# Patient Record
Sex: FEMALE | Race: BLACK OR AFRICAN AMERICAN | NOT HISPANIC OR LATINO | Employment: OTHER | URBAN - METROPOLITAN AREA
[De-identification: names, ages, dates, MRNs, and addresses within clinical notes are randomized per-mention and may not be internally consistent; named-entity substitution may affect disease eponyms.]

---

## 2017-02-23 ENCOUNTER — TRANSCRIBE ORDERS (OUTPATIENT)
Dept: ADMINISTRATIVE | Facility: HOSPITAL | Age: 42
End: 2017-02-23

## 2017-02-23 DIAGNOSIS — G24.9 DYSTONIA: Primary | ICD-10-CM

## 2017-03-28 ENCOUNTER — HOSPITAL ENCOUNTER (OUTPATIENT)
Dept: INFUSION THERAPY | Facility: HOSPITAL | Age: 42
Discharge: HOME | End: 2017-03-28
Attending: PHYSICAL MEDICINE & REHABILITATION | Admitting: PHYSICAL MEDICINE & REHABILITATION

## 2017-03-28 VITALS
HEART RATE: 114 BPM | BODY MASS INDEX: 32.62 KG/M2 | SYSTOLIC BLOOD PRESSURE: 128 MMHG | DIASTOLIC BLOOD PRESSURE: 84 MMHG | HEIGHT: 66 IN | RESPIRATION RATE: 20 BRPM | OXYGEN SATURATION: 98 % | TEMPERATURE: 98.8 F | WEIGHT: 203 LBS

## 2017-03-28 DIAGNOSIS — G24.9 DYSTONIA: ICD-10-CM

## 2017-03-28 DIAGNOSIS — R25.2 MUSCLE CRAMP: ICD-10-CM

## 2017-03-28 DIAGNOSIS — Z86.61: ICD-10-CM

## 2017-03-28 DIAGNOSIS — R25.2 SPASTICITY: Primary | ICD-10-CM

## 2017-03-28 PROCEDURE — 95874 GUIDE NERV DESTR NEEDLE EMG: CPT

## 2017-03-28 PROCEDURE — 25010000002 ONABOTULINUMTOXINA 100 UNITS RECONSTITUTED SOLUTION: Performed by: PHYSICAL MEDICINE & REHABILITATION

## 2017-03-28 RX ADMIN — ONABOTULINUMTOXINA 300 UNITS: 100 INJECTION, POWDER, LYOPHILIZED, FOR SOLUTION INTRADERMAL; INTRAMUSCULAR at 10:31

## 2017-03-28 RX ADMIN — ONABOTULINUMTOXINA 100 UNITS: 100 INJECTION, POWDER, LYOPHILIZED, FOR SOLUTION INTRADERMAL; INTRAMUSCULAR at 10:29

## 2017-03-28 NOTE — OP NOTE
DATE OF PROCEDURE:  03/28/2017    SURGEON: Nghia Alvarez MD     DIAGNOSES:  1.   Left upper extremity and left lower extremity spasticity/dystonia.   2.   History of meningitis at 18 months of age.   3.   History of left arm tendon transfer for finger strength, [facility] in 1988.   4.   History of plate, left forearm, 1995,     PROCEDURE PERFORMED: Botulinum toxin injection with electromyographic guidance, left upper extremity and left lower extremity.     HISTORY: The patient is a 41-year-old female with history of meningitis at 18 months of age with subtle weakness and spasticity. She has had increased left-sided tightness in the arm and leg with increased dystonia for the last 4-5 years. She is showing a good response to previous Botox injections for dystonia with flexion and extension of the elbow and fingers as well as left foot inversion and toe curling. Her last injection was on 12/22/2016. This was 5-apart from her previous 1 and she did not get as much benefit possibly due to the Botox wearing off further.  We discussed increasing her dose to 400 units and adding on the foot intrinsic muscles for her toe curling and adduction.     PHYSICAL EXAMINATION: She has significant spasticity spasticity/dystonia in the left upper extremity with elbow flexors greater than elbow extensors as well as finger flexors. When she ambulates left elbow is flexed.  She has a left double metal upright AFO with a lateral T-strap. She has inversion tone with toes curling and abduction of the first toe.     Risks, benefits, and alternatives were reviewed. Risks include bleeding, infection, excessive weakness, incomplete reduction of her dystonia/spasticity, systemic spread affecting breathing or swallowing muscles, local muscle soreness. Benefits are to decrease her spasticity/dystonia and improve her comfort in position and functional use of the left arm and left leg. Consent was signed and on the chart.      DESCRIPTION OF PROCEDURE: The skin was prepped in sterile fashion. Botulinum toxin type A-Botox at a concentration of 50 units/mL was injected with electromyographic guidance. Left biceps 37.5 units, brachialis 37.5 units, triceps 25 units, brachioradialis 12.5 units, flexor digitorum superficialis 12.5 units, posterior tibialis 125 units, flexure digitorum longus 62.5 unit, flexure hallucis longus 50 units, flexure digitorum brevis 25 units, and adductor hallucis 12.5 units (we elected not to inject the EHL-extensor hallucis longus as it is not problematic when she is walking, but is when she is at rest).   (We also discussed possibly readjusting doses more toward the foot intrinsic muscles on subsequent injections depending on her response today).  Lot #, [id number], expiration September 2019. The patient tolerated the procedure well. No bleeding. Aspiration negative at all sites.     ASSESSMENT AND PLAN: History of left upper extremity and left lower extremity spasticity/dystonia-status post Botox injection today.  Continue with her left double metal upright AFO. She will follow up in the office in approximately 3 weeks for reassessment.           Nghia Alvarez M.D.*  Gertrude  D:   03/28/2017 11:49:06  T:   03/28/2017 13:47:40  Job ID:   [id number]  Document ID:   [id number]  cc:

## 2017-03-28 NOTE — PROGRESS NOTES
The patient has been informed that these injections can have side effects which include rash, flu-like symptoms, dry-mouth, difficulty swallowing, muscle weakness or allergic reaction.  The patient has been instructed that if any of the above symptoms should occur, the patient should go to the nearest emergency room for attention.  Watch for head droop, breathing changes and swallow problems.  Follow up in one month to assess results of today's injection and plan to re-inject in 3 months depending on response.    Call Dr. Nghia Alvarez if you have any questions or concerns.  You can reach   Dr. Alvarez at [phone] [address] [facility].    Pt tolerated well.  Pt given discharge instructions concerning side effects and next appt. Pt discharged ambulatory at 1115.

## 2017-03-28 NOTE — PATIENT INSTRUCTIONS
The patient has been informed that these injections can have side effects which include rash, flu-like symptoms, dry-mouth, difficulty swallowing, muscle weakness or allergic reaction.  The patient has been instructed that if any of the above symptoms should occur, the patient should go to the nearest emergency room for attention.  Watch for head droop, breathing changes and swallow problems.  Follow up in one month to assess results of today's injection and plan to re-inject in 3 months depending on response.    Call Dr. Nghia Alvarez if you have any questions or concerns.  You can reach   Dr. Alvarez at [phone] [address] [facility].

## 2017-05-02 ENCOUNTER — TRANSCRIBE ORDERS (OUTPATIENT)
Dept: ADMINISTRATIVE | Facility: HOSPITAL | Age: 42
End: 2017-05-02

## 2017-05-02 DIAGNOSIS — G24.9 DYSTONIA: Primary | ICD-10-CM

## 2017-07-11 ENCOUNTER — HOSPITAL ENCOUNTER (OUTPATIENT)
Dept: INFUSION THERAPY | Facility: HOSPITAL | Age: 42
Discharge: HOME | End: 2017-07-11
Attending: PHYSICAL MEDICINE & REHABILITATION | Admitting: PHYSICAL MEDICINE & REHABILITATION

## 2017-07-11 VITALS
SYSTOLIC BLOOD PRESSURE: 138 MMHG | HEART RATE: 104 BPM | RESPIRATION RATE: 16 BRPM | OXYGEN SATURATION: 94 % | TEMPERATURE: 98.2 F | DIASTOLIC BLOOD PRESSURE: 82 MMHG

## 2017-07-11 DIAGNOSIS — G24.9 DYSTONIA: ICD-10-CM

## 2017-07-11 PROCEDURE — 95874 GUIDE NERV DESTR NEEDLE EMG: CPT

## 2017-07-11 PROCEDURE — 25010000002 ONABOTULINUMTOXINA 100 UNITS RECONSTITUTED SOLUTION: Performed by: PHYSICAL MEDICINE & REHABILITATION

## 2017-07-11 RX ADMIN — ONABOTULINUMTOXINA 400 UNITS: 100 INJECTION, POWDER, LYOPHILIZED, FOR SOLUTION INTRADERMAL; INTRAMUSCULAR at 09:15

## 2017-07-11 NOTE — OP NOTE
DATE OF PROCEDURE: 07/11/2017     SURGEON: Nghia Alvarez MD      DIAGNOSES:  1. Left upper extremity and left lower extremity spasticity/dystonia.   2. History of meningitis at 18 months of age.   3. History of left arm tendon transfer for finger strength, [facility] in 1988.   4. History of plate, left forearm, 1995,      PROCEDURE PERFORMED: Botulinum toxin injection with electromyographic guidance, left upper extremity and left lower extremity.      HISTORY: The patient is a 41-year-old female with history of meningitis at 18 months of age with subtle weakness and spasticity. She has had increased left-sided tightness in the arm and leg with increased dystonia for the last 4-5 years. She is showing a good response to previous Botox injections for dystonia with flexion and extension of the elbow and fingers as well as left foot inversion and toe curling. Her last injections were on 12/22/2016 and 03/28/2017. After her last injection she felt heaviness in the LLE and we discussed decreasing dose in the Left leg muscles with same doses with left foot intrinsics and LUE. .      PHYSICAL EXAMINATION: She has significant spasticity spasticity/dystonia in the left upper extremity with elbow flexors greater than elbow extensors as well as finger flexors. When she ambulates left elbow is flexed. She has a left double metal upright AFO with a lateral T-strap. She has inversion tone with toes curling and abduction of the first toe.      Risks, benefits, and alternatives were reviewed. Risks include bleeding, infection, excessive weakness, incomplete reduction of her dystonia/spasticity, systemic spread affecting breathing or swallowing muscles, local muscle soreness. Benefits are to decrease her spasticity/dystonia and improve her comfort in position and functional use of the left arm and left leg. Consent was signed and on the chart.      DESCRIPTION OF PROCEDURE: The skin was prepped in sterile fashion.  Botulinum toxin type A-Botox at a concentration of 50 units/mL was injected with electromyographic guidance. Left biceps 37.5 units, brachialis 37.5 units, triceps 25 units, brachioradialis 12.5 units, flexor digitorum superficialis 12.5 units, posterior tibialis 100 units, flexure digitorum longus 50 unit, flexure hallucis longus 40 units, flexure digitorum brevis 25 units, and adductor hallucis 12.5 units. Total 352.5 units injected, wastage of 47.5 units, out of 400 units drawn up for procedure. The patient tolerated the procedure well. No bleeding. Aspiration negative at all sites.      ASSESSMENT AND PLAN: History of left upper extremity and left lower extremity spasticity/dystonia-status post Botox injection today. Continue with her left double metal upright AFO. She will follow up in the office in approximately 4 weeks for reassessment.               Nghia Alvarez M.D

## 2017-07-11 NOTE — PATIENT INSTRUCTIONS
The patient has been informed that these injections can have side effects which include rash, flu-like symptoms, dry-mouth, difficulty swallowing, muscle weakness or allergic reaction.  The patient has been instructed that if any of the above symptoms should occur, the patient should go to the nearest emergency room for attention.  Watch for head droop, breathing changes and swallow problems.  Follow up in one month to assess results of today's injection and plan to re-inject in 3 months depending on response.    Call Dr. Nghia Alvarez if you have any questions or concerns.  You can reach   Dr. Alvarez at [phone] [address] [facility].

## 2017-08-14 ENCOUNTER — TRANSCRIBE ORDERS (OUTPATIENT)
Dept: ADMINISTRATIVE | Facility: HOSPITAL | Age: 42
End: 2017-08-14

## 2017-08-14 DIAGNOSIS — G24.9 DYSTONIA: Primary | ICD-10-CM

## 2017-10-26 ENCOUNTER — HOSPITAL ENCOUNTER (OUTPATIENT)
Dept: INFUSION THERAPY | Facility: HOSPITAL | Age: 42
Discharge: HOME | End: 2017-10-26
Attending: PHYSICAL MEDICINE & REHABILITATION | Admitting: PHYSICAL MEDICINE & REHABILITATION

## 2017-10-26 VITALS
SYSTOLIC BLOOD PRESSURE: 135 MMHG | DIASTOLIC BLOOD PRESSURE: 89 MMHG | TEMPERATURE: 97.5 F | RESPIRATION RATE: 20 BRPM | OXYGEN SATURATION: 98 % | HEART RATE: 112 BPM

## 2017-10-26 DIAGNOSIS — G24.9 DYSTONIA: ICD-10-CM

## 2017-10-26 PROCEDURE — 25010000002 ONABOTULINUMTOXINA 100 UNITS RECONSTITUTED SOLUTION: Performed by: PHYSICAL MEDICINE & REHABILITATION

## 2017-10-26 PROCEDURE — 95874 GUIDE NERV DESTR NEEDLE EMG: CPT

## 2017-10-26 RX ADMIN — ONABOTULINUMTOXINA 400 UNITS: 100 INJECTION, POWDER, LYOPHILIZED, FOR SOLUTION INTRADERMAL; INTRAMUSCULAR at 10:05

## 2017-10-26 NOTE — OP NOTE
DATE OF PROCEDURE: 10/26/2017      SURGEON: Nghia Alvarez MD       DIAGNOSES:  1. Left upper extremity and left lower extremity spasticity/dystonia.   2. History of meningitis at 18 months of age.   3. History of left arm tendon transfer for finger strength, [facility] in 1988.   4. History of plate, left forearm, 1995,       PROCEDURE PERFORMED: Botulinum toxin injection with electromyographic guidance, left upper extremity and left lower extremity.       HISTORY: The patient is a 42-year-old female with history of meningitis at 18 months of age with subtle weakness and spasticity. She has had increased left-sided tightness in the arm and leg with increased dystonia for the last 4-5 years. She is showing a good response to previous Botox injections for dystonia with flexion and extension of the elbow and fingers as well as left foot inversion and toe curling. Her last injections were on 12/22/2016 and 03/28/2017 and 07/11/2017. After her last injection she had a good balance between spasticity reduction and avoiding excessive weakness. Botox has worn off. She wishes to repeat the injection in a similar fashion.      PHYSICAL EXAMINATION: She has significant spasticity spasticity/dystonia in the left upper extremity with elbow flexors greater than elbow extensors as well as finger flexors.   She has a left double metal upright AFO with a lateral T-strap. She has inversion tone with toes curling and abduction of the first toe.       Risks, benefits, and alternatives were reviewed. Risks include bleeding, infection, excessive weakness, incomplete reduction of her dystonia/spasticity, systemic spread affecting breathing or swallowing muscles, local muscle soreness. Benefits are to decrease her spasticity/dystonia and improve her comfort in position and functional use of the left arm and left leg. Consent was signed and on the chart.       DESCRIPTION OF PROCEDURE: The skin was prepped in sterile  fashion. Botulinum toxin type A-Botox at a concentration of 50 units/mL was injected with electromyographic guidance. Left biceps 37.5 units, brachialis 37.5 units, triceps 25 units, brachioradialis 12.5 units (more electromyographically active then biceps/brachialis), flexor digitorum superficialis 12.5 units, posterior tibialis 100 units, flexure digitorum longus 50 unit, flexure hallucis longus 40 units, flexure digitorum brevis 25 units, and adductor hallucis 12.5 units. Total 352.5 units injected, wastage of 47.5 units, out of 400 units drawn up for procedure. The patient tolerated the procedure well. No bleeding. Aspiration negative at all sites. Lot [id number] 04 2020 x 1 and [id number] 05 2020 x 3.       ASSESSMENT AND PLAN: History of left upper extremity and left lower extremity spasticity/dystonia-status post Botox injection today. Continue with her left double metal upright AFO. Continue Baclofen 20 mg tid.  She will follow up in the office in approximately Nov 15 for reassessment. May possibly re-distribute from the biceps/brachialis to the brachioradialis on subsequent injection depending on the response.                   Nghia Alvarez M.D

## 2017-10-26 NOTE — PATIENT INSTRUCTIONS
OnabotulinumtoxinA injection (Medical Use)  What is this medicine?  ONABOTULINUMTOXINA (o na PHILIP you lyletitia num tox in ) is a neuro-muscular blocker. This medicine is used to treat crossed eyes, eyelid spasms, severe neck muscle spasms, ankle and toe muscle spasms, and elbow, wrist, and finger muscle spasms. It is also used to treat excessive underarm sweating, to prevent chronic migraine headaches, and to treat loss of bladder control due to neurologic conditions such as multiple sclerosis or spinal cord injury.  This medicine may be used for other purposes; ask your health care provider or pharmacist if you have questions.  COMMON BRAND NAME(S): Botox  What should I tell my health care provider before I take this medicine?  They need to know if you have any of these conditions:  -breathing problems  -cerebral palsy spasms  -difficulty urinating  -heart problems  -history of surgery where this medicine is going to be used  -infection at the site where this medicine is going to be used  -myasthenia gravis or other neurologic disease  -nerve or muscle disease  -surgery plans  -take medicines that treat or prevent blood clots  -thyroid problems  -an unusual or allergic reaction to botulinum toxin, albumin, other medicines, foods, dyes, or preservatives  -pregnant or trying to get pregnant  -breast-feeding  How should I use this medicine?  This medicine is for injection into a muscle. It is given by a health care professional in a hospital or clinic setting.  Talk to your pediatrician regarding the use of this medicine in children. While this drug may be prescribed for children as young as 12 years old for selected conditions, precautions do apply.  Overdosage: If you think you have taken too much of this medicine contact a poison control center or emergency room at once.  NOTE: This medicine is only for you. Do not share this medicine with others.  What if I miss a dose?  This does not apply.  What may interact with  this medicine?  -aminoglycoside antibiotics like gentamicin, neomycin, tobramycin  -muscle relaxants  -other botulinum toxin injections  This list may not describe all possible interactions. Give your health care provider a list of all the medicines, herbs, non-prescription drugs, or dietary supplements you use. Also tell them if you smoke, drink alcohol, or use illegal drugs. Some items may interact with your medicine.  What should I watch for while using this medicine?  Visit your doctor for regular check ups.  This medicine will cause weakness in the muscle where it is injected. Tell your doctor if you feel unusually weak in other muscles. Get medical help right away if you have problems with breathing, swallowing, or talking.  This medicine might make your eyelids droop or make you see blurry or double. If you have weak muscles or trouble seeing do not drive a car, use machinery, or do other dangerous activities.  This medicine contains albumin from human blood. It may be possible to pass an infection in this medicine, but no cases have been reported. Talk to your doctor about the risks and benefits of this medicine.  If your activities have been limited by your condition, go back to your regular routine slowly after treatment with this medicine.  What side effects may I notice from receiving this medicine?  Side effects that you should report to your doctor or health care professional as soon as possible:  -allergic reactions like skin rash, itching or hives, swelling of the face, lips, or tongue  -breathing problems  -changes in vision  -chest pain or tightness  -eye irritation, pain  -fast, irregular heartbeat  -infection  -numbness  -speech problems  -swallowing problems  -unusual weakness  Side effects that usually do not require medical attention (report to your doctor or health care professional if they continue or are bothersome):  -bruising or pain at site where injected  -drooping eyelid  -dry eyes or  mouth  -headache  -muscles aches, pains  -sensitivity to light  -tearing  This list may not describe all possible side effects. Call your doctor for medical advice about side effects. You may report side effects to FDA at 1-042-MOL-2310.  Where should I keep my medicine?  This drug is given in a hospital or clinic and will not be stored at home.  NOTE: This sheet is a summary. It may not cover all possible information. If you have questions about this medicine, talk to your doctor, pharmacist, or health care provider.    FOLLOW-UP  The patient has been informed that these injections can have side effects which include rash, flu-like symptoms, dry-mouth, difficulty swallowing, muscle weakness or allergic reaction.  The patient has been instructed that if any of the above symptoms should occur, the patient should go to the nearest emergency room for attention.  Watch for head droop, breathing changes and swallow problems.  Follow up in one month to assess results of today's injection and plan to re-inject in 3 months depending on response.    Call Dr. Nghia Alvarez if you have any questions or concerns.  You can reach Dr. Alvarez at [phone] [facility].  Call to make appointment for follow-up in 3-4 weeks.     © 2017, [employer]. (2016-01-26 15:43:53)

## 2017-11-16 ENCOUNTER — TRANSCRIBE ORDERS (OUTPATIENT)
Dept: ADMINISTRATIVE | Facility: HOSPITAL | Age: 42
End: 2017-11-16

## 2017-11-16 DIAGNOSIS — G24.9 DYSTONIA: Primary | ICD-10-CM

## 2018-01-15 ENCOUNTER — OFFICE VISIT CONVERTED (OUTPATIENT)
Dept: ORTHOPEDIC SURGERY | Facility: CLINIC | Age: 43
End: 2018-01-15
Attending: ORTHOPAEDIC SURGERY

## 2018-01-30 ENCOUNTER — HOSPITAL ENCOUNTER (OUTPATIENT)
Dept: INFUSION THERAPY | Facility: HOSPITAL | Age: 43
Discharge: HOME | End: 2018-01-30
Attending: PHYSICAL MEDICINE & REHABILITATION

## 2018-01-30 DIAGNOSIS — G24.9 DYSTONIA: ICD-10-CM

## 2018-02-19 ENCOUNTER — HOSPITAL ENCOUNTER (OUTPATIENT)
Dept: INFUSION THERAPY | Facility: HOSPITAL | Age: 43
Discharge: HOME | End: 2018-02-19
Attending: PHYSICAL MEDICINE & REHABILITATION | Admitting: PHYSICAL MEDICINE & REHABILITATION

## 2018-02-19 VITALS
DIASTOLIC BLOOD PRESSURE: 68 MMHG | HEART RATE: 95 BPM | SYSTOLIC BLOOD PRESSURE: 127 MMHG | RESPIRATION RATE: 16 BRPM | TEMPERATURE: 98.1 F | OXYGEN SATURATION: 100 %

## 2018-02-19 DIAGNOSIS — G24.9 DYSTONIA: ICD-10-CM

## 2018-02-19 PROCEDURE — 95874 GUIDE NERV DESTR NEEDLE EMG: CPT

## 2018-02-19 PROCEDURE — 25010000002 ONABOTULINUMTOXINA 100 UNITS RECONSTITUTED SOLUTION: Performed by: PHYSICAL MEDICINE & REHABILITATION

## 2018-02-19 RX ADMIN — ONABOTULINUMTOXINA 300 UNITS: 100 INJECTION, POWDER, LYOPHILIZED, FOR SOLUTION INTRADERMAL; INTRAMUSCULAR at 08:40

## 2018-02-19 NOTE — PATIENT INSTRUCTIONS
OnabotulinumtoxinA injection (Medical Use)  What is this medicine?  ONABOTULINUMTOXINA (o na PHILIP you lyletitia num tox in ) is a neuro-muscular blocker. This medicine is used to treat crossed eyes, eyelid spasms, severe neck muscle spasms, ankle and toe muscle spasms, and elbow, wrist, and finger muscle spasms. It is also used to treat excessive underarm sweating, to prevent chronic migraine headaches, and to treat loss of bladder control due to neurologic conditions such as multiple sclerosis or spinal cord injury.  This medicine may be used for other purposes; ask your health care provider or pharmacist if you have questions.  COMMON BRAND NAME(S): Botox  What should I tell my health care provider before I take this medicine?  They need to know if you have any of these conditions:  -breathing problems  -cerebral palsy spasms  -difficulty urinating  -heart problems  -history of surgery where this medicine is going to be used  -infection at the site where this medicine is going to be used  -myasthenia gravis or other neurologic disease  -nerve or muscle disease  -surgery plans  -take medicines that treat or prevent blood clots  -thyroid problems  -an unusual or allergic reaction to botulinum toxin, albumin, other medicines, foods, dyes, or preservatives  -pregnant or trying to get pregnant  -breast-feeding  How should I use this medicine?  This medicine is for injection into a muscle. It is given by a health care professional in a hospital or clinic setting.  Talk to your pediatrician regarding the use of this medicine in children. While this drug may be prescribed for children as young as 12 years old for selected conditions, precautions do apply.  Overdosage: If you think you have taken too much of this medicine contact a poison control center or emergency room at once.  NOTE: This medicine is only for you. Do not share this medicine with others.  What if I miss a dose?  This does not apply.  What may interact with  this medicine?  -aminoglycoside antibiotics like gentamicin, neomycin, tobramycin  -muscle relaxants  -other botulinum toxin injections  This list may not describe all possible interactions. Give your health care provider a list of all the medicines, herbs, non-prescription drugs, or dietary supplements you use. Also tell them if you smoke, drink alcohol, or use illegal drugs. Some items may interact with your medicine.  What should I watch for while using this medicine?  Visit your doctor for regular check ups.  This medicine will cause weakness in the muscle where it is injected. Tell your doctor if you feel unusually weak in other muscles. Get medical help right away if you have problems with breathing, swallowing, or talking.  This medicine might make your eyelids droop or make you see blurry or double. If you have weak muscles or trouble seeing do not drive a car, use machinery, or do other dangerous activities.  This medicine contains albumin from human blood. It may be possible to pass an infection in this medicine, but no cases have been reported. Talk to your doctor about the risks and benefits of this medicine.  If your activities have been limited by your condition, go back to your regular routine slowly after treatment with this medicine.  What side effects may I notice from receiving this medicine?  Side effects that you should report to your doctor or health care professional as soon as possible:  -allergic reactions like skin rash, itching or hives, swelling of the face, lips, or tongue  -breathing problems  -changes in vision  -chest pain or tightness  -eye irritation, pain  -fast, irregular heartbeat  -infection  -numbness  -speech problems  -swallowing problems  -unusual weakness  Side effects that usually do not require medical attention (report to your doctor or health care professional if they continue or are bothersome):  -bruising or pain at site where injected  -drooping eyelid  -dry eyes or  mouth  -headache  -muscles aches, pains  -sensitivity to light  -tearing  This list may not describe all possible side effects. Call your doctor for medical advice about side effects. You may report side effects to FDA at 1-654-MIN-1903.  Where should I keep my medicine?  This drug is given in a hospital or clinic and will not be stored at home.  NOTE: This sheet is a summary. It may not cover all possible information. If you have questions about this medicine, talk to your doctor, pharmacist, or health care provider.    FOLLOW-UP  The patient has been informed that these injections can have side effects which include rash, flu-like symptoms, dry-mouth, difficulty swallowing, muscle weakness or allergic reaction.  The patient has been instructed that if any of the above symptoms should occur, the patient should go to the nearest emergency room for attention.  Watch for head droop, breathing changes and swallow problems.  Follow up in one month to assess results of today's injection and plan to re-inject in 3 months depending on response.    Call Dr. Nghia Alvarez if you have any questions or concerns.  You can reach Dr. Alvarez at [phone] [facility].  Follow-up appointment has been made with Dr Alvarez for Wednesday, March 14, 2018 @ 10 AM.  © 2018 [employer] (2016-01-26 15:43:53)

## 2018-02-19 NOTE — PROGRESS NOTES
Pt tolerated without complaints.  Follow-up appointment made for Wednesday, March 14 @ 10 AM.  Pt advised of such.  Pt D/C per ambulation @ 0908.

## 2018-02-19 NOTE — OP NOTE
DATE OF PROCEDURE: 02/19/2018      SURGEON: Nghia Alvarez MD       DIAGNOSES:  1. Left upper extremity and left lower extremity spasticity/dystonia.   2. History of meningitis at 18 months of age.   3. History of left arm tendon transfer for finger strength, [facility] in 1988.   4. History of plate, left forearm, 1995,       PROCEDURE PERFORMED: Botulinum toxin injection with electromyographic guidance, left upper extremity and left lower extremity.       HISTORY: The patient is a 42-year-old female with history of meningitis at 18 months of age with subtle weakness and spasticity. She has had increased left-sided tightness in the arm and leg with increased dystonia for the last 4-5 years. She is showing a good response to previous Botox injections for dystonia with flexion and extension of the elbow and fingers as well as left foot inversion and toe curling. Her last injections were on 12/22/2016 and 03/28/2017 and 07/11/2017 and 10/26/17. After her last injection she had a good balance between spasticity reduction and avoiding excessive weakness. Botox has worn off. She wishes to repeat the injection .  She notes increased left first toe curling up so we discussed not inject  FDB/adductor hallucis so as not decrease opposing tone of EHL and not inject FHL so as not decrease opposing tone of EHL or foot invertors by spread to peroneus with lateral approach- may adjust on subsequent injections. We discussed increasing dose to posterior tibialis as increased foot inversion recently       PHYSICAL EXAMINATION: She has significant spasticity spasticity/dystonia in the left upper extremity with elbow flexors greater than elbow extensors as well as finger flexors.   She has a left double metal upright AFO with a lateral T-strap. She has inversion tone with toes curling and extension of the first toe.       Risks, benefits, and alternatives were reviewed. Risks include bleeding, infection, excessive  weakness, incomplete reduction of her dystonia/spasticity, systemic spread affecting breathing or swallowing muscles, local muscle soreness. Benefits are to decrease her spasticity/dystonia and improve her comfort in position and functional use of the left arm and left leg. Consent was signed and on the chart.       DESCRIPTION OF PROCEDURE: The skin was prepped in sterile fashion. Botulinum toxin type A-Botox at a concentration of 50 units/mL was injected with electromyographic guidance. Left biceps 25 units, brachialis 37.5 units, triceps 25 units, brachioradialis 25 units , flexor digitorum superficialis 12.5 units, posterior tibialis 125 units, flexure digitorum longus 50 unit, Total 300 units injected out of 300 units drawn up for procedure. The patient tolerated the procedure well. No bleeding. Aspiration negative at all sites. Lot [id number] 09 2020 x 2 and [id number] 08 2020 x 1.       ASSESSMENT AND PLAN: History of left upper extremity and left lower extremity spasticity/dystonia-status post Botox injection today. Continue with her left double metal upright AFO. Continue Baclofen 20 mg tid.  She will follow up in the office on Wed March 14 at 10:00 for reassessment.               Nghia Alvarez M.D

## 2018-03-12 ENCOUNTER — OFFICE VISIT CONVERTED (OUTPATIENT)
Dept: INTERNAL MEDICINE | Facility: CLINIC | Age: 43
End: 2018-03-12
Attending: INTERNAL MEDICINE

## 2018-03-21 ENCOUNTER — TRANSCRIBE ORDERS (OUTPATIENT)
Dept: ADMINISTRATIVE | Facility: HOSPITAL | Age: 43
End: 2018-03-21

## 2018-03-21 DIAGNOSIS — G24.9 DYSTONIA: Primary | ICD-10-CM

## 2018-06-05 ENCOUNTER — HOSPITAL ENCOUNTER (OUTPATIENT)
Dept: INFUSION THERAPY | Facility: HOSPITAL | Age: 43
Discharge: HOME | End: 2018-06-05
Attending: PHYSICAL MEDICINE & REHABILITATION | Admitting: PHYSICAL MEDICINE & REHABILITATION

## 2018-06-05 VITALS
HEART RATE: 102 BPM | RESPIRATION RATE: 20 BRPM | TEMPERATURE: 95.9 F | DIASTOLIC BLOOD PRESSURE: 63 MMHG | OXYGEN SATURATION: 96 % | SYSTOLIC BLOOD PRESSURE: 116 MMHG

## 2018-06-05 DIAGNOSIS — G24.9 DYSTONIA: ICD-10-CM

## 2018-06-05 PROCEDURE — 95874 GUIDE NERV DESTR NEEDLE EMG: CPT

## 2018-06-05 PROCEDURE — 25010000002 ONABOTULINUMTOXINA 100 UNITS RECONSTITUTED SOLUTION: Performed by: PHYSICAL MEDICINE & REHABILITATION

## 2018-06-05 RX ADMIN — ONABOTULINUMTOXINA 400 UNITS: 100 INJECTION, POWDER, LYOPHILIZED, FOR SOLUTION INTRADERMAL; INTRAMUSCULAR at 12:53

## 2018-06-05 NOTE — OP NOTE
DATE OF PROCEDURE: 06/05/2018      SURGEON: Nghia Alvarez MD - attending/ Leighton Hernandez MD- resident physician      DIAGNOSES:  1. Left upper extremity and left lower extremity spasticity/dystonia.   2. History of meningitis at 18 months of age.   3. History of left arm tendon transfer for finger strength, [facility] in 1988.   4. History of plate, left forearm, 1995,       PROCEDURE PERFORMED: Botulinum toxin injection with electromyographic guidance, left upper extremity and left lower extremity.       HISTORY: The patient is a 42-year-old female with history of meningitis at 18 months of age with weakness and spasticity. She has had increased left-sided tightness in the arm and leg with increased dystonia for the last 4-5 years. She is showing a good response to previous Botox injections for dystonia with flexion and extension of the elbow and fingers as well as left foot inversion and toe curling and striatal toe as well. Her last injections were on 12/22/2016 and 03/28/2017 and 07/11/2017 and 10/26/17 and 02/19/2018. After her last injection she had a good balance between spasticity reduction and avoiding excessive weakness. Botox has worn off. She wishes to repeat the injection .          PHYSICAL EXAMINATION: She has significant spasticity spasticity/dystonia in the left upper extremity with elbow flexors greater than elbow extensors as well as finger flexors.   She has a left double metal upright AFO with a lateral T-strap. She has inversion tone with toes curling and extension of the first toe.       Risks, benefits, and alternatives were reviewed. Risks include bleeding, infection, excessive weakness, incomplete reduction of her dystonia/spasticity, systemic spread affecting breathing or swallowing muscles, local muscle soreness. Benefits are to decrease her spasticity/dystonia and improve her comfort in position and functional use of the left arm and left leg. Consent was signed and  on the chart.       DESCRIPTION OF PROCEDURE: The skin was prepped in sterile fashion. Botulinum toxin type A-Botox at a concentration of 50 units/mL was injected with electromyographic guidance. Left biceps 25 units, brachialis 37.5 units, triceps 25 units, brachioradialis 25 units , flexor digitorum superficialis 12.5 units, posterior tibialis 125 units, flexure digitorum longus 50 units, flexor hallucis longus 40 units, flexor digitorum brevis 25 units, adductor hallucis 12.5, extensor hallucis longus  20 , Total 397.5 units injected, wastage 2.5 units,  out of 400 units drawn up for procedure. The patient tolerated the procedure well. No bleeding. Aspiration negative at all sites. Lot [id number] 11 2020 x 4.       ASSESSMENT AND PLAN: History of left upper extremity and left lower extremity spasticity/dystonia-status post Botox injection today. Continue with her left double metal upright AFO. Continue Baclofen 20 mg tid.  She will follow up in the office on Wed June 27 at 2:20 pm for reassessment.               Nghia Alvarez M.D

## 2018-06-05 NOTE — PROGRESS NOTES
Tolerated procedure very well. Provided the patient a copy of the AVS with medication information. Discharged home ambulatory at 1412

## 2018-06-05 NOTE — PATIENT INSTRUCTIONS
OnabotulinumtoxinA injection (Medical Use)  What is this medicine?  ONABOTULINUMTOXINA (o na PHILIP you lyletitia num tox in ) is a neuro-muscular blocker. This medicine is used to treat crossed eyes, eyelid spasms, severe neck muscle spasms, ankle and toe muscle spasms, and elbow, wrist, and finger muscle spasms. It is also used to treat excessive underarm sweating, to prevent chronic migraine headaches, and to treat loss of bladder control due to neurologic conditions such as multiple sclerosis or spinal cord injury.  This medicine may be used for other purposes; ask your health care provider or pharmacist if you have questions.  COMMON BRAND NAME(S): Botox  What should I tell my health care provider before I take this medicine?  They need to know if you have any of these conditions:  -breathing problems  -cerebral palsy spasms  -difficulty urinating  -heart problems  -history of surgery where this medicine is going to be used  -infection at the site where this medicine is going to be used  -myasthenia gravis or other neurologic disease  -nerve or muscle disease  -surgery plans  -take medicines that treat or prevent blood clots  -thyroid problems  -an unusual or allergic reaction to botulinum toxin, albumin, other medicines, foods, dyes, or preservatives  -pregnant or trying to get pregnant  -breast-feeding  How should I use this medicine?  This medicine is for injection into a muscle. It is given by a health care professional in a hospital or clinic setting.  Talk to your pediatrician regarding the use of this medicine in children. While this drug may be prescribed for children as young as 12 years old for selected conditions, precautions do apply.  Overdosage: If you think you have taken too much of this medicine contact a poison control center or emergency room at once.  NOTE: This medicine is only for you. Do not share this medicine with others.  What if I miss a dose?  This does not apply.  What may interact with  this medicine?  -aminoglycoside antibiotics like gentamicin, neomycin, tobramycin  -muscle relaxants  -other botulinum toxin injections  This list may not describe all possible interactions. Give your health care provider a list of all the medicines, herbs, non-prescription drugs, or dietary supplements you use. Also tell them if you smoke, drink alcohol, or use illegal drugs. Some items may interact with your medicine.  What should I watch for while using this medicine?  Visit your doctor for regular check ups.  This medicine will cause weakness in the muscle where it is injected. Tell your doctor if you feel unusually weak in other muscles. Get medical help right away if you have problems with breathing, swallowing, or talking.  This medicine might make your eyelids droop or make you see blurry or double. If you have weak muscles or trouble seeing do not drive a car, use machinery, or do other dangerous activities.  This medicine contains albumin from human blood. It may be possible to pass an infection in this medicine, but no cases have been reported. Talk to your doctor about the risks and benefits of this medicine.  If your activities have been limited by your condition, go back to your regular routine slowly after treatment with this medicine.  What side effects may I notice from receiving this medicine?  Side effects that you should report to your doctor or health care professional as soon as possible:  -allergic reactions like skin rash, itching or hives, swelling of the face, lips, or tongue  -breathing problems  -changes in vision  -chest pain or tightness  -eye irritation, pain  -fast, irregular heartbeat  -infection  -numbness  -speech problems  -swallowing problems  -unusual weakness  Side effects that usually do not require medical attention (report to your doctor or health care professional if they continue or are bothersome):  -bruising or pain at site where injected  -drooping eyelid  -dry eyes or  mouth  -headache  -muscles aches, pains  -sensitivity to light  -tearing  This list may not describe all possible side effects. Call your doctor for medical advice about side effects. You may report side effects to FDA at 2-434-WFG-8688.  Where should I keep my medicine?  This drug is given in a hospital or clinic and will not be stored at home.  NOTE: This sheet is a summary. It may not cover all possible information. If you have questions about this medicine, talk to your doctor, pharmacist, or health care provider.  © 2018 [employer] (2016-01-26 15:43:53)

## 2018-07-09 ENCOUNTER — CONVERSION ENCOUNTER (OUTPATIENT)
Dept: INTERNAL MEDICINE | Facility: CLINIC | Age: 43
End: 2018-07-09

## 2018-07-09 ENCOUNTER — OFFICE VISIT CONVERTED (OUTPATIENT)
Dept: INTERNAL MEDICINE | Facility: CLINIC | Age: 43
End: 2018-07-09
Attending: INTERNAL MEDICINE

## 2018-07-17 ENCOUNTER — TRANSCRIBE ORDERS (OUTPATIENT)
Dept: ADMINISTRATIVE | Facility: HOSPITAL | Age: 43
End: 2018-07-17

## 2018-07-17 DIAGNOSIS — G24.9 DYSTONIA: Primary | ICD-10-CM

## 2018-09-18 ENCOUNTER — HOSPITAL ENCOUNTER (OUTPATIENT)
Dept: INFUSION THERAPY | Facility: HOSPITAL | Age: 43
Discharge: HOME | End: 2018-09-18
Attending: PHYSICAL MEDICINE & REHABILITATION | Admitting: PHYSICAL MEDICINE & REHABILITATION

## 2018-09-18 VITALS
RESPIRATION RATE: 20 BRPM | HEART RATE: 111 BPM | SYSTOLIC BLOOD PRESSURE: 115 MMHG | OXYGEN SATURATION: 98 % | DIASTOLIC BLOOD PRESSURE: 72 MMHG | TEMPERATURE: 98.6 F

## 2018-09-18 DIAGNOSIS — G24.9 DYSTONIA: ICD-10-CM

## 2018-09-18 PROCEDURE — 25010000002 ONABOTULINUMTOXINA 100 UNITS RECONSTITUTED SOLUTION: Performed by: PHYSICAL MEDICINE & REHABILITATION

## 2018-09-18 PROCEDURE — 95874 GUIDE NERV DESTR NEEDLE EMG: CPT

## 2018-09-18 RX ADMIN — ONABOTULINUMTOXINA 400 UNITS: 100 INJECTION, POWDER, LYOPHILIZED, FOR SOLUTION INTRADERMAL; INTRAMUSCULAR at 15:20

## 2018-09-18 NOTE — PATIENT INSTRUCTIONS
OnabotulinumtoxinA injection (Medical Use)  What is this medicine?  ONABOTULINUMTOXINA (o na PHILIP you lyletitia num tox in ) is a neuro-muscular blocker. This medicine is used to treat crossed eyes, eyelid spasms, severe neck muscle spasms, ankle and toe muscle spasms, and elbow, wrist, and finger muscle spasms. It is also used to treat excessive underarm sweating, to prevent chronic migraine headaches, and to treat loss of bladder control due to neurologic conditions such as multiple sclerosis or spinal cord injury.  This medicine may be used for other purposes; ask your health care provider or pharmacist if you have questions.  COMMON BRAND NAME(S): Botox  What should I tell my health care provider before I take this medicine?  They need to know if you have any of these conditions:  -breathing problems  -cerebral palsy spasms  -difficulty urinating  -heart problems  -history of surgery where this medicine is going to be used  -infection at the site where this medicine is going to be used  -myasthenia gravis or other neurologic disease  -nerve or muscle disease  -surgery plans  -take medicines that treat or prevent blood clots  -thyroid problems  -an unusual or allergic reaction to botulinum toxin, albumin, other medicines, foods, dyes, or preservatives  -pregnant or trying to get pregnant  -breast-feeding  How should I use this medicine?  This medicine is for injection into a muscle. It is given by a health care professional in a hospital or clinic setting.  Talk to your pediatrician regarding the use of this medicine in children. While this drug may be prescribed for children as young as 12 years old for selected conditions, precautions do apply.  Overdosage: If you think you have taken too much of this medicine contact a poison control center or emergency room at once.  NOTE: This medicine is only for you. Do not share this medicine with others.  What if I miss a dose?  This does not apply.  What may interact with  this medicine?  -aminoglycoside antibiotics like gentamicin, neomycin, tobramycin  -muscle relaxants  -other botulinum toxin injections  This list may not describe all possible interactions. Give your health care provider a list of all the medicines, herbs, non-prescription drugs, or dietary supplements you use. Also tell them if you smoke, drink alcohol, or use illegal drugs. Some items may interact with your medicine.  What should I watch for while using this medicine?  Visit your doctor for regular check ups.  This medicine will cause weakness in the muscle where it is injected. Tell your doctor if you feel unusually weak in other muscles. Get medical help right away if you have problems with breathing, swallowing, or talking.  This medicine might make your eyelids droop or make you see blurry or double. If you have weak muscles or trouble seeing do not drive a car, use machinery, or do other dangerous activities.  This medicine contains albumin from human blood. It may be possible to pass an infection in this medicine, but no cases have been reported. Talk to your doctor about the risks and benefits of this medicine.  If your activities have been limited by your condition, go back to your regular routine slowly after treatment with this medicine.  What side effects may I notice from receiving this medicine?  Side effects that you should report to your doctor or health care professional as soon as possible:  -allergic reactions like skin rash, itching or hives, swelling of the face, lips, or tongue  -breathing problems  -changes in vision  -chest pain or tightness  -eye irritation, pain  -fast, irregular heartbeat  -infection  -numbness  -speech problems  -swallowing problems  -unusual weakness  Side effects that usually do not require medical attention (report to your doctor or health care professional if they continue or are bothersome):  -bruising or pain at site where injected  -drooping eyelid  -dry eyes or  mouth  -headache  -muscles aches, pains  -sensitivity to light  -tearing  This list may not describe all possible side effects. Call your doctor for medical advice about side effects. You may report side effects to FDA at 6-904-QWO-9438.  Where should I keep my medicine?  This drug is given in a hospital or clinic and will not be stored at home.  NOTE: This sheet is a summary. It may not cover all possible information. If you have questions about this medicine, talk to your doctor, pharmacist, or health care provider.    FOLLOW-UP  The patient has been informed that these injections can have side effects which include rash, flu-like symptoms, dry-mouth, difficulty swallowing, muscle weakness or allergic reaction.  The patient has been instructed that if any of the above symptoms should occur, the patient should go to the nearest emergency room for attention.  Watch for head droop, breathing changes and swallow problems.  Follow up in one month to assess results of today's injection and plan to re-inject in 3 months depending on response.  Call Dr. Nghia Alvarez if you have any questions or concerns.  You can reach Dr. Alvarez at [phone] [facility] [place].  Call the office for a follow-up in 3-4 weeks.    © 2018 [employer] (2016-01-26 15:43:53)

## 2018-09-18 NOTE — PROGRESS NOTES
Pt tolerated injections well.  AVS given with follow-up instructions.  Pt DC per ambulation with spouse @ 6123.

## 2018-09-18 NOTE — OP NOTE
DATE OF PROCEDURE: 09/18/2018      SURGEON: Nghia Alvarez MD - attending      DIAGNOSES:  1. Left upper extremity and left lower extremity spasticity/dystonia.   2. History of meningitis at 18 months of age.   3. History of left arm tendon transfer for finger strength, [facility] in 1988.   4. History of plate, left forearm, 1995,       PROCEDURE PERFORMED: Botulinum toxin injection with electromyographic guidance, left upper extremity and left lower extremity.       HISTORY: The patient is a 42-year-old female with history of meningitis at 18 months of age with weakness and spasticity. She has had increased left-sided tightness in the arm and leg with increased dystonia for the last 4-5 years. She is showing a good response to previous Botox injections for dystonia with flexion and extension of the elbow and fingers as well as left foot inversion and toe curling and striatal toe as well. Her last injections were on 12/22/2016 and 03/28/2017 and 07/11/2017 and 10/26/17 and 02/19/2018 and 06/05/2018. After her last injection she again had a good balance between spasticity reduction and avoiding excessive weakness. Botox has worn off. She wishes to repeat the injection .           PHYSICAL EXAMINATION: She has significant spasticity spasticity/dystonia in the left upper extremity with elbow flexors greater than elbow extensors as well as finger flexors.   Her left double metal upright AFO with a lateral T-strap is being repaired. . She has inversion tone with toes curling and extension of the first toe.       Risks, benefits, and alternatives were reviewed. Risks include bleeding, infection, excessive weakness, incomplete reduction of her dystonia/spasticity, systemic spread affecting breathing or swallowing muscles, local muscle soreness. Benefits are to decrease her spasticity/dystonia and improve her comfort in position and functional use of the left arm and left leg. Consent was signed and on the  chart.       DESCRIPTION OF PROCEDURE: The skin was prepped in sterile fashion. Botulinum toxin type A-Botox at a concentration of 50 units/mL was injected with electromyographic guidance. Left biceps 25 units, brachialis 37.5 units, triceps 25 units, brachioradialis 25 units , flexor digitorum superficialis 12.5 units, posterior tibialis 125 units, flexure digitorum longus 50 units, flexor hallucis longus 40 units, flexor digitorum brevis 25 units, adductor hallucis 12.5, extensor hallucis longus  20 , Total 397.5 units injected, wastage 2.5 units,  out of 400 units drawn up for procedure. The patient tolerated the procedure well. No bleeding. Aspiration negative at all sites. Lot [id number] 01 2021 x four.       ASSESSMENT AND PLAN: History of left upper extremity and left lower extremity spasticity/dystonia-status post Botox injection today. Continue with her left double metal upright AFO. Monitor skin on plantar foot. Continue Baclofen 20 mg tid.  She will follow up in the office in 3-4 weeks for reassessment.               Nghia Alvarez M.D

## 2019-02-11 ENCOUNTER — TRANSCRIBE ORDERS (OUTPATIENT)
Dept: ADMINISTRATIVE | Facility: HOSPITAL | Age: 44
End: 2019-02-11

## 2019-02-11 DIAGNOSIS — M62.838 SPASM OF MUSCLE: Primary | ICD-10-CM

## 2019-02-19 ENCOUNTER — HOSPITAL ENCOUNTER (OUTPATIENT)
Dept: INFUSION THERAPY | Facility: HOSPITAL | Age: 44
Discharge: HOME | End: 2019-02-19
Admitting: PHYSICAL MEDICINE & REHABILITATION

## 2019-02-19 VITALS
HEART RATE: 118 BPM | TEMPERATURE: 95.7 F | DIASTOLIC BLOOD PRESSURE: 88 MMHG | SYSTOLIC BLOOD PRESSURE: 146 MMHG | OXYGEN SATURATION: 100 % | RESPIRATION RATE: 16 BRPM

## 2019-02-19 DIAGNOSIS — G24.9 DYSTONIA: Primary | ICD-10-CM

## 2019-02-19 PROCEDURE — 95874 GUIDE NERV DESTR NEEDLE EMG: CPT

## 2019-02-19 PROCEDURE — 25010000002 ONABOTULINUMTOXINA 100 UNITS RECONSTITUTED SOLUTION: Performed by: PHYSICAL MEDICINE & REHABILITATION

## 2019-02-19 RX ORDER — MIRTAZAPINE 15 MG/1
30 TABLET, FILM COATED ORAL NIGHTLY
COMMUNITY
End: 2021-11-02

## 2019-02-19 RX ADMIN — ONABOTULINUMTOXINA 400 UNITS: 100 INJECTION, POWDER, LYOPHILIZED, FOR SOLUTION INTRADERMAL; INTRAMUSCULAR at 15:25

## 2019-02-19 NOTE — PROGRESS NOTES
Pt tolerated injections well.  AVS given with follow-up instructions.  Pt DC per ambulation with spouse @ 1600.

## 2019-02-19 NOTE — PATIENT INSTRUCTIONS
The patient has been informed that these injections can have side effects which include rash, flu-like symptoms, dry-mouth, difficulty swallowing, muscle weakness or allergic reaction.  The patient has been instructed that if any of the above symptoms should occur, the patient should go to the nearest emergency room for attention.  Watch for head droop, breathing changes and swallow problems.  Follow up in one month to assess results of today's injection and plan to re-inject in 3 months depending on response.    Call Dr. Nghia Alvarez if you have any questions or concerns.  You can reach Dr. Alvarez at [phone] [address] [facility] [address].  Call the office to make a follow-up appointment for 4 weeks from today.      Document Released: 01/20/2012 Document Revised: 05/25/2017 Document Reviewed: 06/22/2016  [employer] Interactive Patient Education © 2018 [employer]  OnabotulinumtoxinA injection (Medical Use)  What is this medicine?  ONABOTULINUMTOXINA (o na PHILIP you lye num tox in ) is a neuro-muscular blocker. This medicine is used to treat crossed eyes, eyelid spasms, severe neck muscle spasms, ankle and toe muscle spasms, and elbow, wrist, and finger muscle spasms. It is also used to treat excessive underarm sweating, to prevent chronic migraine headaches, and to treat loss of bladder control due to neurologic conditions such as multiple sclerosis or spinal cord injury.  This medicine may be used for other purposes; ask your health care provider or pharmacist if you have questions.  COMMON BRAND NAME(S): Botox  What should I tell my health care provider before I take this medicine?  They need to know if you have any of these conditions:  -breathing problems  -cerebral palsy spasms  -difficulty urinating  -heart problems  -history of surgery where this medicine is going to be used  -infection at the site where this medicine is going to be used  -myasthenia gravis or other neurologic disease  -nerve or muscle  disease  -surgery plans  -take medicines that treat or prevent blood clots  -thyroid problems  -an unusual or allergic reaction to botulinum toxin, albumin, other medicines, foods, dyes, or preservatives  -pregnant or trying to get pregnant  -breast-feeding  How should I use this medicine?  This medicine is for injection into a muscle. It is given by a health care professional in a hospital or clinic setting.  Talk to your pediatrician regarding the use of this medicine in children. While this drug may be prescribed for children as young as 12 years old for selected conditions, precautions do apply.  Overdosage: If you think you have taken too much of this medicine contact a poison control center or emergency room at once.  NOTE: This medicine is only for you. Do not share this medicine with others.  What if I miss a dose?  This does not apply.  What may interact with this medicine?  -aminoglycoside antibiotics like gentamicin, neomycin, tobramycin  -muscle relaxants  -other botulinum toxin injections  This list may not describe all possible interactions. Give your health care provider a list of all the medicines, herbs, non-prescription drugs, or dietary supplements you use. Also tell them if you smoke, drink alcohol, or use illegal drugs. Some items may interact with your medicine.  What should I watch for while using this medicine?  Visit your doctor for regular check ups.  This medicine will cause weakness in the muscle where it is injected. Tell your doctor if you feel unusually weak in other muscles. Get medical help right away if you have problems with breathing, swallowing, or talking.  This medicine might make your eyelids droop or make you see blurry or double. If you have weak muscles or trouble seeing do not drive a car, use machinery, or do other dangerous activities.  This medicine contains albumin from human blood. It may be possible to pass an infection in this medicine, but no cases have been  reported. Talk to your doctor about the risks and benefits of this medicine.  If your activities have been limited by your condition, go back to your regular routine slowly after treatment with this medicine.  What side effects may I notice from receiving this medicine?  Side effects that you should report to your doctor or health care professional as soon as possible:  -allergic reactions like skin rash, itching or hives, swelling of the face, lips, or tongue  -breathing problems  -changes in vision  -chest pain or tightness  -eye irritation, pain  -fast, irregular heartbeat  -infection  -numbness  -speech problems  -swallowing problems  -unusual weakness  Side effects that usually do not require medical attention (report to your doctor or health care professional if they continue or are bothersome):  -bruising or pain at site where injected  -drooping eyelid  -dry eyes or mouth  -headache  -muscles aches, pains  -sensitivity to light  -tearing  This list may not describe all possible side effects. Call your doctor for medical advice about side effects. You may report side effects to FDA at 9-543-FDA-2322.  Where should I keep my medicine?  This drug is given in a hospital or clinic and will not be stored at home.  NOTE: This sheet is a summary. It may not cover all possible information. If you have questions about this medicine, talk to your doctor, pharmacist, or health care provider.  © 2018 [employer]/Gold Standard (2016-01-26 15:43:53)

## 2019-02-19 NOTE — OP NOTE
DATE OF PROCEDURE: 02/19/2019      PHYSICIAN: Nghia Alvarez MD - attending      DIAGNOSES:  1. Left upper extremity and left lower extremity spasticity/dystonia.   2. History of meningitis at 18 months of age.   3. History of left arm tendon transfer for finger strength, [facility] in 1988.   4. History of plate, left forearm, 1995,       PROCEDURE PERFORMED: Botulinum toxin injection with electromyographic guidance, left upper extremity and left lower extremity.       HISTORY: The patient is a 43-year-old female with history of meningitis at 18 months of age with weakness and spasticity. She has had increased left-sided tightness in the arm and leg with increased dystonia for the last several years. She is showing a good response to previous Botox injections for dystonia with flexion and extension of the elbow and fingers as well as left foot inversion and toe curling and striatal toe as well. Her last injections were on 02/19/2018 and 06/05/2018 and 09/18/2018. After her last injection she again had a good balance between spasticity reduction and avoiding excessive weakness. Botox has worn off. She wishes to repeat the injection .           PHYSICAL EXAMINATION: She has significant spasticity spasticity/dystonia in the left upper extremity with elbow flexors greater than elbow extensors as well as finger flexors.   Her left double metal upright AFO with a lateral T-strap is being repaired. . She has inversion tone with toes curling and extension of the first toe.       Risks, benefits, and alternatives were reviewed. Risks include bleeding, infection, excessive weakness, incomplete reduction of her dystonia/spasticity, systemic spread affecting breathing or swallowing muscles, local muscle soreness. Benefits are to decrease her spasticity/dystonia and improve her comfort in position and functional use of the left arm and left leg. Consent was signed and on the chart.       DESCRIPTION OF  PROCEDURE: The skin was prepped in sterile fashion. Botulinum toxin type A-Botox at a concentration of 50 units/mL was injected with electromyographic guidance. Left biceps 25 units, brachialis 37.5 units, triceps 25 units, brachioradialis 25 units , flexor digitorum superficialis 12.5 units, posterior tibialis 125 units, flexure digitorum longus 50 units, flexor hallucis longus 40 units, flexor digitorum brevis 25 units, adductor hallucis 12.5, extensor hallucis longus  20 , Total 397.5 units injected, wastage 2.5 units,  out of 400 units drawn up for procedure. The patient tolerated the procedure well. No bleeding. Aspiration negative at all sites. Lot [id number] exp 06 2021 x four.       ASSESSMENT AND PLAN: History of left upper extremity and left lower extremity spasticity/dystonia-status post Botox injection today. Continue with her left double metal upright AFO. Monitor skin on plantar foot. Continue Baclofen 20 mg tid.  She will follow up in the office in 4 weeks for reassessment.               Nghia Alvarez M.D

## 2019-02-20 ENCOUNTER — OFFICE VISIT CONVERTED (OUTPATIENT)
Dept: ORTHOPEDIC SURGERY | Facility: CLINIC | Age: 44
End: 2019-02-20
Attending: ORTHOPAEDIC SURGERY

## 2019-02-25 ENCOUNTER — OFFICE VISIT CONVERTED (OUTPATIENT)
Dept: INTERNAL MEDICINE | Facility: CLINIC | Age: 44
End: 2019-02-25
Attending: INTERNAL MEDICINE

## 2019-03-25 ENCOUNTER — TRANSCRIBE ORDERS (OUTPATIENT)
Dept: ADMINISTRATIVE | Facility: HOSPITAL | Age: 44
End: 2019-03-25

## 2019-03-25 DIAGNOSIS — G24.9 DYSTONIA: Primary | ICD-10-CM

## 2019-04-15 ENCOUNTER — CONVERSION ENCOUNTER (OUTPATIENT)
Dept: ORTHOPEDIC SURGERY | Facility: CLINIC | Age: 44
End: 2019-04-15

## 2019-04-15 ENCOUNTER — OFFICE VISIT CONVERTED (OUTPATIENT)
Dept: ORTHOPEDIC SURGERY | Facility: CLINIC | Age: 44
End: 2019-04-15
Attending: ORTHOPAEDIC SURGERY

## 2019-04-22 ENCOUNTER — HOSPITAL ENCOUNTER (OUTPATIENT)
Dept: MRI IMAGING | Facility: HOSPITAL | Age: 44
Discharge: HOME | End: 2019-04-22
Attending: ORTHOPAEDIC SURGERY

## 2019-05-01 ENCOUNTER — CONVERSION ENCOUNTER (OUTPATIENT)
Dept: ORTHOPEDIC SURGERY | Facility: CLINIC | Age: 44
End: 2019-05-01

## 2019-05-01 ENCOUNTER — OFFICE VISIT CONVERTED (OUTPATIENT)
Dept: ORTHOPEDIC SURGERY | Facility: CLINIC | Age: 44
End: 2019-05-01
Attending: PHYSICIAN ASSISTANT

## 2019-05-28 ENCOUNTER — HOSPITAL ENCOUNTER (OUTPATIENT)
Dept: OTHER | Facility: HOSPITAL | Age: 44
Discharge: HOME | End: 2019-05-28
Attending: INTERNAL MEDICINE

## 2019-05-28 ENCOUNTER — OFFICE VISIT CONVERTED (OUTPATIENT)
Dept: INTERNAL MEDICINE | Facility: CLINIC | Age: 44
End: 2019-05-28
Attending: INTERNAL MEDICINE

## 2019-05-28 LAB
C TRACH RRNA CVX QL NAA+PROBE: NOT DETECTED
N GONORRHOEA DNA SPEC QL NAA+PROBE: NOT DETECTED

## 2019-05-30 LAB
CONV LAST MENSTURAL PERIOD: NORMAL
SPECIMEN SOURCE: NORMAL
SPECIMEN SOURCE: NORMAL
THIN PREP CVX: NORMAL

## 2019-06-04 ENCOUNTER — HOSPITAL ENCOUNTER (OUTPATIENT)
Dept: INFUSION THERAPY | Facility: HOSPITAL | Age: 44
Discharge: HOME | End: 2019-06-04
Admitting: PHYSICAL MEDICINE & REHABILITATION

## 2019-06-04 VITALS
BODY MASS INDEX: 28.93 KG/M2 | OXYGEN SATURATION: 98 % | TEMPERATURE: 98.6 F | HEART RATE: 123 BPM | RESPIRATION RATE: 20 BRPM | WEIGHT: 180 LBS | SYSTOLIC BLOOD PRESSURE: 132 MMHG | HEIGHT: 66 IN | DIASTOLIC BLOOD PRESSURE: 81 MMHG

## 2019-06-04 DIAGNOSIS — G24.9 DYSTONIA: Primary | ICD-10-CM

## 2019-06-04 PROCEDURE — 25010000002 ONABOTULINUMTOXINA 100 UNITS RECONSTITUTED SOLUTION: Performed by: PHYSICAL MEDICINE & REHABILITATION

## 2019-06-04 PROCEDURE — 95874 GUIDE NERV DESTR NEEDLE EMG: CPT

## 2019-06-04 RX ADMIN — ONABOTULINUMTOXINA 400 UNITS: 100 INJECTION, POWDER, LYOPHILIZED, FOR SOLUTION INTRADERMAL; INTRAMUSCULAR at 14:01

## 2019-06-04 NOTE — PATIENT INSTRUCTIONS
OnabotulinumtoxinA injection (Medical Use)  What is this medicine?  ONABOTULINUMTOXINA (o na PHILIP you lyletitia num tox in ) is a neuro-muscular blocker. This medicine is used to treat crossed eyes, eyelid spasms, severe neck muscle spasms, ankle and toe muscle spasms, and elbow, wrist, and finger muscle spasms. It is also used to treat excessive underarm sweating, to prevent chronic migraine headaches, and to treat loss of bladder control due to neurologic conditions such as multiple sclerosis or spinal cord injury.  This medicine may be used for other purposes; ask your health care provider or pharmacist if you have questions.  COMMON BRAND NAME(S): Botox  What should I tell my health care provider before I take this medicine?  They need to know if you have any of these conditions:  -breathing problems  -cerebral palsy spasms  -difficulty urinating  -heart problems  -history of surgery where this medicine is going to be used  -infection at the site where this medicine is going to be used  -myasthenia gravis or other neurologic disease  -nerve or muscle disease  -surgery plans  -take medicines that treat or prevent blood clots  -thyroid problems  -an unusual or allergic reaction to botulinum toxin, albumin, other medicines, foods, dyes, or preservatives  -pregnant or trying to get pregnant  -breast-feeding  How should I use this medicine?  This medicine is for injection into a muscle. It is given by a health care professional in a hospital or clinic setting.  Talk to your pediatrician regarding the use of this medicine in children. While this drug may be prescribed for children as young as 12 years old for selected conditions, precautions do apply.  Overdosage: If you think you have taken too much of this medicine contact a poison control center or emergency room at once.  NOTE: This medicine is only for you. Do not share this medicine with others.  What if I miss a dose?  This does not apply.  What may interact with  this medicine?  -aminoglycoside antibiotics like gentamicin, neomycin, tobramycin  -muscle relaxants  -other botulinum toxin injections  This list may not describe all possible interactions. Give your health care provider a list of all the medicines, herbs, non-prescription drugs, or dietary supplements you use. Also tell them if you smoke, drink alcohol, or use illegal drugs. Some items may interact with your medicine.  What should I watch for while using this medicine?  Visit your doctor for regular check ups.  This medicine will cause weakness in the muscle where it is injected. Tell your doctor if you feel unusually weak in other muscles. Get medical help right away if you have problems with breathing, swallowing, or talking.  This medicine might make your eyelids droop or make you see blurry or double. If you have weak muscles or trouble seeing do not drive a car, use machinery, or do other dangerous activities.  This medicine contains albumin from human blood. It may be possible to pass an infection in this medicine, but no cases have been reported. Talk to your doctor about the risks and benefits of this medicine.  If your activities have been limited by your condition, go back to your regular routine slowly after treatment with this medicine.  What side effects may I notice from receiving this medicine?  Side effects that you should report to your doctor or health care professional as soon as possible:  -allergic reactions like skin rash, itching or hives, swelling of the face, lips, or tongue  -breathing problems  -changes in vision  -chest pain or tightness  -eye irritation, pain  -fast, irregular heartbeat  -infection  -numbness  -speech problems  -swallowing problems  -unusual weakness  Side effects that usually do not require medical attention (report to your doctor or health care professional if they continue or are bothersome):  -bruising or pain at site where injected  -drooping eyelid  -dry eyes or  mouth  -headache  -muscles aches, pains  -sensitivity to light  -tearing  This list may not describe all possible side effects. Call your doctor for medical advice about side effects. You may report side effects to FDA at 4-794-MOY-0063.  Where should I keep my medicine?  This drug is given in a hospital or clinic and will not be stored at home.  NOTE: This sheet is a summary. It may not cover all possible information. If you have questions about this medicine, talk to your doctor, pharmacist, or health care provider.  © 2019 [employer] (2016-01-26 15:43:53)

## 2019-06-04 NOTE — OP NOTE
DATE OF PROCEDURE: 06/04/2019      PHYSICIAN: Nghia Alvarez MD - attending      DIAGNOSES:  1. Left upper extremity and left lower extremity spasticity/dystonia.   2. History of meningitis at 18 months of age.   3. History of left arm tendon transfer for finger strength, [facility] in 1988.   4. History of plate, left forearm, 1995,       PROCEDURE PERFORMED: Botulinum toxin injection with electromyographic guidance, left upper extremity and left lower extremity.       HISTORY: The patient is a 44-year-old female with history of meningitis at 18 months of age with weakness and spasticity. She has had increased left-sided tightness in the arm and leg with increased dystonia for the last several years. She is showing a good response to previous Botox injections for dystonia with flexion and extension of the elbow and fingers as well as left foot inversion and toe curling and striatal toe as well. Her last injections were on 02/19/2018 and 06/05/2018 and 09/18/2018 and 02/19/2019. After her last injection she again had a good balance between spasticity reduction and avoiding excessive weakness. Botox has worn off. She wishes to repeat the injection .      At the last clinic visit, we discussed increasing the dose to the foot intrinsics slightly and decreasing the dose to the elbow flexors slightly.          PHYSICAL EXAMINATION: She has significant spasticity spasticity/dystonia in the left upper extremity with elbow flexors greater than elbow extensors as well as finger flexors.   Her left double metal upright AFO with a lateral T-strap is being repaired. . She has inversion tone with toes curling and extension of the first toe.       Risks, benefits, and alternatives were reviewed. Risks include bleeding, infection, excessive weakness, incomplete reduction of her dystonia/spasticity, systemic spread affecting breathing or swallowing muscles, local muscle soreness. Benefits are to decrease her  spasticity/dystonia and improve her comfort in position and functional use of the left arm and left leg. Consent was signed and on the chart.       DESCRIPTION OF PROCEDURE: The skin was prepped in sterile fashion. Botulinum toxin type A-Botox at a concentration of 50 units/mL was injected with electromyographic guidance. Left biceps 25 units, brachialis 25 units, triceps 25 units, brachioradialis 12.5 units , flexor digitorum superficialis 12.5 units, posterior tibialis 125 units, flexure digitorum longus 50 units, flexor hallucis longus 40 units, flexor digitorum brevis 37.5 units, adductor hallucis 25, extensor hallucis longus  20 , Total 397.5 units injected, wastage 2.5 units,  out of 400 units drawn up for procedure. The patient tolerated the procedure well. No bleeding. Aspiration negative at all sites. Lot [id number] EXP 08 2021 TIMES TWO AND [id number] EXP 09 2021 TIMES TWO.     ASSESSMENT AND PLAN: History of left upper extremity and left lower extremity spasticity/dystonia-status post Botox injection today. Continue with her left double metal upright AFO. Monitor skin on plantar foot. Continue Baclofen 20 mg tid.  She will follow up in the office in 4 weeks for reassessment.

## 2019-06-05 LAB
Lab: DETECTED
Lab: NOT DETECTED
Lab: NOT DETECTED
SPECIMEN SOURCE: ABNORMAL
UREAPLASMA UREALYTICUM: DETECTED

## 2019-06-17 ENCOUNTER — OFFICE VISIT CONVERTED (OUTPATIENT)
Dept: INTERNAL MEDICINE | Facility: CLINIC | Age: 44
End: 2019-06-17
Attending: INTERNAL MEDICINE

## 2019-06-19 ENCOUNTER — OFFICE VISIT CONVERTED (OUTPATIENT)
Dept: ORTHOPEDIC SURGERY | Facility: CLINIC | Age: 44
End: 2019-06-19
Attending: ORTHOPAEDIC SURGERY

## 2019-07-30 ENCOUNTER — TRANSCRIBE ORDERS (OUTPATIENT)
Dept: ADMINISTRATIVE | Facility: HOSPITAL | Age: 44
End: 2019-07-30

## 2019-07-30 DIAGNOSIS — G24.9 DYSTONIA: Primary | ICD-10-CM

## 2019-08-09 ENCOUNTER — HOSPITAL ENCOUNTER (OUTPATIENT)
Dept: MAMMOGRAPHY | Facility: HOSPITAL | Age: 44
Discharge: HOME | End: 2019-08-09
Attending: INTERNAL MEDICINE

## 2019-08-21 ENCOUNTER — HOSPITAL ENCOUNTER (OUTPATIENT)
Dept: MAMMOGRAPHY | Facility: HOSPITAL | Age: 44
Discharge: HOME | End: 2019-08-21
Attending: INTERNAL MEDICINE

## 2019-08-26 ENCOUNTER — OFFICE VISIT CONVERTED (OUTPATIENT)
Dept: INTERNAL MEDICINE | Facility: CLINIC | Age: 44
End: 2019-08-26
Attending: INTERNAL MEDICINE

## 2019-08-26 ENCOUNTER — HOSPITAL ENCOUNTER (OUTPATIENT)
Dept: OTHER | Facility: HOSPITAL | Age: 44
Discharge: HOME | End: 2019-08-26
Attending: INTERNAL MEDICINE

## 2019-08-26 LAB
ALBUMIN SERPL-MCNC: 4.3 G/DL (ref 3.5–5)
ALBUMIN/GLOB SERPL: 1.5 {RATIO} (ref 1.4–2.6)
ALP SERPL-CCNC: 79 U/L (ref 42–98)
ALT SERPL-CCNC: 19 U/L (ref 10–40)
ANION GAP SERPL CALC-SCNC: 15 MMOL/L (ref 8–19)
AST SERPL-CCNC: 18 U/L (ref 15–50)
BASOPHILS # BLD AUTO: 0.08 10*3/UL (ref 0–0.2)
BASOPHILS NFR BLD AUTO: 1.5 % (ref 0–3)
BILIRUB SERPL-MCNC: 0.25 MG/DL (ref 0.2–1.3)
BUN SERPL-MCNC: 11 MG/DL (ref 5–25)
BUN/CREAT SERPL: 14 {RATIO} (ref 6–20)
CALCIUM SERPL-MCNC: 9.6 MG/DL (ref 8.7–10.4)
CHLORIDE SERPL-SCNC: 109 MMOL/L (ref 99–111)
CONV ABS IMM GRAN: 0.01 10*3/UL (ref 0–0.2)
CONV CO2: 20 MMOL/L (ref 22–32)
CONV IMMATURE GRAN: 0.2 % (ref 0–1.8)
CONV TOTAL PROTEIN: 7.2 G/DL (ref 6.3–8.2)
CREAT UR-MCNC: 0.8 MG/DL (ref 0.5–0.9)
DEPRECATED RDW RBC AUTO: 43.5 FL (ref 36.4–46.3)
EOSINOPHIL # BLD AUTO: 0.26 10*3/UL (ref 0–0.7)
EOSINOPHIL # BLD AUTO: 4.8 % (ref 0–7)
ERYTHROCYTE [DISTWIDTH] IN BLOOD BY AUTOMATED COUNT: 13 % (ref 11.7–14.4)
GFR SERPLBLD BASED ON 1.73 SQ M-ARVRAT: >60 ML/MIN/{1.73_M2}
GLOBULIN UR ELPH-MCNC: 2.9 G/DL (ref 2–3.5)
GLUCOSE SERPL-MCNC: 88 MG/DL (ref 65–99)
HCT VFR BLD AUTO: 44 % (ref 37–47)
HGB BLD-MCNC: 14.1 G/DL (ref 12–16)
LYMPHOCYTES # BLD AUTO: 2.07 10*3/UL (ref 1–5)
LYMPHOCYTES NFR BLD AUTO: 38.3 % (ref 20–45)
MCH RBC QN AUTO: 29.1 PG (ref 27–31)
MCHC RBC AUTO-ENTMCNC: 32 G/DL (ref 33–37)
MCV RBC AUTO: 90.9 FL (ref 81–99)
MONOCYTES # BLD AUTO: 0.37 10*3/UL (ref 0.2–1.2)
MONOCYTES NFR BLD AUTO: 6.9 % (ref 3–10)
NEUTROPHILS # BLD AUTO: 2.61 10*3/UL (ref 2–8)
NEUTROPHILS NFR BLD AUTO: 48.3 % (ref 30–85)
NRBC CBCN: 0 % (ref 0–0.7)
OSMOLALITY SERPL CALC.SUM OF ELEC: 289 MOSM/KG (ref 273–304)
PLATELET # BLD AUTO: 265 10*3/UL (ref 130–400)
PMV BLD AUTO: 11.1 FL (ref 9.4–12.3)
POTASSIUM SERPL-SCNC: 4 MMOL/L (ref 3.5–5.3)
RBC # BLD AUTO: 4.84 10*6/UL (ref 4.2–5.4)
SODIUM SERPL-SCNC: 140 MMOL/L (ref 135–147)
T4 FREE SERPL-MCNC: 0.8 NG/DL (ref 0.9–1.8)
TSH SERPL-ACNC: 2.16 M[IU]/L (ref 0.27–4.2)
WBC # BLD AUTO: 5.4 10*3/UL (ref 4.8–10.8)

## 2019-08-28 ENCOUNTER — HOSPITAL ENCOUNTER (OUTPATIENT)
Dept: OTHER | Facility: HOSPITAL | Age: 44
Setting detail: RECURRING SERIES
Discharge: HOME | End: 2019-11-15

## 2019-09-13 PROBLEM — M62.838 MUSCLE SPASTICITY: Status: ACTIVE | Noted: 2019-09-13

## 2019-09-17 ENCOUNTER — HOSPITAL ENCOUNTER (OUTPATIENT)
Dept: INFUSION THERAPY | Facility: HOSPITAL | Age: 44
Discharge: HOME | End: 2019-09-17
Admitting: PHYSICAL MEDICINE & REHABILITATION

## 2019-09-17 VITALS
HEART RATE: 114 BPM | TEMPERATURE: 98.1 F | OXYGEN SATURATION: 96 % | RESPIRATION RATE: 16 BRPM | SYSTOLIC BLOOD PRESSURE: 136 MMHG | DIASTOLIC BLOOD PRESSURE: 84 MMHG

## 2019-09-17 DIAGNOSIS — M62.838 MUSCLE SPASTICITY: ICD-10-CM

## 2019-09-17 DIAGNOSIS — G24.9 DYSTONIA: Primary | ICD-10-CM

## 2019-09-17 PROCEDURE — 25010000002 ONABOTULINUMTOXINA 100 UNITS RECONSTITUTED SOLUTION: Performed by: PHYSICAL MEDICINE & REHABILITATION

## 2019-09-17 PROCEDURE — 95874 GUIDE NERV DESTR NEEDLE EMG: CPT

## 2019-09-17 RX ADMIN — ONABOTULINUMTOXINA 400 UNITS: 100 INJECTION, POWDER, LYOPHILIZED, FOR SOLUTION INTRADERMAL; INTRAMUSCULAR at 13:47

## 2019-09-17 NOTE — OP NOTE
DATE OF PROCEDURE: 09/17/2019      PHYSICIAN: Nghia Alvarez MD - attending      DIAGNOSES:  1. Left upper extremity and left lower extremity spasticity/dystonia.   2. History of meningitis at 18 months of age.   3. History of left arm tendon transfer for finger strength, [facility] in 1988.   4. History of plate, left forearm, 1995,       PROCEDURE PERFORMED: Botulinum toxin injection with electromyographic guidance, left upper extremity and left lower extremity.       HISTORY: The patient is a 44-year-old female with history of meningitis at 18 months of age with weakness and spasticity. She has had increased left-sided tightness in the arm and leg with increased dystonia for the last several years. She is showing a good response to previous Botox injections for dystonia with flexion and extension of the elbow and fingers as well as left foot inversion and toe curling and striatal toe as well. Her last injections were on 02/19/2018 and 06/05/2018 and 09/18/2018 and 02/19/2019 and 06/04/2019. After her last injection she again had a good balance between spasticity reduction and avoiding excessive weakness. Botox has worn off. She wishes to repeat the injection .       At the last clinic visit, we discussed dropping off the dose to the [facility].          PHYSICAL EXAMINATION: She has significant spasticity spasticity/dystonia in the left upper extremity with elbow flexors greater than elbow extensors as well as finger flexors.   Her left double metal upright AFO with a lateral T-strapn. . She has inversion tone with toes curling  .       Risks, benefits, and alternatives were reviewed. Risks include bleeding, infection, excessive weakness, incomplete reduction of her dystonia/spasticity, systemic spread affecting breathing or swallowing muscles, local muscle soreness. Benefits are to decrease her spasticity/dystonia and improve her comfort in position and functional use of the left arm and left leg.  Consent was signed and on the chart.       DESCRIPTION OF PROCEDURE: The skin was prepped in sterile fashion. Botulinum toxin type A-Botox at a concentration of 50 units/mL was injected with electromyographic guidance. Left biceps 25 units, brachialis 25 units, triceps 25 units, brachioradialis 12.5 units , flexor digitorum superficialis 12.5 units, posterior tibialis 125 units, flexure digitorum longus 50 units, flexor hallucis longus 40 units, flexor digitorum brevis 37.5 units, adductor hallucis 25,  , Total 377.5 units injected, wastage 22.5 units,  out of 400 units drawn up for procedure. The patient tolerated the procedure well. No bleeding. Aspiration negative at all sites. Lot [id number] exp 01 2022 time two, [id number] exp 02 2022 times one, and [id number] exp 02 2022 times one.    ASSESSMENT AND PLAN: History of left upper extremity and left lower extremity spasticity/dystonia-status post Botox injection today. Continue with her left double metal upright AFO. Monitor skin on plantar foot. Continue Baclofen 20 mg tid.  She will follow up in the office in 3-4 weeks for reassessment.       Nghia Alvarez MD

## 2019-09-17 NOTE — PROGRESS NOTES
Medications added to the MAR by the ACU nurse were used for procedure by provider.  See provider procedure dictation for details regarding provider's method and timing of administration as well as dosages.

## 2019-09-17 NOTE — PROGRESS NOTES
Patient tolerated procedure well.  AVS given and patient verbalized understanding of instructions by Dr. Alvarez. Discharged home amb(left foot brace) with  at 1442.

## 2019-09-17 NOTE — PATIENT INSTRUCTIONS
OnabotulinumtoxinA injection (Medical Use)  What is this medicine?  ONABOTULINUMTOXINA (o na PHILIP you lyletitia num tox in ) is a neuro-muscular blocker. This medicine is used to treat crossed eyes, eyelid spasms, severe neck muscle spasms, ankle and toe muscle spasms, and elbow, wrist, and finger muscle spasms. It is also used to treat excessive underarm sweating, to prevent chronic migraine headaches, and to treat loss of bladder control due to neurologic conditions such as multiple sclerosis or spinal cord injury.  This medicine may be used for other purposes; ask your health care provider or pharmacist if you have questions.  COMMON BRAND NAME(S): Botox  What should I tell my health care provider before I take this medicine?  They need to know if you have any of these conditions:  -breathing problems  -cerebral palsy spasms  -difficulty urinating  -heart problems  -history of surgery where this medicine is going to be used  -infection at the site where this medicine is going to be used  -myasthenia gravis or other neurologic disease  -nerve or muscle disease  -surgery plans  -take medicines that treat or prevent blood clots  -thyroid problems  -an unusual or allergic reaction to botulinum toxin, albumin, other medicines, foods, dyes, or preservatives  -pregnant or trying to get pregnant  -breast-feeding  How should I use this medicine?  This medicine is for injection into a muscle. It is given by a health care professional in a hospital or clinic setting.  Talk to your pediatrician regarding the use of this medicine in children. While this drug may be prescribed for children as young as 12 years old for selected conditions, precautions do apply.  Overdosage: If you think you have taken too much of this medicine contact a poison control center or emergency room at once.  NOTE: This medicine is only for you. Do not share this medicine with others.  What if I miss a dose?  This does not apply.  What may interact with  this medicine?  -aminoglycoside antibiotics like gentamicin, neomycin, tobramycin  -muscle relaxants  -other botulinum toxin injections  This list may not describe all possible interactions. Give your health care provider a list of all the medicines, herbs, non-prescription drugs, or dietary supplements you use. Also tell them if you smoke, drink alcohol, or use illegal drugs. Some items may interact with your medicine.  What should I watch for while using this medicine?  Visit your doctor for regular check ups.  This medicine will cause weakness in the muscle where it is injected. Tell your doctor if you feel unusually weak in other muscles. Get medical help right away if you have problems with breathing, swallowing, or talking.  This medicine might make your eyelids droop or make you see blurry or double. If you have weak muscles or trouble seeing do not drive a car, use machinery, or do other dangerous activities.  This medicine contains albumin from human blood. It may be possible to pass an infection in this medicine, but no cases have been reported. Talk to your doctor about the risks and benefits of this medicine.  If your activities have been limited by your condition, go back to your regular routine slowly after treatment with this medicine.  What side effects may I notice from receiving this medicine?  Side effects that you should report to your doctor or health care professional as soon as possible:  -allergic reactions like skin rash, itching or hives, swelling of the face, lips, or tongue  -breathing problems  -changes in vision  -chest pain or tightness  -eye irritation, pain  -fast, irregular heartbeat  -infection  -numbness  -speech problems  -swallowing problems  -unusual weakness  Side effects that usually do not require medical attention (report to your doctor or health care professional if they continue or are bothersome):  -bruising or pain at site where injected  -drooping eyelid  -dry eyes or  mouth  -headache  -muscles aches, pains  -sensitivity to light  -tearing  This list may not describe all possible side effects. Call your doctor for medical advice about side effects. You may report side effects to FDA at 8-982-SAI-3554.  Where should I keep my medicine?  This drug is given in a hospital or clinic and will not be stored at home.  NOTE: This sheet is a summary. It may not cover all possible information. If you have questions about this medicine, talk to your doctor, pharmacist, or health care provider.  © 2019 Elsevier/Gold Standard (2016-01-26 15:43:53)  The patient has been informed that these injections can have side effects which include rash, flu-like symptoms, dry-mouth, difficulty swallowing, muscle weakness or allergic reaction.  The patient has been instructed that if any of the above symptoms should occur, the patient should go to the nearest emergency room for attention.  Watch for head droop, breathing changes and swallow problems.  Follow up in one month to assess results of today's injection and plan to re-inject in 3 months depending on response.    Call Dr. Nghia Alvarez if you have any questions or concerns.  You can reach   Dr. Alvarez at [phone].  Office address [address], [address].

## 2019-10-23 ENCOUNTER — TRANSCRIBE ORDERS (OUTPATIENT)
Dept: ADMINISTRATIVE | Facility: HOSPITAL | Age: 44
End: 2019-10-23

## 2019-10-23 DIAGNOSIS — G24.9 DYSTONIA: Primary | ICD-10-CM

## 2019-12-12 ENCOUNTER — HOSPITAL ENCOUNTER (OUTPATIENT)
Dept: OTHER | Facility: HOSPITAL | Age: 44
Discharge: HOME | End: 2019-12-12
Attending: INTERNAL MEDICINE

## 2019-12-12 ENCOUNTER — OFFICE VISIT CONVERTED (OUTPATIENT)
Dept: INTERNAL MEDICINE | Facility: CLINIC | Age: 44
End: 2019-12-12
Attending: INTERNAL MEDICINE

## 2019-12-12 LAB
ALBUMIN SERPL-MCNC: 4.2 G/DL (ref 3.5–5)
ALBUMIN/GLOB SERPL: 1.6 {RATIO} (ref 1.4–2.6)
ALP SERPL-CCNC: 72 U/L (ref 42–98)
ALT SERPL-CCNC: 19 U/L (ref 10–40)
ANION GAP SERPL CALC-SCNC: 16 MMOL/L (ref 8–19)
AST SERPL-CCNC: 17 U/L (ref 15–50)
BASOPHILS # BLD AUTO: 0.05 10*3/UL (ref 0–0.2)
BASOPHILS NFR BLD AUTO: 0.9 % (ref 0–3)
BILIRUB SERPL-MCNC: <0.15 MG/DL (ref 0.2–1.3)
BUN SERPL-MCNC: 11 MG/DL (ref 5–25)
BUN/CREAT SERPL: 14 {RATIO} (ref 6–20)
CALCIUM SERPL-MCNC: 8.8 MG/DL (ref 8.7–10.4)
CHLORIDE SERPL-SCNC: 110 MMOL/L (ref 99–111)
CONV ABS IMM GRAN: 0.02 10*3/UL (ref 0–0.2)
CONV CO2: 19 MMOL/L (ref 22–32)
CONV IMMATURE GRAN: 0.3 % (ref 0–1.8)
CONV TOTAL PROTEIN: 6.8 G/DL (ref 6.3–8.2)
CREAT UR-MCNC: 0.78 MG/DL (ref 0.5–0.9)
DEPRECATED RDW RBC AUTO: 40.7 FL (ref 36.4–46.3)
EOSINOPHIL # BLD AUTO: 0.11 10*3/UL (ref 0–0.7)
EOSINOPHIL # BLD AUTO: 1.9 % (ref 0–7)
ERYTHROCYTE [DISTWIDTH] IN BLOOD BY AUTOMATED COUNT: 12.3 % (ref 11.7–14.4)
GFR SERPLBLD BASED ON 1.73 SQ M-ARVRAT: >60 ML/MIN/{1.73_M2}
GLOBULIN UR ELPH-MCNC: 2.6 G/DL (ref 2–3.5)
GLUCOSE SERPL-MCNC: 80 MG/DL (ref 65–99)
HCT VFR BLD AUTO: 40.5 % (ref 37–47)
HGB BLD-MCNC: 13.1 G/DL (ref 12–16)
LYMPHOCYTES # BLD AUTO: 2.02 10*3/UL (ref 1–5)
LYMPHOCYTES NFR BLD AUTO: 35 % (ref 20–45)
MCH RBC QN AUTO: 29.1 PG (ref 27–31)
MCHC RBC AUTO-ENTMCNC: 32.3 G/DL (ref 33–37)
MCV RBC AUTO: 90 FL (ref 81–99)
MONOCYTES # BLD AUTO: 0.44 10*3/UL (ref 0.2–1.2)
MONOCYTES NFR BLD AUTO: 7.6 % (ref 3–10)
NEUTROPHILS # BLD AUTO: 3.13 10*3/UL (ref 2–8)
NEUTROPHILS NFR BLD AUTO: 54.3 % (ref 30–85)
NRBC CBCN: 0 % (ref 0–0.7)
OSMOLALITY SERPL CALC.SUM OF ELEC: 290 MOSM/KG (ref 273–304)
PLATELET # BLD AUTO: 261 10*3/UL (ref 130–400)
PMV BLD AUTO: 11.1 FL (ref 9.4–12.3)
POTASSIUM SERPL-SCNC: 3.9 MMOL/L (ref 3.5–5.3)
RBC # BLD AUTO: 4.5 10*6/UL (ref 4.2–5.4)
SODIUM SERPL-SCNC: 141 MMOL/L (ref 135–147)
T4 FREE SERPL-MCNC: 0.8 NG/DL (ref 0.9–1.8)
TSH SERPL-ACNC: 0.98 M[IU]/L (ref 0.27–4.2)
WBC # BLD AUTO: 5.77 10*3/UL (ref 4.8–10.8)

## 2020-01-07 ENCOUNTER — HOSPITAL ENCOUNTER (OUTPATIENT)
Dept: INFUSION THERAPY | Facility: HOSPITAL | Age: 45
Discharge: HOME | End: 2020-01-07

## 2020-01-07 DIAGNOSIS — M62.838 MUSCLE SPASTICITY: Primary | ICD-10-CM

## 2020-01-07 DIAGNOSIS — G24.9 DYSTONIA: ICD-10-CM

## 2020-01-07 NOTE — PROGRESS NOTES
The patient has been informed that these injections can have side effects which include rash, flu-like symptoms, dry-mouth, difficulty swallowing, muscle weakness or allergic reaction.  The patient has been instructed that if any of the above symptoms should occur, the patient should go to the nearest emergency room for attention.  Watch for head droop, breathing changes and swallow problems.  Follow up in one month to assess results of today's injection and plan to re-inject in 3 months depending on response.    Call Dr. Nghia Alvarez if you have any questions or concerns.  You can reach   Dr. Alvarez at [phone].  Office address [address], [address].

## 2020-01-07 NOTE — PATIENT INSTRUCTIONS
The patient has been informed that these injections can have side effects which include rash, flu-like symptoms, dry-mouth, difficulty swallowing, muscle weakness or allergic reaction.  The patient has been instructed that if any of the above symptoms should occur, the patient should go to the nearest emergency room for attention.  Watch for head droop, breathing changes and swallow problems.  Follow up in one month to assess results of today's injection and plan to re-inject in 3 months depending on response.    Call Dr. Nghia Alvarez if you have any questions or concerns.  You can reach   Dr. Alvarez at [phone].  Office address [address] [facility], [address].      OnabotulinumtoxinA injection (Medical Use)  What is this medicine?  ONABOTULINUMTOXINA (o na PHILIP you johnny num tox in eh) is a neuro-muscular blocker. This medicine is used to treat crossed eyes, eyelid spasms, severe neck muscle spasms, ankle and toe muscle spasms, and elbow, wrist, and finger muscle spasms. It is also used to treat excessive underarm sweating, to prevent chronic migraine headaches, and to treat loss of bladder control due to neurologic conditions such as multiple sclerosis or spinal cord injury.  This medicine may be used for other purposes; ask your health care provider or pharmacist if you have questions.  COMMON BRAND NAME(S): Botox  What should I tell my health care provider before I take this medicine?  They need to know if you have any of these conditions:  -breathing problems  -cerebral palsy spasms  -difficulty urinating  -heart problems  -history of surgery where this medicine is going to be used  -infection at the site where this medicine is going to be used  -myasthenia gravis or other neurologic disease  -nerve or muscle disease  -surgery plans  -take medicines that treat or prevent blood clots  -thyroid problems  -an unusual or allergic reaction to botulinum toxin, albumin, other medicines, foods, dyes, or  preservatives  -pregnant or trying to get pregnant  -breast-feeding  How should I use this medicine?  This medicine is for injection into a muscle. It is given by a health care professional in a hospital or clinic setting.  Talk to your pediatrician regarding the use of this medicine in children. While this drug may be prescribed for children as young as 2 years old for selected conditions, precautions do apply.  Overdosage: If you think you have taken too much of this medicine contact a poison control center or emergency room at once.  NOTE: This medicine is only for you. Do not share this medicine with others.  What if I miss a dose?  This does not apply.  What may interact with this medicine?  -aminoglycoside antibiotics like gentamicin, neomycin, tobramycin  -muscle relaxants  -other botulinum toxin injections  This list may not describe all possible interactions. Give your health care provider a list of all the medicines, herbs, non-prescription drugs, or dietary supplements you use. Also tell them if you smoke, drink alcohol, or use illegal drugs. Some items may interact with your medicine.  What should I watch for while using this medicine?  Visit your doctor for regular check ups.  This medicine will cause weakness in the muscle where it is injected. Tell your doctor if you feel unusually weak in other muscles. Get medical help right away if you have problems with breathing, swallowing, or talking.  This medicine might make your eyelids droop or make you see blurry or double. If you have weak muscles or trouble seeing do not drive a car, use machinery, or do other dangerous activities.  This medicine contains albumin from human blood. It may be possible to pass an infection in this medicine, but no cases have been reported. Talk to your doctor about the risks and benefits of this medicine.  If your activities have been limited by your condition, go back to your regular routine slowly after treatment with  this medicine.  What side effects may I notice from receiving this medicine?  Side effects that you should report to your doctor or health care professional as soon as possible:  -allergic reactions like skin rash, itching or hives, swelling of the face, lips, or tongue  -breathing problems  -changes in vision  -chest pain or tightness  -eye irritation, pain  -fast, irregular heartbeat  -infection  -numbness  -speech problems  -swallowing problems  -unusual weakness  Side effects that usually do not require medical attention (report to your doctor or health care professional if they continue or are bothersome):  -bruising or pain at site where injected  -drooping eyelid  -dry eyes or mouth  -headache  -muscles aches, pains  -sensitivity to light  -tearing  This list may not describe all possible side effects. Call your doctor for medical advice about side effects. You may report side effects to FDA at 2-185-FDA-4541.  Where should I keep my medicine?  This drug is given in a hospital or clinic and will not be stored at home.  NOTE: This sheet is a summary. It may not cover all possible information. If you have questions about this medicine, talk to your doctor, pharmacist, or health care provider.  © 2019 Elsevier/Gold Standard (2019-06-24 14:21:42)

## 2020-02-04 ENCOUNTER — HOSPITAL ENCOUNTER (OUTPATIENT)
Dept: INFUSION THERAPY | Facility: HOSPITAL | Age: 45
Discharge: HOME | End: 2020-02-04
Admitting: PHYSICAL MEDICINE & REHABILITATION

## 2020-02-04 VITALS
SYSTOLIC BLOOD PRESSURE: 131 MMHG | RESPIRATION RATE: 20 BRPM | TEMPERATURE: 98.1 F | OXYGEN SATURATION: 98 % | HEART RATE: 101 BPM | DIASTOLIC BLOOD PRESSURE: 86 MMHG

## 2020-02-04 DIAGNOSIS — M62.838 MUSCLE SPASTICITY: Primary | ICD-10-CM

## 2020-02-04 DIAGNOSIS — G24.9 DYSTONIA: ICD-10-CM

## 2020-02-04 PROCEDURE — 95874 GUIDE NERV DESTR NEEDLE EMG: CPT

## 2020-02-04 PROCEDURE — 25010000002 ONABOTULINUMTOXINA 100 UNITS RECONSTITUTED SOLUTION: Performed by: PHYSICAL MEDICINE & REHABILITATION

## 2020-02-04 RX ORDER — SUMATRIPTAN 100 MG/1
100 TABLET, FILM COATED ORAL
COMMUNITY
End: 2021-11-02

## 2020-02-04 RX ORDER — BUSPIRONE HYDROCHLORIDE 15 MG/1
15 TABLET ORAL 3 TIMES DAILY
COMMUNITY

## 2020-02-04 RX ORDER — FLUTICASONE PROPIONATE 50 MCG
2 SPRAY, SUSPENSION (ML) NASAL DAILY
COMMUNITY
End: 2021-11-02 | Stop reason: SDUPTHER

## 2020-02-04 RX ORDER — TOPIRAMATE 50 MG/1
100 TABLET, FILM COATED ORAL 2 TIMES DAILY
COMMUNITY
End: 2021-07-26 | Stop reason: SDUPTHER

## 2020-02-04 RX ORDER — GABAPENTIN 300 MG/1
300 CAPSULE ORAL 3 TIMES DAILY
COMMUNITY
End: 2021-08-02 | Stop reason: SDUPTHER

## 2020-02-04 RX ORDER — VARENICLINE TARTRATE 1 MG/1
1 TABLET, FILM COATED ORAL 2 TIMES DAILY
COMMUNITY
End: 2021-11-02

## 2020-02-04 RX ADMIN — ONABOTULINUMTOXINA 400 UNITS: 100 INJECTION, POWDER, LYOPHILIZED, FOR SOLUTION INTRADERMAL; INTRAMUSCULAR at 15:16

## 2020-02-04 NOTE — OP NOTE
DATE OF PROCEDURE: February 4, 2020      PHYSICIAN: Nghia Alvarez MD - attending      DIAGNOSES:  1. Left upper extremity and left lower extremity spasticity/dystonia.   2. History of meningitis at 18 months of age.   3. History of left arm tendon transfer for finger strength, [facility] in 1988.   4. History of plate, left forearm, 1995,       PROCEDURE PERFORMED: Botulinum toxin injection with electromyographic guidance, left upper extremity and left lower extremity.       HISTORY: The patient is a 44-year-old female with history of meningitis at 18 months of age with weakness and spasticity. She has had increased left-sided tightness in the arm and leg with increased dystonia for the last several years. She is showing a good response to previous Botox injections for dystonia with flexion and extension of the elbow and fingers as well as left foot inversion and toe curling. Her last injections were on 02/19/2018 and 06/05/2018 and 09/18/2018 and 02/19/2019 and 06/04/2019 and 09/17/2019.   She wishes to repeat the injection .             PHYSICAL EXAMINATION: She has significant spasticity spasticity/dystonia in the left upper extremity with elbow flexors greater than elbow extensors as well as finger flexors.   Her left double metal upright AFO with a lateral T-strapn. . She has inversion tone with toes curling  .       Risks, benefits, and alternatives were reviewed. Risks include bleeding, infection, excessive weakness, incomplete reduction of her dystonia/spasticity, systemic spread affecting breathing or swallowing muscles, local muscle soreness. Benefits are to decrease her spasticity/dystonia and improve her comfort in position and functional use of the left arm and left leg. Consent was signed and on the chart.       DESCRIPTION OF PROCEDURE: The skin was prepped in sterile fashion. Botulinum toxin type A-Botox at a concentration of 50 units/mL was injected with electromyographic guidance.  Left biceps 25 units, brachialis 25 units, triceps 25 units, brachioradialis 12.5 units , flexor digitorum superficialis 12.5 units, posterior tibialis 125 units, flexure digitorum longus 50 units, flexor hallucis longus 40 units, flexor digitorum brevis 37.5 units, adductor hallucis 25,  , Total 377.5 units injected, wastage 22.5 units,  out of 400 units drawn up for procedure. The patient tolerated the procedure well. No bleeding. Aspiration negative at all sites. Lot [id number] exp 05 2022 time three, [id number] exp 05 2022 times one.    ASSESSMENT AND PLAN: History of left upper extremity and left lower extremity spasticity/dystonia-status post Botox injection today. Continue with her left double metal upright AFO. Monitor skin on plantar foot. Continue Baclofen 20 mg tid.  She will follow up in the office in 3-4 weeks for reassessment.       Nghia Alvarez MD

## 2020-02-04 NOTE — PROGRESS NOTES
The patient has been informed that these injections can have side effects which include rash, flu-like symptoms, dry-mouth, difficulty swallowing, muscle weakness or allergic reaction.  The patient has been instructed that if any of the above symptoms should occur, the patient should go to the nearest emergency room for attention.  Watch for head droop, breathing changes and swallow problems.  Follow up in one month to assess results of today's injection and plan to re-inject in 3 months depending on response.    Call Dr. Nghia Alvarez if you have any questions or concerns.  You can reach   Dr. Alvarez at [phone].  Office address [address] [facility] [address] [facility], [address].    Tolerated well. AVS given

## 2020-02-04 NOTE — PATIENT INSTRUCTIONS
OnabotulinumtoxinA injection (Medical Use)  What is this medicine?  ONABOTULINUMTOXINA (o na PHILIP you lyletitia num tox in ) is a neuro-muscular blocker. This medicine is used to treat crossed eyes, eyelid spasms, severe neck muscle spasms, ankle and toe muscle spasms, and elbow, wrist, and finger muscle spasms. It is also used to treat excessive underarm sweating, to prevent chronic migraine headaches, and to treat loss of bladder control due to neurologic conditions such as multiple sclerosis or spinal cord injury.  This medicine may be used for other purposes; ask your health care provider or pharmacist if you have questions.  COMMON BRAND NAME(S): Botox  What should I tell my health care provider before I take this medicine?  They need to know if you have any of these conditions:  -breathing problems  -cerebral palsy spasms  -difficulty urinating  -heart problems  -history of surgery where this medicine is going to be used  -infection at the site where this medicine is going to be used  -myasthenia gravis or other neurologic disease  -nerve or muscle disease  -surgery plans  -take medicines that treat or prevent blood clots  -thyroid problems  -an unusual or allergic reaction to botulinum toxin, albumin, other medicines, foods, dyes, or preservatives  -pregnant or trying to get pregnant  -breast-feeding  How should I use this medicine?  This medicine is for injection into a muscle. It is given by a health care professional in a hospital or clinic setting.  Talk to your pediatrician regarding the use of this medicine in children. While this drug may be prescribed for children as young as 2 years old for selected conditions, precautions do apply.  Overdosage: If you think you have taken too much of this medicine contact a poison control center or emergency room at once.  NOTE: This medicine is only for you. Do not share this medicine with others.  What if I miss a dose?  This does not apply.  What may interact with  this medicine?  -aminoglycoside antibiotics like gentamicin, neomycin, tobramycin  -muscle relaxants  -other botulinum toxin injections  This list may not describe all possible interactions. Give your health care provider a list of all the medicines, herbs, non-prescription drugs, or dietary supplements you use. Also tell them if you smoke, drink alcohol, or use illegal drugs. Some items may interact with your medicine.  What should I watch for while using this medicine?  Visit your doctor for regular check ups.  This medicine will cause weakness in the muscle where it is injected. Tell your doctor if you feel unusually weak in other muscles. Get medical help right away if you have problems with breathing, swallowing, or talking.  This medicine might make your eyelids droop or make you see blurry or double. If you have weak muscles or trouble seeing do not drive a car, use machinery, or do other dangerous activities.  This medicine contains albumin from human blood. It may be possible to pass an infection in this medicine, but no cases have been reported. Talk to your doctor about the risks and benefits of this medicine.  If your activities have been limited by your condition, go back to your regular routine slowly after treatment with this medicine.  What side effects may I notice from receiving this medicine?  Side effects that you should report to your doctor or health care professional as soon as possible:  -allergic reactions like skin rash, itching or hives, swelling of the face, lips, or tongue  -breathing problems  -changes in vision  -chest pain or tightness  -eye irritation, pain  -fast, irregular heartbeat  -infection  -numbness  -speech problems  -swallowing problems  -unusual weakness  Side effects that usually do not require medical attention (report to your doctor or health care professional if they continue or are bothersome):  -bruising or pain at site where injected  -drooping eyelid  -dry eyes or  mouth  -headache  -muscles aches, pains  -sensitivity to light  -tearing  This list may not describe all possible side effects. Call your doctor for medical advice about side effects. You may report side effects to FDA at 6-362-OAM-5260.  Where should I keep my medicine?  This drug is given in a hospital or clinic and will not be stored at home.  NOTE: This sheet is a summary. It may not cover all possible information. If you have questions about this medicine, talk to your doctor, pharmacist, or health care provider.  © 2019 Elsevier/Gold Standard (2019-06-24 14:21:42)      Follow-up with Dr Alvarez in 1 month       New address  [address] [place] [address]

## 2020-03-26 ENCOUNTER — TELEMEDICINE CONVERTED (OUTPATIENT)
Dept: INTERNAL MEDICINE | Facility: CLINIC | Age: 45
End: 2020-03-26
Attending: INTERNAL MEDICINE

## 2020-03-27 ENCOUNTER — HOSPITAL ENCOUNTER (OUTPATIENT)
Dept: OTHER | Facility: HOSPITAL | Age: 45
Discharge: HOME | End: 2020-03-27
Attending: INTERNAL MEDICINE

## 2020-03-27 LAB
ALBUMIN SERPL-MCNC: 4.1 G/DL (ref 3.5–5)
ALBUMIN/GLOB SERPL: 1.6 {RATIO} (ref 1.4–2.6)
ALP SERPL-CCNC: 74 U/L (ref 42–98)
ALT SERPL-CCNC: 47 U/L (ref 10–40)
ANION GAP SERPL CALC-SCNC: 17 MMOL/L (ref 8–19)
AST SERPL-CCNC: 30 U/L (ref 15–50)
BASOPHILS # BLD AUTO: 0.06 10*3/UL (ref 0–0.2)
BASOPHILS NFR BLD AUTO: 1.3 % (ref 0–3)
BILIRUB SERPL-MCNC: 0.22 MG/DL (ref 0.2–1.3)
BUN SERPL-MCNC: 9 MG/DL (ref 5–25)
BUN/CREAT SERPL: 12 {RATIO} (ref 6–20)
CALCIUM SERPL-MCNC: 9.1 MG/DL (ref 8.7–10.4)
CHLORIDE SERPL-SCNC: 103 MMOL/L (ref 99–111)
CONV ABS IMM GRAN: 0.01 10*3/UL (ref 0–0.2)
CONV CO2: 22 MMOL/L (ref 22–32)
CONV IMMATURE GRAN: 0.2 % (ref 0–1.8)
CONV TOTAL PROTEIN: 6.6 G/DL (ref 6.3–8.2)
CREAT UR-MCNC: 0.78 MG/DL (ref 0.5–0.9)
DEPRECATED RDW RBC AUTO: 39.5 FL (ref 36.4–46.3)
EOSINOPHIL # BLD AUTO: 0.16 10*3/UL (ref 0–0.7)
EOSINOPHIL # BLD AUTO: 3.4 % (ref 0–7)
ERYTHROCYTE [DISTWIDTH] IN BLOOD BY AUTOMATED COUNT: 12.1 % (ref 11.7–14.4)
GFR SERPLBLD BASED ON 1.73 SQ M-ARVRAT: >60 ML/MIN/{1.73_M2}
GLOBULIN UR ELPH-MCNC: 2.5 G/DL (ref 2–3.5)
GLUCOSE SERPL-MCNC: 97 MG/DL (ref 65–99)
HCT VFR BLD AUTO: 40.7 % (ref 37–47)
HGB BLD-MCNC: 13.1 G/DL (ref 12–16)
LYMPHOCYTES # BLD AUTO: 1.92 10*3/UL (ref 1–5)
LYMPHOCYTES NFR BLD AUTO: 40.7 % (ref 20–45)
MCH RBC QN AUTO: 28.8 PG (ref 27–31)
MCHC RBC AUTO-ENTMCNC: 32.2 G/DL (ref 33–37)
MCV RBC AUTO: 89.5 FL (ref 81–99)
MONOCYTES # BLD AUTO: 0.38 10*3/UL (ref 0.2–1.2)
MONOCYTES NFR BLD AUTO: 8.1 % (ref 3–10)
NEUTROPHILS # BLD AUTO: 2.19 10*3/UL (ref 2–8)
NEUTROPHILS NFR BLD AUTO: 46.3 % (ref 30–85)
NRBC CBCN: 0 % (ref 0–0.7)
OSMOLALITY SERPL CALC.SUM OF ELEC: 285 MOSM/KG (ref 273–304)
PLATELET # BLD AUTO: 252 10*3/UL (ref 130–400)
PMV BLD AUTO: 11 FL (ref 9.4–12.3)
POTASSIUM SERPL-SCNC: 4.3 MMOL/L (ref 3.5–5.3)
RBC # BLD AUTO: 4.55 10*6/UL (ref 4.2–5.4)
SODIUM SERPL-SCNC: 138 MMOL/L (ref 135–147)
T4 FREE SERPL-MCNC: 0.7 NG/DL (ref 0.9–1.8)
TSH SERPL-ACNC: 1.87 M[IU]/L (ref 0.27–4.2)
WBC # BLD AUTO: 4.72 10*3/UL (ref 4.8–10.8)

## 2020-04-08 ENCOUNTER — OFFICE VISIT CONVERTED (OUTPATIENT)
Dept: ORTHOPEDIC SURGERY | Facility: CLINIC | Age: 45
End: 2020-04-08
Attending: ORTHOPAEDIC SURGERY

## 2020-04-14 ENCOUNTER — OFFICE VISIT CONVERTED (OUTPATIENT)
Dept: INTERNAL MEDICINE | Facility: CLINIC | Age: 45
End: 2020-04-14
Attending: INTERNAL MEDICINE

## 2020-04-23 ENCOUNTER — HOSPITAL ENCOUNTER (OUTPATIENT)
Dept: GENERAL RADIOLOGY | Facility: HOSPITAL | Age: 45
Discharge: HOME | End: 2020-04-23
Attending: ORTHOPAEDIC SURGERY

## 2020-04-27 ENCOUNTER — HOSPITAL ENCOUNTER (OUTPATIENT)
Dept: OTHER | Facility: HOSPITAL | Age: 45
Discharge: HOME | End: 2020-04-27
Attending: INTERNAL MEDICINE

## 2020-04-27 LAB
T4 FREE SERPL-MCNC: 0.6 NG/DL (ref 0.9–1.8)
TSH SERPL-ACNC: 1.53 M[IU]/L (ref 0.27–4.2)

## 2020-04-28 LAB
CONV ANTI MICROSOMAL AB: 11 IU/ML (ref 0–34)
THYROGLOBULIN ANTIBODY: <1 IU/ML (ref 0–0.9)

## 2020-05-12 ENCOUNTER — TRANSCRIBE ORDERS (OUTPATIENT)
Dept: ADMINISTRATIVE | Facility: HOSPITAL | Age: 45
End: 2020-05-12

## 2020-05-12 DIAGNOSIS — G24.9 DYSTONIA: Primary | ICD-10-CM

## 2020-05-27 ENCOUNTER — TRANSCRIBE ORDERS (OUTPATIENT)
Dept: SLEEP MEDICINE | Facility: HOSPITAL | Age: 45
End: 2020-05-27

## 2020-05-27 DIAGNOSIS — Z01.818 OTHER SPECIFIED PRE-OPERATIVE EXAMINATION: Primary | ICD-10-CM

## 2020-05-29 ENCOUNTER — HOSPITAL ENCOUNTER (OUTPATIENT)
Dept: GENERAL RADIOLOGY | Facility: HOSPITAL | Age: 45
Discharge: HOME | End: 2020-05-29
Attending: INTERNAL MEDICINE

## 2020-05-30 ENCOUNTER — LAB (OUTPATIENT)
Dept: LAB | Facility: HOSPITAL | Age: 45
End: 2020-05-30

## 2020-05-30 DIAGNOSIS — Z01.818 OTHER SPECIFIED PRE-OPERATIVE EXAMINATION: ICD-10-CM

## 2020-05-30 PROCEDURE — U0004 COV-19 TEST NON-CDC HGH THRU: HCPCS

## 2020-06-01 LAB
REF LAB TEST METHOD: NORMAL
SARS-COV-2 RNA RESP QL NAA+PROBE: NOT DETECTED

## 2020-06-02 ENCOUNTER — HOSPITAL ENCOUNTER (OUTPATIENT)
Dept: INFUSION THERAPY | Facility: HOSPITAL | Age: 45
Discharge: HOME | End: 2020-06-02
Admitting: PHYSICAL MEDICINE & REHABILITATION

## 2020-06-02 VITALS
SYSTOLIC BLOOD PRESSURE: 142 MMHG | RESPIRATION RATE: 16 BRPM | HEIGHT: 66 IN | WEIGHT: 245 LBS | TEMPERATURE: 97.8 F | DIASTOLIC BLOOD PRESSURE: 88 MMHG | HEART RATE: 107 BPM | BODY MASS INDEX: 39.37 KG/M2 | OXYGEN SATURATION: 97 %

## 2020-06-02 DIAGNOSIS — M62.838 MUSCLE SPASTICITY: Primary | ICD-10-CM

## 2020-06-02 DIAGNOSIS — G24.9 DYSTONIA: ICD-10-CM

## 2020-06-02 PROCEDURE — 25010000002 ONABOTULINUMTOXINA 100 UNITS RECONSTITUTED SOLUTION: Performed by: PHYSICAL MEDICINE & REHABILITATION

## 2020-06-02 PROCEDURE — 95874 GUIDE NERV DESTR NEEDLE EMG: CPT

## 2020-06-02 RX ORDER — SENNOSIDES 8.6 MG
650 CAPSULE ORAL EVERY 8 HOURS PRN
COMMUNITY
End: 2021-11-10

## 2020-06-02 RX ADMIN — ONABOTULINUMTOXINA 400 UNITS: 100 INJECTION, POWDER, LYOPHILIZED, FOR SOLUTION INTRADERMAL; INTRAMUSCULAR at 15:39

## 2020-06-02 NOTE — OP NOTE
DATE OF PROCEDURE: 06/02/2020        PHYSICIAN: Nghia Alvarez MD - attending      DIAGNOSES:  1. Left upper extremity and left lower extremity spasticity/dystonia.   2. History of meningitis at 18 months of age.   3. History of left arm tendon transfer for finger strength, [facility] in 1988.   4. History of plate, left forearm, 1995,       PROCEDURE PERFORMED: Botulinum toxin injection with electromyographic guidance, left upper extremity and left lower extremity.       HISTORY: The patient is a 45-year-old female with history of meningitis at 18 months of age with weakness and spasticity. She has had increased left-sided tightness in the arm and leg with increased dystonia for the last several years. She is showing a good response to previous Botox injections for dystonia with flexion and extension of the elbow and fingers as well as left foot inversion and toe curling. Her last injections were on 02/19/2018 and 06/05/2018 and 09/18/2018 and 02/19/2019 and 06/04/2019 and 09/17/2019 and 02/02/2020.   She wishes to repeat the injection .              PHYSICAL EXAMINATION: She has significant spasticity spasticity/dystonia in the left upper extremity with elbow flexors greater than elbow extensors as well as finger flexors.   Her left double metal upright AFO with a lateral T-strap. . She has inversion tone with toes curling  .       Risks, benefits, and alternatives were reviewed. Risks include bleeding, infection, excessive weakness, incomplete reduction of her dystonia/spasticity, systemic spread affecting breathing or swallowing muscles, local muscle soreness. Benefits are to decrease her spasticity/dystonia and improve her comfort in position and functional use of the left arm and left leg. Consent was signed and on the chart.       DESCRIPTION OF PROCEDURE: The skin was prepped in sterile fashion. Botulinum toxin type A-Botox at a concentration of 50 units/mL was injected with electromyographic  guidance. Left biceps 25 units, brachialis 25 units, triceps 25 units, brachioradialis 12.5 units , flexor digitorum superficialis 12.5 units, posterior tibialis 125 units, flexure digitorum longus 50 units, flexor hallucis longus 40 units, flexor digitorum brevis 37.5 units, adductor hallucis 25,  , Total 377.5 units injected, wastage 22.5 units,  out of 400 units drawn up for procedure. The patient tolerated the procedure well. No bleeding. Aspiration negative at all sites. Lot [id number] exp 08 2022 time two, [id number] exp 12 2022 times two.    ASSESSMENT AND PLAN: History of left upper extremity and left lower extremity spasticity/dystonia-status post Botox injection today. Continue with her left double metal upright AFO. Monitor skin on plantar foot. Continue Baclofen 20 mg tid.  She will follow up in the office in 6 weeks for reassessment.       Nghia Alvarez MD

## 2020-06-02 NOTE — PATIENT INSTRUCTIONS
OnabotulinumtoxinA injection (Medical Use)  What is this medicine?  ONABOTULINUMTOXINA (o na PHILIP you lye num tox in ) is a neuro-muscular blocker. This medicine is used to treat crossed eyes, eyelid spasms, severe neck muscle spasms, ankle and toe muscle spasms, and elbow, wrist, and finger muscle spasms. It is also used to treat excessive underarm sweating, to prevent chronic migraine headaches, and to treat loss of bladder control due to neurologic conditions such as multiple sclerosis or spinal cord injury.  This medicine may be used for other purposes; ask your health care provider or pharmacist if you have questions.  COMMON BRAND NAME(S): Botox  What should I tell my health care provider before I take this medicine?  They need to know if you have any of these conditions:  · breathing problems  · cerebral palsy spasms  · difficulty urinating  · heart problems  · history of surgery where this medicine is going to be used  · infection at the site where this medicine is going to be used  · myasthenia gravis or other neurologic disease  · nerve or muscle disease  · surgery plans  · take medicines that treat or prevent blood clots  · thyroid problems  · an unusual or allergic reaction to botulinum toxin, albumin, other medicines, foods, dyes, or preservatives  · pregnant or trying to get pregnant  · breast-feeding  How should I use this medicine?  This medicine is for injection into a muscle. It is given by a health care professional in a hospital or clinic setting.  Talk to your pediatrician regarding the use of this medicine in children. While this drug may be prescribed for children as young as 2 years old for selected conditions, precautions do apply.  Overdosage: If you think you have taken too much of this medicine contact a poison control center or emergency room at once.  NOTE: This medicine is only for you. Do not share this medicine with others.  What if I miss a dose?  This does not apply.  What may  interact with this medicine?  · aminoglycoside antibiotics like gentamicin, neomycin, tobramycin  · muscle relaxants  · other botulinum toxin injections  This list may not describe all possible interactions. Give your health care provider a list of all the medicines, herbs, non-prescription drugs, or dietary supplements you use. Also tell them if you smoke, drink alcohol, or use illegal drugs. Some items may interact with your medicine.  What should I watch for while using this medicine?  Visit your doctor for regular check ups.  This medicine will cause weakness in the muscle where it is injected. Tell your doctor if you feel unusually weak in other muscles. Get medical help right away if you have problems with breathing, swallowing, or talking.  This medicine might make your eyelids droop or make you see blurry or double. If you have weak muscles or trouble seeing do not drive a car, use machinery, or do other dangerous activities.  This medicine contains albumin from human blood. It may be possible to pass an infection in this medicine, but no cases have been reported. Talk to your doctor about the risks and benefits of this medicine.  If your activities have been limited by your condition, go back to your regular routine slowly after treatment with this medicine.  What side effects may I notice from receiving this medicine?  Side effects that you should report to your doctor or health care professional as soon as possible:  · allergic reactions like skin rash, itching or hives, swelling of the face, lips, or tongue  · breathing problems  · changes in vision  · chest pain or tightness  · eye irritation, pain  · fast, irregular heartbeat  · infection  · numbness  · speech problems  · swallowing problems  · unusual weakness  Side effects that usually do not require medical attention (report to your doctor or health care professional if they continue or are bothersome):  · bruising or pain at site where  injected  · drooping eyelid  · dry eyes or mouth  · headache  · muscles aches, pains  · sensitivity to light  · tearing  This list may not describe all possible side effects. Call your doctor for medical advice about side effects. You may report side effects to FDA at 6-873-FDJ-7005.  Where should I keep my medicine?  This drug is given in a hospital or clinic and will not be stored at home.  NOTE: This sheet is a summary. It may not cover all possible information. If you have questions about this medicine, talk to your doctor, pharmacist, or health care provider.    FOLLOW UP  The patient has been informed that these injections can have side effects which include rash, flu-like symptoms, dry-mouth, difficulty swallowing, muscle weakness or allergic reaction.  The patient has been instructed that if any of the above symptoms should occur, the patient should go to the nearest emergency room for attention.  Watch for head droop, breathing changes and swallow problems.  Follow up in one month to assess results of today's injection and plan to re-inject in 3 months depending on response.  Call Dr. Nghia Alvarez if you have any questions or concerns.  You can reach Dr. Alvarez at [phone].  Office address [address], [address].   Call the office to make a follow-up appointment in 6 weeks.   © 2020 [employer]/Gold [employer] (2019-06-24 14:21:42)

## 2020-06-02 NOTE — PROGRESS NOTES
The patient has been informed that these injections can have side effects which include rash, flu-like symptoms, dry-mouth, difficulty swallowing, muscle weakness or allergic reaction.  The patient has been instructed that if any of the above symptoms should occur, the patient should go to the nearest emergency room for attention.  Watch for head droop, breathing changes and swallow problems.  Follow up in one month to assess results of today's injection and plan to re-inject in 3 months depending on response.    Call Dr. Nghia Alvarez if you have any questions or concerns.  You can reach Dr. Alvarez at [phone].  Office address [address], [address].        Pt tolerated Botox injections with no difficulties.  AVS given & pt DC per ambulation @ 1603.

## 2020-06-05 ENCOUNTER — TELEMEDICINE CONVERTED (OUTPATIENT)
Dept: INTERNAL MEDICINE | Facility: CLINIC | Age: 45
End: 2020-06-05
Attending: INTERNAL MEDICINE

## 2020-06-08 ENCOUNTER — HOSPITAL ENCOUNTER (OUTPATIENT)
Dept: OTHER | Facility: HOSPITAL | Age: 45
Discharge: HOME | End: 2020-06-08
Attending: INTERNAL MEDICINE

## 2020-06-26 ENCOUNTER — OFFICE VISIT CONVERTED (OUTPATIENT)
Dept: ORTHOPEDIC SURGERY | Facility: CLINIC | Age: 45
End: 2020-06-26
Attending: ORTHOPAEDIC SURGERY

## 2020-07-16 ENCOUNTER — TRANSCRIBE ORDERS (OUTPATIENT)
Dept: ADMINISTRATIVE | Facility: HOSPITAL | Age: 45
End: 2020-07-16

## 2020-07-16 DIAGNOSIS — G24.9 DYSTONIA: Primary | ICD-10-CM

## 2020-09-14 ENCOUNTER — HOSPITAL ENCOUNTER (OUTPATIENT)
Dept: INFUSION THERAPY | Facility: HOSPITAL | Age: 45
Discharge: HOME | End: 2020-09-14
Admitting: PHYSICAL MEDICINE & REHABILITATION

## 2020-09-14 VITALS
OXYGEN SATURATION: 100 % | RESPIRATION RATE: 20 BRPM | TEMPERATURE: 98.6 F | DIASTOLIC BLOOD PRESSURE: 82 MMHG | HEART RATE: 115 BPM | SYSTOLIC BLOOD PRESSURE: 131 MMHG

## 2020-09-14 DIAGNOSIS — G24.9 DYSTONIA: ICD-10-CM

## 2020-09-14 DIAGNOSIS — M62.838 MUSCLE SPASTICITY: Primary | ICD-10-CM

## 2020-09-14 PROCEDURE — 95874 GUIDE NERV DESTR NEEDLE EMG: CPT

## 2020-09-14 PROCEDURE — 25010000002 ONABOTULINUMTOXINA 100 UNITS RECONSTITUTED SOLUTION: Performed by: PHYSICAL MEDICINE & REHABILITATION

## 2020-09-14 RX ADMIN — ONABOTULINUMTOXINA 500 UNITS: 100 INJECTION, POWDER, LYOPHILIZED, FOR SOLUTION INTRADERMAL; INTRAMUSCULAR at 14:57

## 2020-09-14 NOTE — OP NOTE
DATE OF PROCEDURE: 09/14//2020         PHYSICIAN: Nghia Alvarez MD - attending      DIAGNOSES:  1. Left upper extremity and left lower extremity spasticity/dystonia.   2. History of meningitis at 18 months of age.   3. History of left arm tendon transfer for finger strength, [facility] in 1988.   4. History of plate, left forearm, 1995,       PROCEDURE PERFORMED: Botulinum toxin injection with electromyographic guidance, left upper extremity and left lower extremity.       HISTORY: The patient is a 45-year-old female with history of meningitis at 18 months of age with weakness and spasticity. She has had increased left-sided tightness in the arm and leg with increased dystonia for the last several years. She is showing a good response to previous Botox injections for dystonia with flexion and extension of the elbow and fingers as well as left foot inversion and toe curling. Her last injections were on 02/19/2018 and 06/05/2018 and 09/18/2018 and 02/19/2019 and 06/04/2019 and 09/17/2019 and 02/02/2020 and 06/02/2020.   She wishes to repeat the injection .  We dicussed increasing dose slight for finger flexors and also increase dose for toe flexors.             PHYSICAL EXAMINATION: She has significant spasticity spasticity/dystonia in the left upper extremity with elbow flexors greater than elbow extensors as well as finger flexors.   Her left double metal upright AFO with a lateral T-strap. . She has inversion tone with toes curling  .       Risks, benefits, and alternatives were reviewed. Risks include bleeding, infection, excessive weakness, incomplete reduction of her dystonia/spasticity, systemic spread affecting breathing or swallowing muscles, local muscle soreness. Benefits are to decrease her spasticity/dystonia and improve her comfort in position and functional use of the left arm and left leg. Consent was signed and on the chart.       DESCRIPTION OF PROCEDURE: The skin was prepped in  sterile fashion. Botulinum toxin type A-Botox at a concentration of 50 units/mL was injected with electromyographic guidance. Left biceps 25 units, brachialis 25 units, triceps 25 units, brachioradialis 12.5 units , flexor digitorum superficialis 25 units, posterior tibialis 125 units, flexure digitorum longus 75 units, flexor hallucis longus 40 units, flexor digitorum brevis 50 units, adductor hallucis 25,  , Total 427.5 units injected, wastage 72.5 units,  out of 500 units drawn up for procedure. The patient tolerated the procedure well. No bleeding. Aspiration negative at all sites. Lot [id number] exp 02 2023 times five.     ASSESSMENT AND PLAN: History of left upper extremity and left lower extremity spasticity/dystonia-status post Botox injection today. Continue with her left double metal upright AFO. Monitor skin on plantar foot. Continue Baclofen 20 mg tid.  She will follow up in the office in 4 weeks for reassessment.       Nghia Alvarez MD

## 2020-09-14 NOTE — PROGRESS NOTES
The patient has been informed that these injections can have side effects which include rash, flu-like symptoms, dry-mouth, difficulty swallowing, muscle weakness or allergic reaction.  The patient has been instructed that if any of the above symptoms should occur, the patient should go to the nearest emergency room for attention.  Watch for head droop, breathing changes and swallow problems.  Follow up in one month to assess results of today's injection and plan to re-inject in 3 months depending on response.    Call Dr. Nghia Alvarez if you have any questions or concerns.  You can reach   Dr. Alvarez at [phone].  Office address [address], [address].        Tolerated well. AVS given. D/C'd per ambulation.

## 2020-09-14 NOTE — PATIENT INSTRUCTIONS
..The patient has been informed that these injections can have side effects which include rash, flu-like symptoms, dry-mouth, difficulty swallowing, muscle weakness or allergic reaction.  The patient has been instructed that if any of the above symptoms should occur, the patient should go to the nearest emergency room for attention.  Watch for head droop, breathing changes and swallow problems.  Follow up in one month to assess results of today's injection and plan to re-inject in 3 months depending on response.    Call Dr. Nghia Alvarez if you have any questions or concerns.  You can reach   Dr. Alvarez at [phone].  Office address [address], [address].         108

## 2020-09-28 ENCOUNTER — OFFICE VISIT CONVERTED (OUTPATIENT)
Dept: ORTHOPEDIC SURGERY | Facility: CLINIC | Age: 45
End: 2020-09-28
Attending: ORTHOPAEDIC SURGERY

## 2020-10-02 ENCOUNTER — OFFICE VISIT CONVERTED (OUTPATIENT)
Dept: INTERNAL MEDICINE | Facility: CLINIC | Age: 45
End: 2020-10-02
Attending: INTERNAL MEDICINE

## 2020-10-15 ENCOUNTER — HOSPITAL ENCOUNTER (OUTPATIENT)
Dept: MRI IMAGING | Facility: HOSPITAL | Age: 45
Discharge: HOME | End: 2020-10-15
Attending: ORTHOPAEDIC SURGERY

## 2020-10-19 ENCOUNTER — OFFICE VISIT CONVERTED (OUTPATIENT)
Dept: ORTHOPEDIC SURGERY | Facility: CLINIC | Age: 45
End: 2020-10-19
Attending: ORTHOPAEDIC SURGERY

## 2020-10-19 ENCOUNTER — TRANSCRIBE ORDERS (OUTPATIENT)
Dept: ADMINISTRATIVE | Facility: HOSPITAL | Age: 45
End: 2020-10-19

## 2020-10-19 DIAGNOSIS — G24.9 DYSTONIA: Primary | ICD-10-CM

## 2020-12-29 ENCOUNTER — HOSPITAL ENCOUNTER (OUTPATIENT)
Dept: INFUSION THERAPY | Facility: HOSPITAL | Age: 45
Discharge: HOME | End: 2020-12-29

## 2021-02-02 ENCOUNTER — HOSPITAL ENCOUNTER (OUTPATIENT)
Dept: INFUSION THERAPY | Facility: HOSPITAL | Age: 46
Discharge: HOME | End: 2021-02-02
Admitting: PHYSICAL MEDICINE & REHABILITATION

## 2021-02-02 ENCOUNTER — OFFICE VISIT CONVERTED (OUTPATIENT)
Dept: INTERNAL MEDICINE | Facility: CLINIC | Age: 46
End: 2021-02-02
Attending: INTERNAL MEDICINE

## 2021-02-02 VITALS
HEART RATE: 104 BPM | OXYGEN SATURATION: 96 % | RESPIRATION RATE: 20 BRPM | DIASTOLIC BLOOD PRESSURE: 78 MMHG | SYSTOLIC BLOOD PRESSURE: 131 MMHG | TEMPERATURE: 98.6 F

## 2021-02-02 DIAGNOSIS — M62.838 MUSCLE SPASTICITY: Primary | ICD-10-CM

## 2021-02-02 DIAGNOSIS — G24.9 DYSTONIA: ICD-10-CM

## 2021-02-02 PROCEDURE — 95874 GUIDE NERV DESTR NEEDLE EMG: CPT

## 2021-02-02 PROCEDURE — 25010000002 ONABOTULINUMTOXINA 100 UNITS RECONSTITUTED SOLUTION: Performed by: PHYSICAL MEDICINE & REHABILITATION

## 2021-02-02 RX ADMIN — ONABOTULINUMTOXINA 100 UNITS: 100 INJECTION, POWDER, LYOPHILIZED, FOR SOLUTION INTRADERMAL; INTRAMUSCULAR at 15:31

## 2021-02-02 RX ADMIN — ONABOTULINUMTOXINA 100 UNITS: 100 INJECTION, POWDER, LYOPHILIZED, FOR SOLUTION INTRADERMAL; INTRAMUSCULAR at 15:29

## 2021-02-02 RX ADMIN — ONABOTULINUMTOXINA 100 UNITS: 100 INJECTION, POWDER, LYOPHILIZED, FOR SOLUTION INTRADERMAL; INTRAMUSCULAR at 15:30

## 2021-02-02 NOTE — OP NOTE
DATE OF PROCEDURE: 02/02/2021           PHYSICIAN: Nghia Alvarez MD - attending      DIAGNOSES:  1. Left upper extremity and left lower extremity spasticity/dystonia.   2. History of meningitis at 18 months of age.   3. History of left arm tendon transfer for finger strength, [facility] in 1988.   4. History of plate, left forearm, 1995,       PROCEDURE PERFORMED: Botulinum toxin injection with electromyographic guidance, left upper extremity and left lower extremity.       HISTORY: The patient is a 45-year-old female with history of meningitis at 18 months of age with weakness and spasticity. She has had increased left-sided tightness in the arm and leg with increased dystonia for the last several years. She is showing a good response to previous Botox injections for dystonia with flexion and extension of the elbow and fingers as well as left foot inversion and toe curling. Her last injections were on 02/19/2018 and 06/05/2018 and 09/18/2018 and 02/19/2019 and 06/04/2019 and 09/17/2019 and 02/02/2020 and 06/02/2020 and 9/14/2020.   She wishes to repeat the injection but decrease the dose as she still has some residual benefit from the last injection in September.  We discussed decreasing dose to the triceps and finger flexors and posterior tibialis but leave the dose the same at the elbow flexors and toe flexors.               PHYSICAL EXAMINATION: She has significant spasticity spasticity/dystonia in the left upper extremity with elbow flexors greater than elbow extensors as well as finger flexors.   Her left double metal upright AFO with a lateral T-strap. . She has less inversion tone, but still significant with toes curling  .       Risks, benefits, and alternatives were reviewed. Risks include bleeding, infection, excessive weakness, incomplete reduction of her dystonia/spasticity, systemic spread affecting breathing or swallowing muscles, local muscle soreness. Benefits are to decrease her  spasticity/dystonia and improve her comfort in position and functional use of the left arm and left leg. Consent was signed and on the chart.       DESCRIPTION OF PROCEDURE: The skin was prepped in sterile fashion. Botulinum toxin type A-Botox at a concentration of 50 units/mL was injected with electromyographic guidance. Left biceps 25 units, brachialis 25 units, triceps 12.5 units, brachioradialis 12.5 units , flexor digitorum superficialis 12.5 units, posterior tibialis 75 units, flexure digitorum longus 75 units, flexor hallucis longus 40 units, flexor digitorum brevis 50 units, adductor hallucis 25,  , Total 352.5 units injected, wastage 47.5 units,  out of 400 units drawn up for procedure. The patient tolerated the procedure well. No bleeding. Aspiration negative at all sites. Lot [id number] exp 10 2023 times three and [id number] exp 09 2023.     ASSESSMENT AND PLAN: History of left upper extremity and left lower extremity spasticity/dystonia-status post Botox injection today. Continue with her left double metal upright AFO. Monitor skin on plantar foot. Continue Baclofen 20 mg tid.  She will follow up in the office in 4 weeks for reassessment.       Nghia Alvarez MD

## 2021-02-03 ENCOUNTER — OFFICE VISIT CONVERTED (OUTPATIENT)
Dept: ORTHOPEDIC SURGERY | Facility: CLINIC | Age: 46
End: 2021-02-03
Attending: ORTHOPAEDIC SURGERY

## 2021-04-29 ENCOUNTER — HOSPITAL ENCOUNTER (OUTPATIENT)
Dept: MAMMOGRAPHY | Facility: HOSPITAL | Age: 46
Discharge: HOME | End: 2021-04-29
Attending: INTERNAL MEDICINE

## 2021-05-12 NOTE — PROGRESS NOTES
Progress Note      Patient Name: Mallory Mata   Patient ID: [id number]   Sex: Female   YOB: 1975    Primary Care Provider: Tiffany Salomon MD   Referring Provider: Sb Carrasquillo MD    Visit Date: April 8, 2020    Provider: Sb Carrasquillo MD   Location: [facility]   Location Address: [address] [place] [address]  [id number]   Location Phone: [phone]          Chief Complaint  · Right Shoulder Pain      History Of Present Illness  Mallory Mata is a 44 year old /Black female who presents today to [facility].      She's following up for right shoulder pain. Patient complains of right shoulder pain, weakness, and numbness. Patient received a right shoulder steroid injection in May 2019 that provided relief of pain. Patient states history of physical therapy that improved right shoulder strength. Patient also has complaints of right thumb triggering today. Patient had meningitis at 18 months old that affected the left side.             Past Medical History  Allergic rhinitis; Anxiety; Contracture of muscle of multiple sites; Depression; Heart Murmur; High blood pressure; Hypertension; Left Hip Trochanteric Bursitis; Meningitis; Migraine Headaches; Recurrent major depressive disorder, in full remission; Rheumatic Fever; Tendinitis, hip, Left         Past Surgical History  Broken Bones; Metal implants         Medication List  azelastine 137 mcg (0.1 %) nasal aerosol,spray; baclofen 20 mg oral tablet; buspirone 15 mg oral tablet; Chantix Continuing Month Box 1 mg oral tablet; gabapentin 300 mg oral capsule; Multiple Vitamins oral tablet; Remeron 15 mg oral tablet; Rexulti 0.5 mg oral tablet; Singulair 10 mg oral tablet; Trintellix 10 mg oral tablet; Tylenol Arthritis Pain 650 mg oral tablet extended release         Allergy List  NO KNOWN DRUG ALLERGIES         Family Medical History  Stroke; Heart Disease; Cancer, Unspecified; Diabetes, unspecified type; Diabetes  "mellitus, type II; Prostate cancer         Reproductive History   1 Para 1 0 0 0       Social History  Alcohol Use (Never); Claustophobic (Unknown); lives with children; lives with spouse; .; Recreational Drug Use (Former); Student.; Tobacco (Former); Unemployed.         Immunizations  Name Date Admin   Influenza    Influenza    [id number]    Prevnar 13          Review of Systems  · Constitutional  o Denies  o : fever, chills, weight loss  · Cardiovascular  o Denies  o : chest pain, shortness of breath  · Gastrointestinal  o Denies  o : liver disease, heartburn, nausea, blood in stools  · Genitourinary  o Denies  o : painful urination, blood in urine  · Integument  o Denies  o : rash, itching  · Neurologic  o Denies  o : headache, weakness, loss of consciousness  · Musculoskeletal  o Denies  o : painful, swollen joints  · Psychiatric  o Admits  o : anxiety, depression  o Denies  o : drug/alcohol addiction      Vitals  Date Time BP Position Site L\R Cuff Size HR RR TEMP (F) WT  HT  BMI kg/m2 BSA m2 O2 Sat        2020 08:12 AM         214lbs 16oz 5'  6\" 34.7 2.13           Physical Examination  · Constitutional  o Appearance  o : well developed, well-nourished, no obvious deformities present  · Head and Face  o Head  o :   § Inspection  § : normocephalic  o Face  o :   § Inspection  § : no facial lesions  · Eyes  o Conjunctivae  o : conjunctivae normal  o Sclerae  o : sclerae white  · Ears, Nose, Mouth and Throat  o Ears  o :   § External Ears  § : appearance within normal limits  § Hearing  § : intact  o Nose  o :   § External Nose  § : appearance normal  · Neck  o Inspection/Palpation  o : normal appearance  o Range of Motion  o : full range of motion  · Respiratory  o Respiratory Effort  o : breathing unlabored  o Inspection of Chest  o : normal appearance  o Auscultation of Lungs  o : no audible wheezing or rales  · Cardiovascular  o Heart  o : regular rate  · Gastrointestinal  o Abdominal " Examination  o : soft and non-tender  · Skin and Subcutaneous Tissue  o General Inspection  o : intact, no rashes  · Psychiatric  o General  o : Alert and oriented x3  o Judgement and Insight  o : judgment and insight intact  o Mood and Affect  o : mood normal, affect appropriate  · Right Shoulder  o Inspection  o : Normal appearing right shoulder. No skin discoloration, atrophy, or swelling. Full passive shoulder range of motion with pain. Decreased rotator cuff strength. Positive empty can test. Good tone of deltoid, biceps, triceps, wrist extensors, and wrist flexors. Neurovascularly intact. Sensation grossly intact.   · Right Hand  o Inspection  o : No skin discoloration, atrophy, or swelling. Positive triggering of thumb. Tender A1 pulley of thumb. Brisk capillary refill to digits. Negative Tinel's. Negative Ferrari's. Neurovascularly intact. Sensation grossly intact. 2+ radial and ulnar pulses.   · In Office Procedures  o View  o : AP/LATERAL  o Site  o : right,scapula  o Indication  o : Right Shoulder Pain  o Study  o : X-rays ordered, taken in the office, and reviewed today.  o Xray  o : Good joint space; Negative for fracture or dislocation.   o Comparative Data  o : No comparative data found          Assessment  · Right shoulder pain, unspecified chronicity     719.41/M25.511  · Tear of right rotator cuff     840.4/M75.101  · Trigger finger of right thumb     727.03/M65.311      Plan  · Orders  o Scapula (Right) [facility] Preferred View ([id number]) - 719.41/M25.511 - 04/08/2020  · Medications  o Medications have been Reconciled  o Transition of Care or Provider Policy  · Instructions  o Reviewed the patient's Past Medical, Social, and Family history as well as the ROS at today's visit, no changes.  o Call or return if worsening symptoms.  o The above service was scribed by Donna Moe on my behalf and I attest to the accuracy of the note. j luis  o For right hand, discussed conservative management versus surgical  intervention. Patient wishes to have right shoulder evaluated first since it's the most bothersome. Dr. Carrasquillo told patient that right shoulder arthroscopy would limit use of upper extremities because of history of meningitis that affected left side. The plan is a right shoulder MRI to check the rotator cuff. Follow-up after MRI.            Electronically Signed by: Merle Moe - , Other -Author on April 9, 2020 08:46:50 AM  Electronically Co-signed by: Sb Carrasquillo MD -Reviewer on April 10, 2020 04:58:35 PM

## 2021-05-13 NOTE — PROGRESS NOTES
"   Progress Note      Patient Name: Mallory Mata   Patient ID: [id number]   Sex: Female   YOB: 1975    Primary Care Provider: Tiffany Salomon MD   Referring Provider: Tiffany Salomon MD    Visit Date: October 2, 2020    Provider: Tiffany Salomon MD   Location: [facility]   Location Address: [address] [place] [address]  [id number]   Location Phone: [phone]          Chief Complaint  · \"I am just following up today\"  · \"I need some refills\"  · \"My orthopedic doctor thinks I may have some dead nerves in my neck\"      History Of Present Illness  Mallory Mata is a 45 year old /Black female who presents for evaluation and treatment of:      getting an MRI of her neck on 10/15  they are going to see if that is contributing to changes in her arm  they aren't going to do PT until the MRI is back    No chest pain  No trouble breathing  No leg swelling    Continues to work with PM&R doctors for her contractions    Like the Chantix is helping her to quit smoking somewhat    Does think the Topamax helps with weight loss as well as some headaches       Allergy List    Allergies Reconciled  Review of Systems  · Constitutional  o Denies  o : fever, fatigue, weight loss, weight gain  · Cardiovascular  o Denies  o : lower extremity edema, claudication, chest pressure, palpitations  · Respiratory  o Denies  o : shortness of breath, wheezing, frequent cough, hemoptysis, dyspnea on exertion  · Gastrointestinal  o Denies  o : nausea, vomiting, diarrhea, constipation, abdominal pain      Vitals  Date Time BP Position Site L\R Cuff Size HR RR TEMP (F) WT  HT  BMI kg/m2 BSA m2 O2 Sat FR L/min FiO2 HC       04/08/2020 08:12 AM         214lbs 16oz 5'  6\" 34.7 2.13       06/26/2020 02:56 PM      78 - R   236lbs 0oz 5'  6\" 38.09 2.23 97 %      09/28/2020 03:47 PM      88 - R   208lbs 0oz 5'  6\" 33.57 2.1 98 %      10/02/2020 10:41 /88 Sitting    117 - R  97.6 203lbs " "16oz 5'  6\" 32.93 2.08 98 %  21%          Physical Examination  · Constitutional  o Appearance  o : no acute distress, well-nourished  · Head and Face  o Head  o :   § Inspection  § : atraumatic, normocephalic  · Eyes  o Eyes  o : extraocular movements intact, no scleral icterus, no conjunctival injection  · Respiratory  o Respiratory Effort  o : breathing comfortably, symmetric chest rise  o Auscultation of Lungs  o : clear to asculatation bilaterally, no wheezes, rales, or rhonchii  · Cardiovascular  o Heart  o :   § Auscultation of Heart  § : regular rate and rhythm, no murmurs, rubs, or gallops  o Peripheral Vascular System  o :   § Extremities  § : no edema  · Neurologic  o Mental Status Examination  o :   § Orientation  § : grossly oriented to person, place and time  o Gait and Station  o :   § Gait Screening  § : Limping gait due to contractures  · Psychiatric  o General  o : normal mood and affect          Results  · In-Office Procedures  o Lab procedure  § IOP - Urine Drug Screen In-House [facility] (29680)   § Amphetamines Ur Ql: Negative   § Barbiturates Ur Ql: Negative   § Buprenorphine+Nor Ur Ql Scn: Negative   § Benzodiaz Ur Ql: Negative   § Cocaine Ur Ql: Negative   § Methadone Ur Ql: Negative   § Methamphet Ur Ql: Negative   § MDMA Ur Ql Scn: Negative   § Opiates Ur Ql: Positive   § Oxycodone Ur Ql: Negative   § PCP Ur Ql: Negative   § THC Ur Ql: Positive   § Temp in Range?: Within/Acceptable   § Control Seen?: Yes       Assessment  · Contracture of muscle of multiple sites     728.85/M62.49  will cont baclofen and botox  · Recurrent major depressive disorder, in full remission     296.36/F33.42  doing well on trintellix  says it keeps her smiling :)  · Need for influenza vaccination     V04.81/Z23  · Visit for screening mammogram     V76.12/Z12.31  · Headache     784.0/R51  Topamax  · Tobacco abuse counseling       Tobacco abuse counseling     V65.42/Z71.6  Refill Chantix     Chronic issues stable continue " current meds  Await MRI     Problems Reconciled  Plan  · Orders  o Screening Mammography; Bilateral 2D (53022, ) - V76.12/Z12.31 - 10/02/2020  o ACO-17: Screened for tobacco use AND received tobacco cessation intervention (4004F) - V65.42/Z71.6 - 10/02/2020  o ACO-39: Current medications updated and reviewed (1159F, ) - - 10/02/2020  o IM/SQ - Injection Fee [facility] (43007) - - 10/02/2020  o Fluzone Quadrivalent Vaccine, age 6 months + (73163) - V04.81/Z23 - 10/02/2020  · Medications  o Topamax 25 mg oral tablet   SIG: take 1 tablet (25 mg) by oral route BID   DISP: (60) Tablet with 0 refills  Prescribed on 10/02/2020     o Chantix Continuing Month Box 1 mg oral tablet   SIG: take 1 tablet (1 mg) with glass of water by oral route 2 times per day after meals   DISP: (60) Tablet with 2 refills  Prescribed on 10/02/2020     o naproxen 500 mg oral tablet   SIG: take 1 tablet (500 mg) by oral route 2 times per day with food   DISP: (180) Tablet with 0 refills  Prescribed on 10/02/2020     o baclofen 20 mg oral tablet   SIG: TAKE 1 TABLET BY MOUTH THREE TIMES DAILY   DISP: (90) Tablet with 1 refills  Refilled on 10/02/2020     o Singulair 10 mg oral tablet   SIG: take 1 tablet (10 mg) by oral route once daily in the evening for 90 days   DISP: (90) Tablet with 1 refills  Refilled on 10/02/2020     o Chantix Starting Month Box 0.5 mg (11)- 1 mg (42) oral tablets,dose pack   SIG: take as directed   DISP: (1) 53 ct dose-pack with 0 refills  Discontinued on 10/02/2020     o Medications have been Reconciled  o Transition of Care or Provider Policy  · Instructions  o Tobacco and smoking cessation counseling for more than 3 minutes was completed.  o Patient was educated/instructed on their diagnosis, treatment and medications prior to discharge from the clinic today.  · Disposition  o 4 Month Follow Up            Electronically Signed by: Tiffany Salomon MD -Author on October 29, 2020 09:44:37 PM

## 2021-05-13 NOTE — PROGRESS NOTES
Progress Note      Patient Name: Mallory Mata   Patient ID: [id number]   Sex: Female   YOB: 1975    Primary Care Provider: Tiffany Salomon MD    Visit Date: October 19, 2020    Provider: Sb Carrasquillo MD   Location: [facility]   Location Address: [address] [place] [address]  [id number]   Location Phone: [phone]          Chief Complaint  · Cervical Pain-MRI Results      History Of Present Illness  Mallory Mata is a 45 year old /Black female who presents today to [facility].      Patient presents today with a follow-up of cervical spine pain. Patient has been experiencing sharp pain from her neck that radiates down to her fingers. Patient is having limited ROM of her neck and decreased ROM of shoulder. Patient states that she had a crook in her neck when she woke up one morning causing her shoulder pain. Patient states that she woke up this way. Patient presents today with MRI results. Patient states that her fingers aren't as numb as they were to her prior visit. Patient states she still has a lot of weakness in her arm and her neck pain is still the same, if not worse. Patient states that pain and weakness started a month ago.                   Past Medical History  Allergic rhinitis; Anxiety; Contracture of muscle of multiple sites; Depression; Heart Murmur; High blood pressure; Hypertension; Left Hip Trochanteric Bursitis; Meningitis; Migraine Headaches; Recurrent major depressive disorder, in full remission; Rheumatic Fever; Tendinitis, hip, Left         Past Surgical History  Broken Bones; Metal implants         Medication List  azelastine 137 mcg (0.1 %) nasal aerosol,spray; baclofen 20 mg oral tablet; buspirone 15 mg oral tablet; Chantix Continuing Month Box 1 mg oral tablet; gabapentin 300 mg oral capsule; naproxen 500 mg oral tablet; Norco 7.5-325 mg oral tablet; Rexulti 0.5 mg oral tablet; Singulair 10 mg oral tablet; Topamax 25 mg oral  "tablet; Trintellix 10 mg oral tablet         Allergy List  NO KNOWN DRUG ALLERGIES       Allergies Reconciled  Family Medical History  Stroke; Heart Disease; Cancer, Unspecified; Diabetes, unspecified type; Diabetes Mellitus, Type II; Prostate cancer         Reproductive History   1 Para 1 0 0 0       Social History  Alcohol Use (Never); Claustophobic (Unknown); lives with children; lives with spouse; .; Recreational Drug Use (Former); Student.; Tobacco (Former); Unemployed.         Immunizations  Name Date Admin   Influenza 10/02/2020   Influenza 2019   Influenza 2017   Nzzmncsvd30 2016   Prevnar 13 2018         Review of Systems  · Constitutional  o Denies  o : fever, chills, weight loss  · Cardiovascular  o Denies  o : chest pain, shortness of breath  · Gastrointestinal  o Denies  o : liver disease, heartburn, nausea, blood in stools  · Genitourinary  o Denies  o : painful urination, blood in urine  · Integument  o Denies  o : rash, itching  · Neurologic  o Denies  o : headache, weakness, loss of consciousness  · Musculoskeletal  o Denies  o : painful, swollen joints  · Psychiatric  o Denies  o : drug/alcohol addiction, anxiety, depression      Vitals  Date Time BP Position Site L\R Cuff Size HR RR TEMP (F) WT  HT  BMI kg/m2 BSA m2 O2 Sat FR L/min FiO2        10/19/2020 10:19 AM         203lbs 16oz 5'  6\" 32.93 2.08             Physical Examination  · Constitutional  o Appearance  o : well developed, well-nourished, no obvious deformities present  · Head and Face  o Head  o :   § Inspection  § : normocephalic  o Face  o :   § Inspection  § : no facial lesions  · Eyes  o Conjunctivae  o : conjunctivae normal  o Sclerae  o : sclerae white  · Ears, Nose, Mouth and Throat  o Ears  o :   § External Ears  § : appearance within normal limits  § Hearing  § : intact  o Nose  o :   § External Nose  § : appearance normal  · Neck  o Inspection/Palpation  o : normal appearance  o Range " of Motion  o : full range of motion  · Respiratory  o Respiratory Effort  o : breathing unlabored  o Inspection of Chest  o : normal appearance  o Auscultation of Lungs  o : no audible wheezing or rales  · Cardiovascular  o Heart  o : regular rate  · Gastrointestinal  o Abdominal Examination  o : soft and non-tender  · Skin and Subcutaneous Tissue  o General Inspection  o : intact, no rashes  · Psychiatric  o General  o : Alert and oriented x3  o Judgement and Insight  o : judgment and insight intact  o Mood and Affect  o : mood normal, affect appropriate  · Cervical Spine  o Inspection  o : Sensation grossly intact. Neurovascular intact. Pulses normal. No skin discoloration or swelling. Skin intact. Tender to palpation of shoulder, deltoid, biceps, and neck. Strength 3 out of 5. Mild atrophy. Pain with drop arm and empty can testing. Good flexion and extension with elbow and wrist, weakness present.   · Imaging  o Imaging  o : [MRI] 1. Mild multilevel degenerative disc disease change. 2. No focal disc protrusions or free disc fragments are evident. 3. There is mild neural foraminal narrowing but there is no spinal canal stenosis. There is an incidental left paracentral protrusion at T1-T2.           Assessment  · Neck pain     723.1/M54.2  · Cervical spine pain     723.1/M54.2      Plan  · Medications  o Medications have been Reconciled  o Transition of Care or Provider Policy  · Instructions  o Dr. Carrasquillo saw and examined the patient and agrees with plan.   o X-rays reviewed by Dr. Carrasquillo.  o Reviewed the patient's Past Medical, Social, and Family history as well as the ROS at today's visit, no changes.  o Call or return if worsening symptoms.  o Follow Up PRN.  o This note was transcribed by Kylie Hanley. j luis valdes Discussed diagnosis and treatment options with the patient. Discussed be referred to a specialist for her spine and PT. Patient opted for PT.            Electronically Signed by: Kylie  Dayan-, Other -Author on October 19, 2020 12:14:32 PM  Electronically Co-signed by: Sb Carrasquillo MD -Reviewer on October 21, 2020 04:49:57 PM

## 2021-05-13 NOTE — PROGRESS NOTES
"   Progress Note      Patient Name: Mallory Mata   Patient ID: [id number]   Sex: Female   YOB: 1975    Primary Care Provider: Tiffany Salomon MD    Visit Date: June 5, 2020    Provider: Tiffany Salomon MD   Location: [facility]   Location Address: [address] [place] [address], [address]  [place], [place]  [id number]   Location Phone: [phone]          Chief Complaint  · \"im doing a follow up today\"  · \"i want to talk to her about my thyroid\"  · \"i dont have a appetite at all\"  · \"i hav missed my period\"      History Of Present Illness  Video Conferencing Visit  Mallory Mata is a 45 year old /Black female who is presenting for evaluation via video conferencing via zoom. Verbal consent obtained before beginning visit.   The following staff were present during this visit: Tiffany Salomon MD   Mallory Mata is a 45 year old /Black female who presents for evaluation and treatment of:      states that she has gained to about 245lbs  she has no appetitie  she has been having diarrhea for the last 2 weeks as well  her son and her  have both had diarrhea as well    late for her period by 2 weeks    still getting botox injections for her arm    no fever  no chest pain  breathing well    states that she hasn't been as active as normal because her brace is broken right now    she has noticed some swelling in her feet and her ankles  it comes and goes    stress is about a 7  hasn't seen her psychiatrist since covid  they are trying to get her in again  she is still taking buspar and trintellix  she states that the remeron helps her stay calm  states that she has been getting up at night and eating                 Past Medical History  Disease Name Date Onset Notes   Allergic rhinitis --  --    Anxiety --  --    Contracture of muscle of multiple sites 04/18/2016 will cont baclofen and botox   Depression --  --    Heart Murmur --  --    High blood pressure --  --  "   Hypertension --  --    Left Hip Trochanteric Bursitis 10/05/2017 --    Meningitis 01/08/2016 this occurred as a child, but she has a signfiicant amount of contractures on her left side will schedule physical therapy refer to PM&R start baclofen   Migraine Headaches --  --    Recurrent major depressive disorder, in full remission 09/11/2017 doing well on trintellix says it keeps her smiling :)   Rheumatic Fever --  --    Tendinitis, hip, Left 10/05/2017 --          Past Surgical History  Procedure Name Date Notes   Broken Bones 1988 & 1995 Left Arm   Metal implants --  --          Medication List  Name Date Started Instructions   azelastine 137 mcg (0.1 %) nasal aerosol,spray 03/12/2020 spray 2 sprays in each nostril by intranasal route 2 times per day   baclofen 20 mg oral tablet 04/21/2020 TAKE 1 TABLET BY MOUTH THREE TIMES DAILY   buspirone 15 mg oral tablet 01/06/2020 take 0.5 tablet (7.5 mg) by oral route 3 times per day   gabapentin 300 mg oral capsule 04/06/2020 TAKE ONE CAPSULE BY MOUTH 1 TO 3 TIMES DAILY AS NEEDED   Remeron 15 mg oral tablet 02/25/2019 take 1 tablet (15 mg) by oral route once daily before bedtime   Rexulti 0.5 mg oral tablet  take 1 tablet by oral route at bedtime   Singulair 10 mg oral tablet 12/30/2019 take 1 tablet (10 mg) by oral route once daily in the evening for 90 days   Trintellix 10 mg oral tablet 03/26/2020 take 1 tablet (10 mg) by oral route once daily at the same time each day   Tylenol Arthritis Pain 650 mg oral tablet extended release 04/03/2020 Take 1 tablet by mouth twice a day         Allergy List  Allergen Name Date Reaction Notes   NO KNOWN DRUG ALLERGIES --  --  --        Allergies Reconciled  Family Medical History  Disease Name Relative/Age Notes   Stroke  --    Heart Disease  --    Cancer, Unspecified Father/   Father   Diabetes, unspecified type Mother/   Mother   Diabetes mellitus, type II  --    Prostate cancer Father/   --          Reproductive  History  Menstrual   Last Menstrual Period: 2013 Method of Birth Control: None   Pregnancy Summary   Total Pregnancies: 1 Full Term: 1 Premature: 0   Ab Induced: 0 Ab Spontaneous: 0 Ectopics: 0   Multiples: 0 Livin         Social History  Finding Status Start/Stop Quantity Notes   Alcohol Use Never --/-- --  does not drink   Claustophobic Unknown --/-- --  yes   lives with children --  --/-- --  --    lives with spouse --  --/-- --  --    . --  --/-- --  --    Recreational Drug Use Former --/-- --  in the past  in the past  no   Student. --  --/-- --  --    Tobacco Former --/-- --  former smoker  current every day smoker, 0.5 pack per day, smoked 6-10 years  former smoker   Unemployed. --  --/-- --  --          Immunizations  NameDate Admin Mfg Trade Name Lot Number Route Inj VIS Given VIS Publication   Ccywpoxvu58/12/2019 [facility] Fluzone Quadrivalent [id number]  RD 2019    Comments: pt tolerated well and left office in stable condition, [facility]   Dxjdnnyhi72/11/2017 [facility] Fluarix, quadrivalent, preservative free [id number]  RD 2017   Comments: pt tolerated well and left office in stable condition, [facility]   Vommyedcl3099/21/2016 [facility] PNEUMOVAX 23 [id number]  RD 2016   Comments: pt tolerated well and left office in stable condition, [facility]il   Prevnar 1302018 [facility] PREVNAR 13 [id number]  RD 2018   Comments: Pt tolerated well and left office in stable condition         Review of Systems  · Constitutional  o Denies  o : fever, fatigue, weight loss, weight gain  · Cardiovascular  o Denies  o : lower extremity edema, claudication, chest pressure, palpitations  · Respiratory  o Denies  o : shortness of breath, wheezing, cough, hemoptysis, dyspnea on exertion  · Gastrointestinal  o Denies  o : nausea, vomiting, diarrhea, constipation, abdominal pain      Physical Examination     Gen: well-nourished, no acute distress  HENT: atraumatic,  normocephalic  Eyes: extraocular movements intact, no scleral icterus  Lung: breathing comfortably, no cough  Skin: no visible rash, no lesions  Neuro: grossly oriented to person, place, and time. no facial droop   Psych: normal mood and affect               Assessment  · Diarrhea     787.91/R19.7  likely viral  cont to monitor  · Hypothyroidism     244.9/E03.9  will check labs  · Secondary amenorrhea     626.0/N91.1  will check labs  hopefully just due to rapid weight gain     weight gain likely contributing to problems  I think the remeron is the reason for the weight gain especially since she is getting up at night and eating  stop remeron  could consider restart of topamax as she stopped that at the same time as she started the remeron    will check labs  adjust meds as needed based on results    otherwise doing well     Problems Reconciled  Plan  · Orders  o CBC with Auto Diff [facility] (46200) - 787.91/R19.7 - 06/05/2020  o CMP [facility] (19485) - 787.91/R19.7 - 06/05/2020  o Thyroid Profile (52243, 06991, THYII) - 244.9/E03.9 - 06/05/2020  o ACO-39: Current medications updated and reviewed () - - 06/05/2020  o Prolactin Level (Serum) (62592) - - 06/05/2020  o Beta HCG Quantitative Pregnancy (97111) - - 06/05/2020  · Medications  o Medications have been Reconciled  o Transition of Care or Provider Policy  · Instructions  o Patient was educated/instructed on their diagnosis, treatment and medications prior to discharge from the clinic today.            Electronically Signed by: Tiffany Salomon MD -Author on June 5, 2020 10:57:55 AM

## 2021-05-13 NOTE — PROGRESS NOTES
Progress Note      Patient Name: Mallory Mata   Patient ID: [id number]   Sex: Female   YOB: 1975    Primary Care Provider: Tiffany Salomon MD    Visit Date: September 28, 2020    Provider: Sb Carrasquillo MD   Location: [facility]   Location Address: [address] [place] [address]  [id number]   Location Phone: [phone]          Chief Complaint  · Right Shoulder Pain      History Of Present Illness  Mallory Mata is a 45 year old /Black female who presents today to [facility].      Patient presents today with a follow-up of right shoulder pain. Patient states that so far she has not done PT. Patient states that she had a crook in her neck causing her shoulder pain to worsen. Patient states that she woke up this way. Patient states that her thumb, pointer finger and middle finger on her right side has gone numb. Patient states that the sharp pain from her neck down her fingers. Patient is having limited ROM of her neck and decreased ROM of shoulder. Patient went to the ER and they gave her prednisone.                  Past Medical History  Allergic rhinitis; Anxiety; Contracture of muscle of multiple sites; Depression; Heart Murmur; High blood pressure; Hypertension; Left Hip Trochanteric Bursitis; Meningitis; Migraine Headaches; Recurrent major depressive disorder, in full remission; Rheumatic Fever; Tendinitis, hip, Left         Past Surgical History  Broken Bones; Metal implants         Medication List  azelastine 137 mcg (0.1 %) nasal aerosol,spray; baclofen 20 mg oral tablet; buspirone 15 mg oral tablet; Chantix Starting Month Box 0.5 mg (11)- 1 mg (42) oral tablets,dose pack; gabapentin 300 mg oral capsule; Norco 7.5-325 mg oral tablet; Remeron 15 mg oral tablet; Rexulti 0.5 mg oral tablet; Singulair 10 mg oral tablet; Trintellix 10 mg oral tablet; Tylenol Arthritis Pain 650 mg oral tablet extended release         Allergy List  NO KNOWN DRUG ALLERGIES  "      Allergies Reconciled  Family Medical History  Stroke; Heart Disease; Cancer, Unspecified; Diabetes, unspecified type; Diabetes Mellitus, Type II; Prostate cancer         Reproductive History   1 Para 1 0 0 0       Social History  Alcohol Use (Never); Claustophobic (Unknown); lives with children; lives with spouse; .; Recreational Drug Use (Former); Student.; Tobacco (Former); Unemployed.         Immunizations  Name Date Admin   Influenza 2019   Influenza 2017   Rqybdprdo95 2016   Prevnar 13 2018         Review of Systems  · Constitutional  o Denies  o : fever, chills, weight loss  · Cardiovascular  o Denies  o : chest pain, shortness of breath  · Gastrointestinal  o Denies  o : liver disease, heartburn, nausea, blood in stools  · Genitourinary  o Denies  o : painful urination, blood in urine  · Integument  o Denies  o : rash, itching  · Neurologic  o Denies  o : headache, weakness, loss of consciousness  · Musculoskeletal  o Denies  o : painful, swollen joints  · Psychiatric  o Denies  o : drug/alcohol addiction, anxiety, depression      Vitals  Date Time BP Position Site L\R Cuff Size HR RR TEMP (F) WT  HT  BMI kg/m2 BSA m2 O2 Sat FR L/min FiO2        2020 03:47 PM      88 - R   208lbs 0oz 5'  6\" 33.57 2.1 98 %            Physical Examination  · Constitutional  o Appearance  o : well developed, well-nourished, no obvious deformities present  · Head and Face  o Head  o :   § Inspection  § : normocephalic  o Face  o :   § Inspection  § : no facial lesions  · Eyes  o Conjunctivae  o : conjunctivae normal  o Sclerae  o : sclerae white  · Ears, Nose, Mouth and Throat  o Ears  o :   § External Ears  § : appearance within normal limits  § Hearing  § : intact  o Nose  o :   § External Nose  § : appearance normal  · Neck  o Inspection/Palpation  o : normal appearance  o Range of Motion  o : full range of motion  · Respiratory  o Respiratory Effort  o : breathing " unlabored  o Inspection of Chest  o : normal appearance  o Auscultation of Lungs  o : no audible wheezing or rales  · Cardiovascular  o Heart  o : regular rate  · Gastrointestinal  o Abdominal Examination  o : soft and non-tender  · Skin and Subcutaneous Tissue  o General Inspection  o : intact, no rashes  · Psychiatric  o General  o : Alert and oriented x3  o Judgement and Insight  o : judgment and insight intact  o Mood and Affect  o : mood normal, affect appropriate  · Right Shoulder  o Inspection  o : Sensation grossly intact. Neurovascular intact. Pulses normal. Decreased ROM of shoulder. Tender to palpation of shoulder, deltoid, biceps, and neck. No swelling, skin discoloration or atrophy. Skin intact. Strength 3 out of 5. Pain with drop arm and empty can testing.  · Imaging  o Imaging  o : [XRAY] No signs of fracture or dislocation.           Assessment  · Right shoulder pain, unspecified chronicity     719.41/M25.511  · Cervical spine pain     723.1/M54.2      Plan  · Medications  o Medications have been Reconciled  o Transition of Care or Provider Policy  · Instructions  o Dr. Carrasquillo saw and examined the patient and agrees with plan.   o X-rays reviewed by Dr. Carrasquillo.  o Reviewed the patient's Past Medical, Social, and Family history as well as the ROS at today's visit, no changes.  o Call or return if worsening symptoms.  o Follow up after MRI.  o This note was transcribed by Kylie Hanley. j [email or web address]  o Discussed diagnosis and treatment options with the patient. MRI of her neck and will follow-up after getting results.             Electronically Signed by: Kylie Hanley-, Other -Author on October 1, 2020 09:56:27 AM  Electronically Co-signed by: Sb Carrasquillo MD -Reviewer on October 2, 2020 08:18:09 PM

## 2021-05-13 NOTE — PROGRESS NOTES
Progress Note      Patient Name: Mallory Mata   Patient ID: [id number]   Sex: Female   YOB: 1975    Primary Care Provider: Tiffany Salomon MD    Visit Date: 2020    Provider: Sb Carrasquillo MD   Location: [facility]   Location Address: [address] [place] [address]  [id number]   Location Phone: [phone]          Chief Complaint  · Follow up right shoulder pain and MRI results.       History Of Present Illness  Mallory Mata is a 45 year old /Black female who is following up for her right shoulder. The MRI reports shows small partial tearing of the rotator cuff and impingement. She has made a lot of improvement in therapy and got a lot of motion back since the initial time I saw her.       Past Medical History  Allergic rhinitis; Anxiety; Contracture of muscle of multiple sites; Depression; Heart Murmur; High blood pressure; Hypertension; Left Hip Trochanteric Bursitis; Meningitis; Migraine Headaches; Recurrent major depressive disorder, in full remission; Rheumatic Fever; Tendinitis, hip, Left         Past Surgical History  Broken Bones; Metal implants         Medication List  azelastine 137 mcg (0.1 %) nasal aerosol,spray; baclofen 20 mg oral tablet; buspirone 15 mg oral tablet; gabapentin 300 mg oral capsule; Remeron 15 mg oral tablet; Rexulti 0.5 mg oral tablet; Singulair 10 mg oral tablet; Trintellix 10 mg oral tablet; Tylenol Arthritis Pain 650 mg oral tablet extended release         Allergy List  NO KNOWN DRUG ALLERGIES         Family Medical History  Stroke; Heart Disease; Cancer, Unspecified; Diabetes, unspecified type; Diabetes Mellitus, Type II; Prostate cancer         Reproductive History   1 Para 1 0 0 0       Social History  Alcohol Use (Never); Claustophobic (Unknown); lives with children; lives with spouse; .; Recreational Drug Use (Former); Student.; Tobacco (Former); Unemployed.         Review of Systems  · Constitutional  o Denies  o :  "fever, chills, weight loss  · Cardiovascular  o Denies  o : chest pain, shortness of breath  · Gastrointestinal  o Denies  o : liver disease, heartburn, nausea, blood in stools  · Genitourinary  o Denies  o : painful urination, blood in urine  · Integument  o Denies  o : rash, itching  · Neurologic  o Denies  o : headache, weakness, loss of consciousness  · Musculoskeletal  o Admits  o : painful, swollen joints  · Psychiatric  o Denies  o : drug/alcohol addiction, anxiety, depression      Vitals  Date Time BP Position Site L\R Cuff Size HR RR TEMP (F) WT  HT  BMI kg/m2 BSA m2 O2 Sat HC       06/26/2020 02:56 PM      78 - R   236lbs 0oz 5'  6\" 38.09 2.23 97 %          Physical Examination  · Constitutional  o Appearance  o : well developed, well-nourished, no obvious deformities present  · Head and Face  o Head  o :   § Inspection  § : normocephalic  o Face  o :   § Inspection  § : no facial lesions  · Eyes  o Conjunctivae  o : conjunctivae normal  o Sclerae  o : sclerae white  · Ears, Nose, Mouth and Throat  o Ears  o :   § External Ears  § : appearance within normal limits  § Hearing  § : intact  o Nose  o :   § External Nose  § : appearance normal  · Neck  o Inspection/Palpation  o : normal appearance  o Range of Motion  o : full range of motion  · Respiratory  o Respiratory Effort  o : breathing unlabored  o Inspection of Chest  o : normal appearance  o Auscultation of Lungs  o : no audible wheezing or rales  · Cardiovascular  o Heart  o : regular rate  · Gastrointestinal  o Abdominal Examination  o : soft and non-tender  · Skin and Subcutaneous Tissue  o General Inspection  o : intact, no rashes  · Psychiatric  o General  o : Alert and oriented x3  o Judgement and Insight  o : judgment and insight intact  o Mood and Affect  o : mood normal, affect appropriate  · Right Shoulder  o Inspection  o : Near-full range of motion. Full external rotation. Good tone of deltoid, biceps, triceps, wrist extensors, and wrist " flexors. Neurovascularly intact. Sensation grossly intact.   · Imaging  o Imaging  o : MRI performed at [facility] on 04/23/2020 revealed: 1) Rotator cuff tendinosis with partial articular-sided tear of the supraspinatus tendon near the footplate. Intermediate signal changes seen extending to the bursal surface within this region which may be related to partial hearing of a higher-grade tear. No complete or retracted tear identified; 2) Mild degenerative changes of the AC joint with findings suggestive of distal clavicular osteolysis; 3) Mild degenerative tearing of the posterior superior labrum; 4) Mild biceps tenosynovitis.               Assessment  · Right shoulder pain, unspecified chronicity     719.41/M25.511  · Right shoulder partial rotator cuff tear     840.4/M75.100      Plan  · Medications  o Medications have been Reconciled  o Transition of Care or Provider Policy  · Instructions  o Reviewed the patient's Past Medical, Social, and Family history as well as the ROS at today's visit, no changes.  o Call or return if worsening symptoms.  o This note was transcribed by Jackie dietz.  o Continue conservative management and physical therapy. Follow up as needed.            Electronically Signed by: Jackie Quintana-, Other -Author on June 27, 2020 04:53:10 PM  Electronically Co-signed by: Sb Carrasquillo MD -Reviewer on June 29, 2020 09:28:14 AM

## 2021-05-13 NOTE — PROGRESS NOTES
Progress Note      Patient Name: Mallory Mata   Patient ID: [id number]   Sex: Female   YOB: 1975    Primary Care Provider: Tiffany Salomon MD   Referring Provider: Tiffany Salomon MD    Visit Date: October 2, 2020    Provider: Tiffany Salomon MD   Location: [facility]   Location Address: [address] [place] [address]  [id number]   Location Phone: [phone]          Chief Complaint  · Annual Wellness Exam      History Of Present Illness  The patient is a 45 year old /Black female who has come to this office for her Annual Wellness Visit.   Her Primary Care Provider is Tiffany Salomon MD. Her comprehensive Care Team list, including suppliers, has been updated on the Facesheet. Her medical/family history, height, weight, BMI, and blood pressure have been reviewed and are in the chart. The Health Risk Assessment has been completed and scanned in the chart.   Medications are listed in the medication list.   The active problem list includes: Allergic rhinitis, Anxiety, Contracture of muscle of multiple sites, Depression, Heart Murmur, High blood pressure, Left Hip Trochanteric Bursitis, Meningitis, Migraine Headaches, Recurrent major depressive disorder, in full remission, Rheumatic Fever, and Tendinitis, hip, Left   The patient does not have a history of substance use.   Patient reports her diet is adequate.   The Mini-Cog has been administered and is scanned in chart. The results are negative. Her cognitive function is without limitation.   A hearing loss screen was completed today and the result is negative.   Patient has the following risk factors for depression: currently has depression. Patient completed the PHQ-9 today and it has been scanned in the chart. The total score is 10-14.   The Get Up and Go screen was administered today and the result is negative.   The Godinez Index of Bryant in ADLs indicated full function (score of 6).   A Falls  Risk Assessment has been completed, including a review of home fall hazards and medication review.   Overall, the patient's functional ability is noted by this provider to be within normal limits. Her level of safety is noted to be within normal limits. Her balance/gait is within normal limits. There have been no falls in the past year. Patient-specific home safety recommendations have been reviewed and a copy has been given to patient.   She denies issues with leaking urine.   There are no additional risk factors identified.   Living Will/Advanced Directive has not previously been completed.   Personalized health advice was given to the patient and a written health screening schedule was established; see Plan for details.       Past Medical History  Disease Name Date Onset Notes   Allergic rhinitis --  --    Anxiety --  --    Contracture of muscle of multiple sites 04/18/2016 will cont baclofen and botox   Depression --  --    Heart Murmur --  --    High blood pressure --  --    Hypertension --  --    Left Hip Trochanteric Bursitis 10/05/2017 --    Meningitis 01/08/2016 this occurred as a child, but she has a signfiicant amount of contractures on her left side will schedule physical therapy refer to PM&R start baclofen   Migraine Headaches --  --    Recurrent major depressive disorder, in full remission 09/11/2017 doing well on trintellix says it keeps her smiling :)   Rheumatic Fever --  --    Tendinitis, hip, Left 10/05/2017 --          Past Surgical History  Procedure Name Date Notes   Broken Bones 1988 & 1995 Left Arm   Metal implants --  --          Medication List  Name Date Started Instructions   azelastine 137 mcg (0.1 %) nasal aerosol,spray 03/12/2020 spray 2 sprays in each nostril by intranasal route 2 times per day   baclofen 20 mg oral tablet 10/02/2020 TAKE 1 TABLET BY MOUTH THREE TIMES DAILY   buspirone 15 mg oral tablet 10/06/2020 take 1 tablet (15 mg) by oral route 3 times per day for 90 days    Chantix Continuing Month Box 1 mg oral tablet 10/02/2020 take 1 tablet (1 mg) with glass of water by oral route 2 times per day after meals   gabapentin 300 mg oral capsule 2020 TAKE ONE CAPSULE BY MOUTH 1 TO 3 TIMES DAILY AS NEEDED   Medrol (Immanuel) 4 mg oral tablets,dose pack 10/19/2020 take as directed   naproxen 500 mg oral tablet 10/02/2020 take 1 tablet (500 mg) by oral route 2 times per day with food   Norco 7.5-325 mg oral tablet 10/19/2020 take 1 tablet by oral route once a day (at bedtime)   Rexulti 0.5 mg oral tablet  take 1 tablet by oral route at bedtime   Singulair 10 mg oral tablet 10/02/2020 take 1 tablet (10 mg) by oral route once daily in the evening for 90 days   Topamax 25 mg oral tablet 10/02/2020 take 1 tablet (25 mg) by oral route BID   Trintellix 10 mg oral tablet 2020 take 1 tablet (10 mg) by oral route once daily at the same time each day         Allergy List  Allergen Name Date Reaction Notes   NO KNOWN DRUG ALLERGIES --  --  --          Family Medical History  Disease Name Relative/Age Notes   Stroke  --    Heart Disease  --    Cancer, Unspecified Father/   Father   Diabetes, unspecified type Mother/   Mother   Diabetes Mellitus, Type II  --    Prostate cancer Father/   --          Reproductive History  Menstrual   Last Menstrual Period: 2013 Method of Birth Control: None   Pregnancy Summary   Total Pregnancies: 1 Full Term: 1 Premature: 0   Ab Induced: 0 Ab Spontaneous: 0 Ectopics: 0   Multiples: 0 Livin         Social History  Finding Status Start/Stop Quantity Notes   Alcohol Use Never --/-- --  does not drink   Claustophobic Unknown --/-- --  yes   lives with children --  --/-- --  --    lives with spouse --  --/-- --  --    . --  --/-- --  --    Recreational Drug Use Former --/-- --  in the past  in the past  no   Student. --  --/-- --  --    Tobacco Former --/-- --  former smoker  current every day smoker, 0.5 pack per day, smoked 6-10 years  former  "smoker   Unemployed. --  --/-- --  --          Immunizations  NameDate Admin Mfg Trade Name Lot Number Route Inj VIS Given VIS Publication   Jffajzrcg36/02/2020 PMC Fluzone Quadrivalent [id number]  RD 10/02/2020 08/15/2019   Comments: pt tolerated well and left office in stable condition, [facility]   Mfsjakdtq5715/21/2016 MSD PNEUMOVAX 23 [id number] IM RD 11/21/2016 04/25/2015   Comments: pt tolerated well and left office in stable condition, [facility]   Prevnar 1307/09/2018 [facility] PREVNAR 13 [id number] IM RD 07/09/2018 11/05/2015   Comments: Pt tolerated well and left office in stable condition         Vitals  Date Time BP Position Site L\R Cuff Size HR RR TEMP (F) WT  HT  BMI kg/m2 BSA m2 O2 Sat FR L/min FiO2 HC       10/02/2020 10:41 /88 Sitting    117 - R  97.6 203lbs 16oz 5'  6\" 32.93 2.08 98 %  21%              Assessment  · Encounter for Medicare annual wellness exam     V70.0/Z00.00  · Screening for depression     V79.0/Z13.89  · Screening for alcoholism     V79.1/Z13.39  · Advanced care planning/counseling discussion     V65.49/Z71.89    Problems Reconciled  Plan  · Orders  o Falls Risk Assessment Completed (2908F) - V70.0/Z00.00 - 10/02/2020  o Brief hearing screening (written) [facility] () - V70.0/Z00.00 - 10/02/2020  o Annual Wellness Visit-includes a Personalized Prevention Plan of Service (PPS), SUBSEQUENT VISIT (Medicare) () - V70.0/Z00.00 - 10/02/2020  o ACO-13: Fall Risk Screening with no falls in past year or only one fall without injury in the past year (1101F) - V70.0/Z00.00 - 10/02/2020  o Presence or absence of urinary incontinence assessed (GABBY) (1090F) - V70.0/Z00.00 - 10/02/2020  o ACO-18: Positive screen for clinical depression using a standardized tool and a follow-up plan documented () - V79.0/Z13.89 - 10/02/2020  o Negative alcohol screening () - V79.1/Z13.39 - 10/02/2020  o ACO - Pt declines to or was not able to provide an Advance Care Plan or name a Surrogate Decision " Maker (1124F) - V65.49/Z71.89 - 10/02/2020  o ACO-39: Current medications updated and reviewed (1159F, ) - - 10/02/2020  o ACO-20: Screening Mammography documented and reviewed () - - 10/02/2020  o Influenza immunization was ordered or administered () - - 10/02/2020  o ACO-15: Pneumococcal Vaccine Administered or Previously Received (4040F) - - 10/02/2020  o ACO-16: BMI outside normal range, no follow-up plan is documented due to documented reason () - - 10/02/2020   refused  o Annual wellness visit, includes a personalized prevention plan of service (pps), subsequent visit () - - 10/02/2020  · Medications  o Medications have been Reconciled  o Transition of Care or Provider Policy  · Instructions  o Health Risk Assessment has been reviewed with the patient.  o Written health screening schedule for next 5-10 years was established with patient; information scanned in chart and given/mailed to patient.  o Fall prevention methods discussed and a copy of recommendations given/mailed to patient.  o Today's PHQ-9 score is: _14__            Electronically Signed by: Tiffany Salomon MD -Author on October 29, 2020 09:29:41 PM

## 2021-05-14 VITALS
OXYGEN SATURATION: 98 % | BODY MASS INDEX: 32.78 KG/M2 | HEART RATE: 117 BPM | SYSTOLIC BLOOD PRESSURE: 122 MMHG | DIASTOLIC BLOOD PRESSURE: 88 MMHG | HEIGHT: 66 IN | WEIGHT: 204 LBS | TEMPERATURE: 97.6 F

## 2021-05-14 VITALS
SYSTOLIC BLOOD PRESSURE: 102 MMHG | HEART RATE: 103 BPM | OXYGEN SATURATION: 99 % | HEIGHT: 66 IN | BODY MASS INDEX: 30.13 KG/M2 | DIASTOLIC BLOOD PRESSURE: 76 MMHG | WEIGHT: 187.5 LBS | TEMPERATURE: 98.2 F

## 2021-05-14 VITALS — HEART RATE: 99 BPM | OXYGEN SATURATION: 98 % | WEIGHT: 187 LBS | HEIGHT: 66 IN | BODY MASS INDEX: 30.05 KG/M2

## 2021-05-14 VITALS — WEIGHT: 204 LBS | BODY MASS INDEX: 32.78 KG/M2 | HEIGHT: 66 IN

## 2021-05-14 VITALS — OXYGEN SATURATION: 98 % | HEIGHT: 66 IN | BODY MASS INDEX: 33.43 KG/M2 | HEART RATE: 88 BPM | WEIGHT: 208 LBS

## 2021-05-14 NOTE — PROGRESS NOTES
"   Progress Note      Patient Name: Mallory Mata   Patient ID: [id number]   Sex: Female   YOB: 1975    Primary Care Provider: Tiffany Salomon MD    Visit Date: February 2, 2021    Provider: Tiffany Salomon MD   Location: [facility]   Location Address: [address] [place] [address], [address]  [place], [place]  [id number]   Location Phone: [phone]          Chief Complaint  · \"just my regular follow up\"  · \"discuss a nasal spray\"      History Of Present Illness  Mallory Mata is a 45 year old /Black female who presents for evaluation and treatment of:      chronic issues    says she is doing well    Dr been visit last month -  she states he told her there was an injury in her neck and that he recommended PT for her right side   She is currently doing PT and thinks it is helping  Reviewed note from that visit    nasal spray -  wants to take the nasal spray   says she is still taking the allergy pill but it's not helping completely    diet plan -   currently eating no red meat/poultry   eating shrimp/fish, as well as vegetables, fruit, and beans       Past Medical History  Disease Name Date Onset Notes   Allergic rhinitis --  --    Anxiety --  --    Contracture of muscle of multiple sites 04/18/2016 will cont baclofen and botox   Depression --  --    Heart Murmur --  --    High blood pressure --  --    Hypertension --  --    Left Hip Trochanteric Bursitis 10/05/2017 --    Meningitis 01/08/2016 this occurred as a child, but she has a signfiicant amount of contractures on her left side will schedule physical therapy refer to PM&R start baclofen   Migraine Headaches --  --    Recurrent major depressive disorder, in full remission 09/11/2017 doing well on trintellix says it keeps her smiling :)   Rheumatic Fever --  --    Tendinitis, hip, Left 10/05/2017 --          Past Surgical History  Procedure Name Date Notes   Broken Bones 1988 & 1995 Left Arm   Metal implants --  --  "         Medication List  Name Date Started Instructions   baclofen 20 mg oral tablet 2021 TAKE 1 TABLET BY MOUTH THREE TIMES DAILY   buspirone 15 mg oral tablet 10/06/2020 take 1 tablet (15 mg) by oral route 3 times per day for 90 days   fluticasone propionate 50 mcg/actuation nasal spray,suspension 02/15/2021 spray 2 sprays (100 mcg) in each nostril by intranasal route once daily as needed for 30 days   gabapentin 300 mg oral capsule 2021 TAKE ONE CAPSULE BY MOUTH 1 TO 3 TIMES DAILY AS NEEDED   naproxen 500 mg oral tablet 2021 take 1 tablet (500 mg) by oral route 2 times per day with food   Rexulti 0.5 mg oral tablet  take 1 tablet by oral route at bedtime   Singulair 10 mg oral tablet 10/02/2020 take 1 tablet (10 mg) by oral route once daily in the evening for 90 days   Topamax 25 mg oral tablet 10/02/2020 take 1 tablet (25 mg) by oral route BID   Trintellix 10 mg oral tablet 2020 take 1 tablet (10 mg) by oral route once daily at the same time each day         Allergy List  Allergen Name Date Reaction Notes   NO KNOWN DRUG ALLERGIES --  --  --          Family Medical History  Disease Name Relative/Age Notes   Stroke  --    Heart Disease  --    Cancer, Unspecified Father/   Father   Diabetes, unspecified type Mother/   Mother   Diabetes Mellitus, Type II  --    Prostate cancer Father/   --          Reproductive History  Menstrual   Last Menstrual Period: 2013 Method of Birth Control: None   Pregnancy Summary   Total Pregnancies: 1 Full Term: 1 Premature: 0   Ab Induced: 0 Ab Spontaneous: 0 Ectopics: 0   Multiples: 0 Livin         Social History  Finding Status Start/Stop Quantity Notes   Alcohol Use Never --/-- --  does not drink   Claustophobic Unknown --/-- --  yes   lives with children --  --/-- --  --    lives with spouse --  --/-- --  --    . --  --/-- --  --    Recreational Drug Use Former --/-- --  in the past  in the past  no   Student. --  --/-- --  --    Tobacco  "Former --/-- --  former smoker  current every day smoker, 0.5 pack per day, smoked 6-10 years  former smoker   Unemployed. --  --/-- --  --          Immunizations  NameDate Admin Mfg Trade Name Lot Number Route Inj VIS Given VIS Publication   Vmuhlvvuu32/02/2020 PMC Fluzone Quadrivalent [id number] [facility] 10/02/2020 08/15/2019   Comments: pt tolerated well and left office in stable condition, [facility]   Upwknfdqi6075/21/2016 MSD PNEUMOVAX 23 [id number]  RD 11/21/2016 04/25/2015   Comments: pt tolerated well and left office in stable condition, [facility]   Prevnar 1307/09/2018 [facility] PREVNAR 13 [id number]  RD 07/09/2018 11/05/2015   Comments: Pt tolerated well and left office in stable condition         Vitals  Date Time BP Position Site L\R Cuff Size HR RR TEMP (F) WT  HT  BMI kg/m2 BSA m2 O2 Sat FR L/min FiO2 HC       09/28/2020 03:47 PM      88 - R   208lbs 0oz 5'  6\" 33.57 2.1 98 %      10/02/2020 10:41 /88 Sitting    117 - R  97.6 203lbs 16oz 5'  6\" 32.93 2.08 98 %  21%    10/19/2020 10:19 AM         203lbs 16oz 5'  6\" 32.93 2.08       02/02/2021 10:47 /76 Sitting    103 - R  98.2 187lbs 8oz 5'  6\" 30.26 1.99 99 %  21%          Physical Examination  · Constitutional  o Appearance  o : no acute distress, well-nourished  · Head and Face  o Head  o :   § Inspection  § : atraumatic, normocephalic  · Eyes  o Eyes  o : extraocular movements intact, no scleral icterus, no conjunctival injection  · Respiratory  o Respiratory Effort  o : breathing comfortably, symmetric chest rise  o Auscultation of Lungs  o : clear to asculatation bilaterally, no wheezes, rales, or rhonchii  · Cardiovascular  o Heart  o :   § Auscultation of Heart  § : regular rate and rhythm, no murmurs, rubs, or gallops  o Peripheral Vascular System  o :   § Extremities  § : no edema  · Neurologic  o Mental Status Examination  o :   § Orientation  § : grossly oriented to person, place and time  · Psychiatric  o General  o : normal mood and " affect          Assessment  · Contracture of muscle of multiple sites     728.85/M62.49  Continue work with physical medicine and rehabilitation  · Recurrent major depressive disorder, in full remission     296.36/F33.42  Well-controlled on current meds  · High blood pressure     401.9/I10  Stable continue to monitor  · Allergic rhinitis due to allergen     477.9/J30.9  Will add Flonase    Problems Reconciled  Plan  · Orders  o ACO-39: Current medications updated and reviewed (1159F, ) - - 02/02/2021  · Medications  o fluticasone propionate 50 mcg/actuation nasal spray,suspension   SIG: spray 2 sprays (100 mcg) in each nostril by intranasal route once daily as needed for 30 days   DISP: (1) Bottle with 2 refills  Prescribed on 02/02/2021     o Medications have been Reconciled  o Transition of Care or Provider Policy  · Instructions  o Patient was educated/instructed on their diagnosis, treatment and medications prior to discharge from the clinic today.  · Disposition  o 6 Month Follow Up            Electronically Signed by: Tiffany Salomon MD -Author on March 11, 2021 01:12:17 PM

## 2021-05-14 NOTE — PROGRESS NOTES
Progress Note      Patient Name: Mallory Mata   Patient ID: [id number]   Sex: Female   YOB: 1975    Primary Care Provider: Tiffany Salomon MD   Referring Provider: Sb Carrasquillo MD    Visit Date: February 3, 2021    Provider: Sb Carrasquillo MD   Location: [facility]   Location Address: [address] [place] [address]  [id number]   Location Phone: [phone]          Chief Complaint  · Cervical Spine Pain      History Of Present Illness  Mallory Mata is a 45 year old /Black female who presents today to [facility].      Patient presents today for a follow-up of cervical spine pain. Patient had been experiencing sharp pain from her neck that radiates down to her fingers. Patient is having limited ROM of her neck and decreased ROM of shoulder. Patient states that she had a crook in her neck when she woke up one morning causing her shoulder pain. Patient states that she woke up this way. Since her last visit with us on 10/19/20, patient has been attending physical therapy which has given her significant improvement with pain. She states she has residual weakness in her right upper extremity but overall doing better since we last saw her.       Past Medical History  Allergic rhinitis; Anxiety; Contracture of muscle of multiple sites; Depression; Heart Murmur; High blood pressure; Hypertension; Left Hip Trochanteric Bursitis; Meningitis; Migraine Headaches; Recurrent major depressive disorder, in full remission; Rheumatic Fever; Tendinitis, hip, Left         Past Surgical History  Broken Bones; Metal implants         Medication List  baclofen 20 mg oral tablet; buspirone 15 mg oral tablet; fluticasone propionate 50 mcg/actuation nasal spray,suspension; gabapentin 300 mg oral capsule; naproxen 500 mg oral tablet; Rexulti 0.5 mg oral tablet; Singulair 10 mg oral tablet; Topamax 25 mg oral tablet; Trintellix 10 mg oral tablet         Allergy List  NO KNOWN DRUG ALLERGIES  "      Allergies Reconciled  Family Medical History  Stroke; Heart Disease; Cancer, Unspecified; Diabetes, unspecified type; Diabetes Mellitus, Type II; Prostate cancer         Reproductive History   1 Para 1 0 0 0       Social History  Alcohol Use (Never); Claustophobic (Unknown); lives with children; lives with spouse; .; Recreational Drug Use (Former); Student.; Tobacco (Former); Unemployed.         Immunizations  Name Date Admin   Influenza 10/02/2020   Influenza 2019   Influenza 2017   Srnyjwudj77 2016   Prevnar 13 2018         Review of Systems  · Constitutional  o Denies  o : fever, chills, weight loss  · Cardiovascular  o Denies  o : chest pain, shortness of breath  · Gastrointestinal  o Denies  o : liver disease, heartburn, nausea, blood in stools  · Genitourinary  o Denies  o : painful urination, blood in urine  · Integument  o Denies  o : rash, itching  · Neurologic  o Denies  o : headache, weakness, loss of consciousness  · Musculoskeletal  o Denies  o : painful, swollen joints  · Psychiatric  o Denies  o : drug/alcohol addiction, anxiety, depression      Vitals  Date Time BP Position Site L\R Cuff Size HR RR TEMP (F) WT  HT  BMI kg/m2 BSA m2 O2 Sat FR L/min FiO2 HC       2021 07:38 AM      99 - R   187lbs 0oz 5'  6\" 30.18 1.99 98 %            Physical Examination  · Constitutional  o Appearance  o : well developed, well-nourished, no obvious deformities present  · Head and Face  o Head  o :   § Inspection  § : normocephalic  o Face  o :   § Inspection  § : no facial lesions  · Eyes  o Conjunctivae  o : conjunctivae normal  o Sclerae  o : sclerae white  · Ears, Nose, Mouth and Throat  o Ears  o :   § External Ears  § : appearance within normal limits  § Hearing  § : intact  o Nose  o :   § External Nose  § : appearance normal  · Neck  o Inspection/Palpation  o : normal appearance  o Range of Motion  o : full range of motion  · Respiratory  o Respiratory " Effort  o : breathing unlabored  o Inspection of Chest  o : normal appearance  o Auscultation of Lungs  o : no audible wheezing or rales  · Cardiovascular  o Heart  o : regular rate  · Gastrointestinal  o Abdominal Examination  o : soft and non-tender  · Skin and Subcutaneous Tissue  o General Inspection  o : intact, no rashes  · Psychiatric  o General  o : Alert and oriented x3  o Judgement and Insight  o : judgment and insight intact  o Mood and Affect  o : mood normal, affect appropriate  · Extremities  o Extremities  o : RIGHT UPPER EXTREMITY: Sensation grossly intact. Neurovascular intact. Skin intact. Positive pulses. Good tone of deltoid, biceps, triceps, wrist extensors, and wrist flexors. Negative spurling's maneuver. No swelling, skin discoloration or atrophy. Good flexion and extension of the elbow. Near full shoulder range of motion. Weakness with strength.   · Imaging  o Imaging  o : 10/15/20 CERVICAL SPINE MRI: 1. Mild multilevel degenerative disc disease change. 2. No focal disc protrusions or free disc fragments are evident. 3. There is mild neural foraminal narrowing but there is no spinal canal stenosis. There is an incidental left paracentral protrusion at T1-T2.               Assessment  · Neck pain     723.1/M54.2  · Right upper extremity weakness     729.89/R29.898      Plan  · Medications  o Medications have been Reconciled  o Transition of Care or Provider Policy  · Instructions  o  Been saw and examined the patient and agrees with plan.   o Reviewed the patient's Past Medical, Social, and Family history as well as the ROS at today's visit, no changes.  o Call or return if worsening symptoms.  o Follow Up PRN.  o This note was transcribed by Kylie Hanley. j luis  o Discussed treatment plans with the patient. Patient wants a renewal for physical therapy. Patient given a prescription for physical therapy to work on her strength. If pain has increasing pain in her neck an increased weakness, we  will refer her to Neuro.             Electronically Signed by: Kylie Hanley-, Other -Author on February 11, 2021 10:02:28 AM  Electronically Co-signed by: Sb Carrasquillo MD -Reviewer on February 15, 2021 04:59:14 PM

## 2021-05-15 VITALS
BODY MASS INDEX: 29.57 KG/M2 | HEIGHT: 66 IN | HEART RATE: 107 BPM | WEIGHT: 184 LBS | OXYGEN SATURATION: 98 % | SYSTOLIC BLOOD PRESSURE: 112 MMHG | TEMPERATURE: 97.3 F | DIASTOLIC BLOOD PRESSURE: 74 MMHG

## 2021-05-15 VITALS — BODY MASS INDEX: 32.77 KG/M2 | HEIGHT: 66 IN | OXYGEN SATURATION: 99 % | HEART RATE: 121 BPM

## 2021-05-15 VITALS — HEIGHT: 66 IN | WEIGHT: 187.37 LBS | HEART RATE: 102 BPM | BODY MASS INDEX: 30.11 KG/M2 | OXYGEN SATURATION: 99 %

## 2021-05-15 VITALS
SYSTOLIC BLOOD PRESSURE: 150 MMHG | BODY MASS INDEX: 32.62 KG/M2 | WEIGHT: 203 LBS | DIASTOLIC BLOOD PRESSURE: 90 MMHG | TEMPERATURE: 96.9 F | HEART RATE: 144 BPM | HEIGHT: 66 IN | OXYGEN SATURATION: 97 %

## 2021-05-15 VITALS
HEIGHT: 66 IN | TEMPERATURE: 98 F | OXYGEN SATURATION: 99 % | RESPIRATION RATE: 16 BRPM | BODY MASS INDEX: 29.89 KG/M2 | WEIGHT: 186 LBS | SYSTOLIC BLOOD PRESSURE: 118 MMHG | HEART RATE: 107 BPM | DIASTOLIC BLOOD PRESSURE: 74 MMHG

## 2021-05-15 VITALS — WEIGHT: 215 LBS | BODY MASS INDEX: 34.55 KG/M2 | HEIGHT: 66 IN

## 2021-05-15 VITALS — WEIGHT: 236 LBS | OXYGEN SATURATION: 97 % | HEIGHT: 66 IN | BODY MASS INDEX: 37.93 KG/M2 | HEART RATE: 78 BPM

## 2021-05-15 VITALS
HEIGHT: 66 IN | WEIGHT: 215.5 LBS | SYSTOLIC BLOOD PRESSURE: 130 MMHG | BODY MASS INDEX: 34.63 KG/M2 | HEART RATE: 119 BPM | TEMPERATURE: 97.9 F | OXYGEN SATURATION: 100 % | DIASTOLIC BLOOD PRESSURE: 78 MMHG

## 2021-05-15 VITALS — BODY MASS INDEX: 30.53 KG/M2 | WEIGHT: 190 LBS | HEART RATE: 66 BPM | OXYGEN SATURATION: 99 % | HEIGHT: 66 IN

## 2021-05-16 VITALS
HEART RATE: 106 BPM | RESPIRATION RATE: 16 BRPM | DIASTOLIC BLOOD PRESSURE: 74 MMHG | OXYGEN SATURATION: 98 % | SYSTOLIC BLOOD PRESSURE: 130 MMHG | HEIGHT: 66 IN | BODY MASS INDEX: 30.23 KG/M2 | TEMPERATURE: 97.4 F | WEIGHT: 188.12 LBS

## 2021-05-16 VITALS — WEIGHT: 170 LBS | OXYGEN SATURATION: 100 % | HEART RATE: 104 BPM | BODY MASS INDEX: 27.32 KG/M2 | HEIGHT: 66 IN

## 2021-05-16 VITALS — HEART RATE: 106 BPM | BODY MASS INDEX: 30.7 KG/M2 | WEIGHT: 191 LBS | OXYGEN SATURATION: 98 % | HEIGHT: 66 IN

## 2021-05-16 VITALS
SYSTOLIC BLOOD PRESSURE: 131 MMHG | WEIGHT: 170 LBS | HEIGHT: 66 IN | BODY MASS INDEX: 27.32 KG/M2 | OXYGEN SATURATION: 100 % | HEART RATE: 107 BPM | TEMPERATURE: 97.9 F | RESPIRATION RATE: 16 BRPM | DIASTOLIC BLOOD PRESSURE: 64 MMHG

## 2021-05-16 VITALS
HEART RATE: 98 BPM | SYSTOLIC BLOOD PRESSURE: 102 MMHG | DIASTOLIC BLOOD PRESSURE: 58 MMHG | WEIGHT: 178.25 LBS | HEIGHT: 66 IN | OXYGEN SATURATION: 98 % | RESPIRATION RATE: 16 BRPM | BODY MASS INDEX: 28.65 KG/M2 | TEMPERATURE: 97 F

## 2021-06-28 ENCOUNTER — OFFICE VISIT (OUTPATIENT)
Dept: ORTHOPEDIC SURGERY | Facility: CLINIC | Age: 46
End: 2021-06-28

## 2021-06-28 VITALS — HEIGHT: 66 IN | BODY MASS INDEX: 26.13 KG/M2 | WEIGHT: 162.6 LBS

## 2021-06-28 DIAGNOSIS — M54.2 NECK PAIN: ICD-10-CM

## 2021-06-28 DIAGNOSIS — M79.18 MYOFASCIAL MUSCLE PAIN: ICD-10-CM

## 2021-06-28 DIAGNOSIS — R53.1 WEAKNESS: Primary | ICD-10-CM

## 2021-06-28 PROCEDURE — 99212 OFFICE O/P EST SF 10 MIN: CPT | Performed by: PHYSICIAN ASSISTANT

## 2021-06-28 NOTE — PATIENT INSTRUCTIONS
Patient will follow up with neuro for cervical spine pain. Recommend continuing HEP. Follow up, with any changes or concerns.

## 2021-06-28 NOTE — PROGRESS NOTES
"Chief Complaint  Follow-up of the Right Shoulder    Subjective          Mallory Mata presents to [facility] for follow up right shoulder pain. Patient has cervical spine pain, causing weakness and pain in her right upper extremity. Patient has been doing PT for several months. She states that she has made some improvement in her strength since starting. She states that her weakness in her dominant hand makes daily activities difficult for her. She was given referral for neuro at her last visit in February, but has not been seen for her cervical spine.     Objective   Vital Signs:   Ht 167.6 cm (66\")   Wt 73.8 kg (162 lb 9.6 oz)   BMI 26.24 kg/m²       Physical Exam  Constitutional:       Appearance: Normal appearance. He is well-developed and normal weight.   HENT:      Head: Normocephalic.      Right Ear: Hearing and external ear normal.      Left Ear: Hearing and external ear normal.      Nose: Nose normal.   Eyes:      Conjunctiva/sclera: Conjunctivae normal.   Cardiovascular:      Rate and Rhythm: Normal rate.   Pulmonary:      Effort: Pulmonary effort is normal.      Breath sounds: No wheezing or rales.   Abdominal:      Palpations: Abdomen is soft.      Tenderness: There is no abdominal tenderness.   Musculoskeletal:      Cervical back: Normal range of motion.   Skin:     Findings: No rash.   Neurological:      Mental Status: He is alert and oriented to person, place, and time.   Psychiatric:         Mood and Affect: Mood and affect normal.         Judgment: Judgment normal.     Ortho Exam  Right shoulder: No swelling or atrophy.  Tenderness over the trapezoid muscle.  Full active range of motion with forward flexion and extension.  Full active abduction.  Full active range of motion of the elbow.  Muscle strength is 4/5 the biceps, triceps and deltoid muscle.  Sensation is intact.  Neurovascular intact.  Radial and ulnar pulse are 2+.  Result Review :   The following data was " reviewed by: VINNIE Loera on 06/28/2021:         Imaging Results (Most Recent)     None                Assessment and Plan    Problem List Items Addressed This Visit        Musculoskeletal and Injuries    Neck pain    Relevant Orders    Ambulatory Referral to Neurology    Myofascial muscle pain       Symptoms and Signs    Weakness, right upper extremity - Primary    Relevant Orders    Ambulatory Referral to Neurology          Follow Up   Return if symptoms worsen or fail to improve.  Patient Instructions   Patient will follow up with neuro for cervical spine pain. Recommend continuing HEP. Follow up, with any changes or concerns.     Patient was given instructions and counseling regarding her condition or for health maintenance advice. Please see specific information pulled into the AVS if appropriate.

## 2021-07-02 ENCOUNTER — HOSPITAL ENCOUNTER (EMERGENCY)
Facility: HOSPITAL | Age: 46
Discharge: HOME | End: 2021-07-02
Attending: EMERGENCY MEDICINE | Admitting: EMERGENCY MEDICINE

## 2021-07-02 ENCOUNTER — APPOINTMENT (OUTPATIENT)
Dept: GENERAL RADIOLOGY | Facility: HOSPITAL | Age: 46
End: 2021-07-02

## 2021-07-02 VITALS
HEIGHT: 66 IN | RESPIRATION RATE: 16 BRPM | WEIGHT: 163.14 LBS | BODY MASS INDEX: 26.22 KG/M2 | HEART RATE: 89 BPM | SYSTOLIC BLOOD PRESSURE: 142 MMHG | TEMPERATURE: 98.1 F | DIASTOLIC BLOOD PRESSURE: 86 MMHG | OXYGEN SATURATION: 100 %

## 2021-07-02 DIAGNOSIS — R07.9 CHEST PAIN OF UNCERTAIN ETIOLOGY: Primary | ICD-10-CM

## 2021-07-02 LAB
ALBUMIN SERPL-MCNC: 3.9 G/DL (ref 3.5–5.2)
ALBUMIN/GLOB SERPL: 1.4 G/DL
ALP SERPL-CCNC: 94 U/L (ref 39–117)
ALT SERPL W P-5'-P-CCNC: 12 U/L (ref 1–33)
ANION GAP SERPL CALCULATED.3IONS-SCNC: 9.9 MMOL/L (ref 5–15)
AST SERPL-CCNC: 15 U/L (ref 1–32)
BASOPHILS # BLD AUTO: 0.05 10*3/MM3 (ref 0–0.2)
BASOPHILS NFR BLD AUTO: 0.7 % (ref 0–1.5)
BILIRUB SERPL-MCNC: 0.2 MG/DL (ref 0–1.2)
BUN SERPL-MCNC: 9 MG/DL (ref 6–20)
BUN/CREAT SERPL: 13 (ref 7–25)
CALCIUM SPEC-SCNC: 9.5 MG/DL (ref 8.6–10.5)
CHLORIDE SERPL-SCNC: 103 MMOL/L (ref 98–107)
CK MB SERPL-CCNC: 1.71 NG/ML
CK SERPL-CCNC: 135 U/L (ref 20–180)
CO2 SERPL-SCNC: 25.1 MMOL/L (ref 22–29)
CREAT SERPL-MCNC: 0.69 MG/DL (ref 0.57–1)
DEPRECATED RDW RBC AUTO: 42.5 FL (ref 37–54)
EOSINOPHIL # BLD AUTO: 0.17 10*3/MM3 (ref 0–0.4)
EOSINOPHIL NFR BLD AUTO: 2.4 % (ref 0.3–6.2)
ERYTHROCYTE [DISTWIDTH] IN BLOOD BY AUTOMATED COUNT: 12.9 % (ref 12.3–15.4)
GFR SERPL CREATININE-BSD FRML MDRD: 112 ML/MIN/1.73
GLOBULIN UR ELPH-MCNC: 2.8 GM/DL
GLUCOSE SERPL-MCNC: 127 MG/DL (ref 65–99)
HCT VFR BLD AUTO: 39.9 % (ref 34–46.6)
HGB BLD-MCNC: 12.8 G/DL (ref 12–15.9)
HOLD SPECIMEN: NORMAL
IMM GRANULOCYTES # BLD AUTO: 0.01 10*3/MM3 (ref 0–0.05)
IMM GRANULOCYTES NFR BLD AUTO: 0.1 % (ref 0–0.5)
LIPASE SERPL-CCNC: 111 U/L (ref 13–60)
LYMPHOCYTES # BLD AUTO: 1.83 10*3/MM3 (ref 0.7–3.1)
LYMPHOCYTES NFR BLD AUTO: 25.3 % (ref 19.6–45.3)
MAGNESIUM SERPL-MCNC: 1.9 MG/DL (ref 1.6–2.6)
MCH RBC QN AUTO: 28.8 PG (ref 26.6–33)
MCHC RBC AUTO-ENTMCNC: 32.1 G/DL (ref 31.5–35.7)
MCV RBC AUTO: 89.9 FL (ref 79–97)
MONOCYTES # BLD AUTO: 0.74 10*3/MM3 (ref 0.1–0.9)
MONOCYTES NFR BLD AUTO: 10.2 % (ref 5–12)
NEUTROPHILS NFR BLD AUTO: 4.43 10*3/MM3 (ref 1.7–7)
NEUTROPHILS NFR BLD AUTO: 61.3 % (ref 42.7–76)
NRBC BLD AUTO-RTO: 0 /100 WBC (ref 0–0.2)
NT-PROBNP SERPL-MCNC: 18.6 PG/ML (ref 0–450)
PLATELET # BLD AUTO: 300 10*3/MM3 (ref 140–450)
PMV BLD AUTO: 10 FL (ref 6–12)
POTASSIUM SERPL-SCNC: 3.8 MMOL/L (ref 3.5–5.2)
PROT SERPL-MCNC: 6.7 G/DL (ref 6–8.5)
RBC # BLD AUTO: 4.44 10*6/MM3 (ref 3.77–5.28)
SODIUM SERPL-SCNC: 138 MMOL/L (ref 136–145)
TROPONIN I SERPL-MCNC: 0 NG/ML (ref 0–0.6)
WBC # BLD AUTO: 7.23 10*3/MM3 (ref 3.4–10.8)
WHOLE BLOOD HOLD SPECIMEN: NORMAL

## 2021-07-02 PROCEDURE — 83690 ASSAY OF LIPASE: CPT | Performed by: EMERGENCY MEDICINE

## 2021-07-02 PROCEDURE — 82550 ASSAY OF CK (CPK): CPT | Performed by: EMERGENCY MEDICINE

## 2021-07-02 PROCEDURE — 80053 COMPREHEN METABOLIC PANEL: CPT | Performed by: EMERGENCY MEDICINE

## 2021-07-02 PROCEDURE — 71045 X-RAY EXAM CHEST 1 VIEW: CPT

## 2021-07-02 PROCEDURE — 85025 COMPLETE CBC W/AUTO DIFF WBC: CPT

## 2021-07-02 PROCEDURE — 99283 EMERGENCY DEPT VISIT LOW MDM: CPT

## 2021-07-02 PROCEDURE — 93010 ELECTROCARDIOGRAM REPORT: CPT | Performed by: SPECIALIST

## 2021-07-02 PROCEDURE — 83735 ASSAY OF MAGNESIUM: CPT | Performed by: EMERGENCY MEDICINE

## 2021-07-02 PROCEDURE — 93005 ELECTROCARDIOGRAM TRACING: CPT

## 2021-07-02 PROCEDURE — 82553 CREATINE MB FRACTION: CPT | Performed by: EMERGENCY MEDICINE

## 2021-07-02 PROCEDURE — 83880 ASSAY OF NATRIURETIC PEPTIDE: CPT | Performed by: EMERGENCY MEDICINE

## 2021-07-02 PROCEDURE — 36415 COLL VENOUS BLD VENIPUNCTURE: CPT

## 2021-07-02 PROCEDURE — 84484 ASSAY OF TROPONIN QUANT: CPT

## 2021-07-02 PROCEDURE — 93005 ELECTROCARDIOGRAM TRACING: CPT | Performed by: EMERGENCY MEDICINE

## 2021-07-02 RX ORDER — SODIUM CHLORIDE 0.9 % (FLUSH) 0.9 %
10 SYRINGE (ML) INJECTION AS NEEDED
Status: DISCONTINUED | OUTPATIENT
Start: 2021-07-02 | End: 2021-07-02 | Stop reason: HOSPADM

## 2021-07-02 RX ORDER — ASPIRIN 81 MG/1
324 TABLET, CHEWABLE ORAL ONCE
Status: COMPLETED | OUTPATIENT
Start: 2021-07-02 | End: 2021-07-02

## 2021-07-02 RX ADMIN — ASPIRIN 324 MG: 81 TABLET, CHEWABLE ORAL at 09:28

## 2021-07-02 NOTE — DISCHARGE INSTRUCTIONS
Follow-up with Dr. Bell or Dr. Adams, cardiology, to arrange an outpatient cardiac stress test.  May take Motrin and/or Tylenol as needed for pain.

## 2021-07-02 NOTE — ED PROVIDER NOTES
"Subjective   Mallory Mata is a 46 y.o. female with a hx of HTN who presents to the emergency department today with complaints of chest pain since this morning. Pt describes pain as a \"tightness\" sensation in nature. Pt admits to using cocaine last night and chasing it with red bull. She has no other complaints at this time. Upon evaluation in ED pt appears drowsy, dozing off during the exam. She denies headache, nausea, emesis, abdominal pain, chills, diaphoresis, fever, or any other pertinent sx or concerns.           Review of Systems   Constitutional: Negative for chills and fever.   HENT: Negative for nosebleeds.    Eyes: Negative for redness.   Respiratory: Negative for cough and shortness of breath.    Cardiovascular: Positive for chest pain.   Gastrointestinal: Negative for diarrhea and vomiting.   Genitourinary: Negative for dysuria and frequency.   Musculoskeletal: Negative for back pain and neck pain.   Skin: Negative for rash.   Neurological: Negative for seizures and syncope.       Past Medical History:   Diagnosis Date   • Allergic rhinitis    • Anxiety    • Contracted ligament     LUE and LLE   • Contracture of muscle 04/18/2016    MULTUPLE SITES, WILL CONT BACLOFEN AND BOTOX   • Cramp in muscle    • Depression    • Heart murmur    • High blood pressure    • Hypertension    • Meningitis 01/08/2016    THIS OCCURED AS A CHILD, BUT SHE HAS A SIGNIFICANT AMOUNT OF CONTRACTURES ON HER LEFT SIDE WILL SCHEDULE PHYSICAL THERAPY REFER TO PM&R START BACLOFEN    • Migraine headache    • Murmur, heart    • Recurrent major depressive disorder, in full remission (CMS/[facility]) 09/11/2017    DOING WELL ON TRINTELLIX SAYS IT KEEPS HER SMILING :)    • Rheumatic fever    • Tendinitis of hip, left 10/05/2017   • Trochanteric bursitis of left hip 10/05/2017       No Known Allergies    Past Surgical History:   Procedure Laterality Date   • OTHER SURGICAL HISTORY Left 1995    BROKEN BONES , LEFT ARM.  1988 & 1995    • OTHER " SURGICAL HISTORY      METAL IMPLANTS    • ULNAR COLLATERAL LIGAMENT REPAIR      ligaments released left arm   • WRIST SURGERY  1995    metal plate left wrist       Family History   Problem Relation Age of Onset   • Diabetes type II Mother    • Cancer Father    • Prostate cancer Father    • Stroke Other    • Heart disease Other        Social History     Socioeconomic History   • Marital status:      Spouse name: Not on file   • Number of children: Not on file   • Years of education: Not on file   • Highest education level: Not on file   Tobacco Use   • Smoking status: Former Smoker     Packs/day: 1.00     Years: 10.00     Pack years: 10.00     Types: Cigarettes     Start date: 3/21/2006   • Smokeless tobacco: Never Used   Vaping Use   • Vaping Use: Never used   Substance and Sexual Activity   • Alcohol use: Never   • Drug use: Not Currently     Comment: FORMER IN THE PAST    • Sexual activity: Defer         Objective   Physical Exam  Vitals and nursing note reviewed.   Constitutional:       General: She is not in acute distress.  HENT:      Head: Normocephalic and atraumatic.      Nose: Nose normal.      Mouth/Throat:      Mouth: Mucous membranes are moist.   Eyes:      General: No scleral icterus.  Cardiovascular:      Rate and Rhythm: Normal rate and regular rhythm.      Heart sounds: Normal heart sounds. No murmur heard.     Pulmonary:      Effort: No respiratory distress.      Breath sounds: Normal breath sounds.   Abdominal:      Palpations: Abdomen is soft.      Tenderness: There is no abdominal tenderness.      Hernia: No hernia is present.   Musculoskeletal:         General: No tenderness. Normal range of motion.      Cervical back: Normal range of motion and neck supple. No tenderness.      Right lower leg: No edema.      Left lower leg: No edema.   Skin:     General: Skin is warm and dry.   Neurological:      General: No focal deficit present.      Mental Status: She is alert. Mental status is at  baseline.      Sensory: No sensory deficit.      Motor: No weakness.   Psychiatric:         Behavior: Behavior normal.         Procedures         ED Course  ED Course as of Jul 04 1601 Fri Jul 02, 2021   0947 EKG:    Rhythm: NSR  Rate: 89  Intervals: normal  T-wave: normal  ST Segment: normal    EKG Comparison: 6/5/19 unchanged    Interpreted by me        [NL]      ED Course User Index  [NL] Elver Caicedo DO     Labs Reviewed   COMPREHENSIVE METABOLIC PANEL - Abnormal; Notable for the following components:       Result Value    Glucose 127 (*)     All other components within normal limits    Narrative:     GFR Normal >60  Chronic Kidney Disease <60  Kidney Failure <15     LIPASE - Abnormal; Notable for the following components:    Lipase 111 (*)     All other components within normal limits   BNP (IN-HOUSE) - Normal    Narrative:     Among patients with dyspnea, NT-proBNP is highly sensitive for the detection of acute congestive heart failure. In addition NT-proBNP of <300 pg/ml effectively rules out acute congestive heart failure with 99% negative predictive value.    Results may be falsely decreased if patient taking Biotin.     MAGNESIUM - Normal   CK TOTAL AND CKMB - Normal    Narrative:     CKMB results may be falsely decreased if patient taking Biotin.   CBC WITH AUTO DIFFERENTIAL - Normal   POCT TROPONIN I - Normal   RAINBOW DRAW    Narrative:     The following orders were created for panel order [place] Draw.  Procedure                               Abnormality         Status                     ---------                               -----------         ------                     Green Top (Gel)[[id number]]                                  Final result               Lavender Top[[id number]]                                     Final result               Gold Top - SST[[id number]]                                   Final result                 Please view results for these tests on the individual orders.   POCT  TROPONIN I   POCT TROPONIN-I WITH HOLD TUBE    Narrative:     The following orders were created for panel order POC Troponin I with Hold Tube.  Procedure                               Abnormality         Status                     ---------                               -----------         ------                     POC Troponin I[[id number]]                                                              HOLD Troponin-I Tube[[id number]]                             Final result                 Please view results for these tests on the individual orders.   CBC AND DIFFERENTIAL    Narrative:     The following orders were created for panel order CBC & Differential.  Procedure                               Abnormality         Status                     ---------                               -----------         ------                     CBC Auto Differential[[id number]]        Normal              Final result                 Please view results for these tests on the individual orders.   GREEN TOP   LAVENDER TOP   GOLD TOP - SST   HOLD ISTAT TROPONIN-I TUBE     No Radiology Exams Resulted Within Past 24 Hours      HEART SCORE  History: Moderately suspicious (1)  ECG: Normal (0)  Age: Less than 45 years (0)  Risk factors: 1-2 Risk Factors (1)  Troponin: normal (0)    This patient's HEART score is 2.    According to the HEART Score Study: Score (Risk of adverse cardiac event in the next 14 days)  Scores 0-3: (0.9-1.7%) In the HEART Score study, these patients were discharged home.  Scores 4-6: (12-16.6%)  In the HEART Score study, these patients were admitted to the hospital.  Scores ?7: (50-65%) In the HEART Score study, these patients were candidates for early invasive measures.   Final disposition is based on individual provider judgement and current clinical situation.                                      MDM   46-year-old female patient presents to the emergency department with complaint of chest pain after using cocaine  and drinking red bull.  At time of exam patient states her pain has essentially resolved.  Patient's cardiac work-up does not show any evidence for an emergent cardiac abnormality.  Patient is stable for discharge with recommended outpatient follow-up.    Final diagnoses:   Chest pain of uncertain etiology       Documentation assistance provided by viktor Schmid.  Information recorded by the scribe was done at my direction and has been verified and validated by me.     Yecenia Schmid  07/02/21 0942       Elver Caicedo DO  07/04/21 9319

## 2021-07-04 LAB — QT INTERVAL: 376 MS

## 2021-07-26 NOTE — TELEPHONE ENCOUNTER
Caller: Mallory Mata    Relationship: Self    Best call back number: [phone]    Medication needed:   Requested Prescriptions     Pending Prescriptions Disp Refills   • baclofen (LIORESAL) 20 MG tablet       Sig: Take 1 tablet by mouth 3 (Three) Times a Day.   • topiramate (TOPAMAX) 50 MG tablet       Sig: Take 2 tablets by mouth.       When do you need the refill by: ASAP    Does the patient have less than a 3 day supply:  [x] Yes  [] No    What is the patient's preferred pharmacy: [address] [phone] [facility] [phone] [facility]     PATIENT STATED THAT SHE IS NOT HAVING ANY PAIN ANYMORE SO SHE STOPPED TAKING THE GABAPENTIN. PLEASE CALL PATIENT TO ADVISE ON REFILLS.

## 2021-07-27 ENCOUNTER — TELEPHONE (OUTPATIENT)
Dept: INTERNAL MEDICINE | Facility: CLINIC | Age: 46
End: 2021-07-27

## 2021-07-27 RX ORDER — TOPIRAMATE 50 MG/1
100 TABLET, FILM COATED ORAL 2 TIMES DAILY
Qty: 60 TABLET | Refills: 0 | Status: SHIPPED | OUTPATIENT
Start: 2021-07-27 | End: 2021-07-30

## 2021-07-27 RX ORDER — BACLOFEN 20 MG/1
20 TABLET ORAL 3 TIMES DAILY
Qty: 90 TABLET | Refills: 0 | Status: SHIPPED | OUTPATIENT
Start: 2021-07-27 | End: 2021-09-22

## 2021-07-29 ENCOUNTER — TELEPHONE (OUTPATIENT)
Dept: INTERNAL MEDICINE | Facility: CLINIC | Age: 46
End: 2021-07-29

## 2021-07-29 NOTE — TELEPHONE ENCOUNTER
[facility] [id number] [place] [address] [facility] [place] - [phone]  - [phone]      Pharmacy representative name: LITTLE    Pharmacy representative phone number: [phone]    What medication are you calling in regards to:   topiramate (TOPAMAX) 50 MG tablet  100 mg, 2 Times Daily 0 ordered         What question does the pharmacy have: WHAT IS THE DESIRE DOSE DO TO INCREASE AND HOW MANY PILLS ARE NEEDED PER DAY    Who is the provider that prescribed the medication: DR SEVILLA

## 2021-07-30 RX ORDER — TOPIRAMATE 50 MG/1
100 TABLET, FILM COATED ORAL 2 TIMES DAILY
Qty: 120 TABLET | Refills: 2 | Status: SHIPPED | OUTPATIENT
Start: 2021-07-30 | End: 2021-08-02 | Stop reason: SDUPTHER

## 2021-08-02 RX ORDER — NAPROXEN 500 MG/1
500 TABLET ORAL 2 TIMES DAILY WITH MEALS
Qty: 60 TABLET | Refills: 0 | Status: SHIPPED | OUTPATIENT
Start: 2021-08-02 | End: 2021-09-01

## 2021-08-02 RX ORDER — TOPIRAMATE 50 MG/1
100 TABLET, FILM COATED ORAL 2 TIMES DAILY
Qty: 120 TABLET | Refills: 2 | Status: SHIPPED | OUTPATIENT
Start: 2021-08-02 | End: 2021-11-02 | Stop reason: SDUPTHER

## 2021-08-02 RX ORDER — TOPIRAMATE 25 MG/1
25 TABLET ORAL 2 TIMES DAILY
Qty: 180 TABLET | Refills: 0 | Status: SHIPPED | OUTPATIENT
Start: 2021-08-02 | End: 2021-08-02 | Stop reason: SDUPTHER

## 2021-08-02 RX ORDER — GABAPENTIN 300 MG/1
300 CAPSULE ORAL 3 TIMES DAILY
Qty: 90 CAPSULE | Refills: 0 | Status: SHIPPED | OUTPATIENT
Start: 2021-08-02 | End: 2021-11-02 | Stop reason: SDUPTHER

## 2021-08-02 RX ORDER — NAPROXEN 500 MG/1
500 TABLET ORAL 2 TIMES DAILY WITH MEALS
Qty: 60 TABLET | Refills: 0 | Status: SHIPPED | OUTPATIENT
Start: 2021-08-02 | End: 2021-08-02 | Stop reason: SDUPTHER

## 2021-08-02 NOTE — TELEPHONE ENCOUNTER
Caller: Mallory Mata    Relationship: Self    Best call back number: PATIENT COULD NOT STAY ON THE PHONE TO VERIFY.    Medication needed:   Requested Prescriptions     Pending Prescriptions Disp Refills   • gabapentin (NEURONTIN) 300 MG capsule       Sig: Take 1 capsule by mouth 3 (Three) Times a Day.   • topiramate (TOPAMAX) 50 MG tablet 120 tablet 2     Sig: Take 2 tablets by mouth 2 (Two) Times a Day for 30 days.   • naproxen (NAPROSYN) 500 MG tablet 60 tablet 0     Sig: Take 1 tablet by mouth 2 (Two) Times a Day With Meals for 30 days.       When do you need the refill by: ASAP    Does the patient have less than a 3 day supply:  [x] Yes  [] No    What is the patient's preferred pharmacy:      [address] - [phone]  - [phone]   [phone]    PATIENT REQUESTED A REFILL MESSAGE, HOWEVER, SHE WAS UNABLE TO STAY ON THE PHONE TO VERIFY PHONE NUMBER AND PHARMACY DUE TO WORK BREAK ENDING. PATIENT WAS NOT ABLE TO BE INFORMED ABOUT THE TELEPHONE ENCOUNTER 48 HOUR WINDOW DUE TO PATIENT NEEDING TO GET OFF THE PHONE.

## 2021-09-22 RX ORDER — BACLOFEN 20 MG/1
TABLET ORAL
Qty: 90 TABLET | Refills: 0 | Status: SHIPPED | OUTPATIENT
Start: 2021-09-22 | End: 2021-11-02 | Stop reason: SDUPTHER

## 2021-09-23 RX ORDER — GABAPENTIN 300 MG/1
300 CAPSULE ORAL 3 TIMES DAILY
Qty: 90 CAPSULE | Refills: 0 | Status: CANCELLED | OUTPATIENT
Start: 2021-09-23

## 2021-09-23 NOTE — TELEPHONE ENCOUNTER
Caller: Mallory Mata    Relationship: Self    Medication requested (name and dosage):   baclofen (LIORESAL) 20 MG tablet  gabapentin (NEURONTIN) 300 MG capsule - WRITTEN PRESCRIPTION NEEDED     [facility] [address] [facility] [phone]  - [phone] [facility]  [phone]     Best call back number: [phone] - PLEASE CALL PATIENT WHEN WRITTEN PRESCRIPTION IS AVAILABLE     PATIENT HAS A MEDICATION REVIEW APPOINTMENT SCHEDULED FOR 11/2/21    Does the patient have less than a 3 day supply:  [x] Yes  [] No    Annie Holcomb Rep   09/23/21 12:51 EDT

## 2021-09-24 RX ORDER — BACLOFEN 20 MG/1
20 TABLET ORAL 3 TIMES DAILY
Qty: 90 TABLET | Refills: 0 | OUTPATIENT
Start: 2021-09-24

## 2021-10-04 ENCOUNTER — OFFICE VISIT (OUTPATIENT)
Dept: INTERNAL MEDICINE | Facility: CLINIC | Age: 46
End: 2021-10-04

## 2021-10-04 VITALS
WEIGHT: 164.8 LBS | SYSTOLIC BLOOD PRESSURE: 140 MMHG | TEMPERATURE: 97.3 F | OXYGEN SATURATION: 100 % | BODY MASS INDEX: 26.6 KG/M2 | DIASTOLIC BLOOD PRESSURE: 90 MMHG | HEART RATE: 120 BPM

## 2021-10-04 DIAGNOSIS — M25.559 HIP PAIN: Primary | ICD-10-CM

## 2021-10-04 PROCEDURE — 99213 OFFICE O/P EST LOW 20 MIN: CPT | Performed by: PHYSICIAN ASSISTANT

## 2021-10-04 NOTE — ASSESSMENT & PLAN NOTE
Will get x-ray of hip. X-ray in office currently down will have to go to [facility] for walk-in x-ray at this time.  If x-ray normal symptoms could be related to bruising of bone.  Continue conservative treatment.  Will add Voltaren gel.  Call for any concerns or questions.

## 2021-10-04 NOTE — PROGRESS NOTES
Chief Complaint  Hip Pain (left hip, sx started two weeks ago, tender to the touch, difficult to walk)    Subjective          Mallory Mata presents to [facility]  Hip pain- symptoms started a couple weeks ago after falling at [employer].  Patient has been taking naproxen but that does not seem to be helping.  Pain is worse at nighttime.  She has been out of her gabapentin for a week now.        Objective   Vital Signs:   /90   Pulse 120   Temp 97.3 °F (36.3 °C) (Temporal)   Wt 74.8 kg (164 lb 12.8 oz)   SpO2 100%   BMI 26.60 kg/m²     Physical Exam  Vitals reviewed.   Constitutional:       Appearance: Normal appearance. She is well-developed.   HENT:      Head: Normocephalic and atraumatic.   Eyes:      Conjunctiva/sclera: Conjunctivae normal.      Pupils: Pupils are equal, round, and reactive to light.   Cardiovascular:      Rate and Rhythm: Normal rate and regular rhythm.      Heart sounds: No murmur heard.   No friction rub. No gallop.    Pulmonary:      Effort: Pulmonary effort is normal.      Breath sounds: Normal breath sounds. No wheezing or rhonchi.   Musculoskeletal:      Comments: L greater trochanter TTP, some pain with ROM, no skin changes   Skin:     General: Skin is warm and dry.   Neurological:      Mental Status: She is alert and oriented to person, place, and time.      Cranial Nerves: No cranial nerve deficit.   Psychiatric:         Mood and Affect: Mood and affect normal.         Behavior: Behavior normal.         Thought Content: Thought content normal.         Judgment: Judgment normal.        Result Review :          Procedures      Assessment and Plan    Diagnoses and all orders for this visit:    1. Hip pain (Primary)  Assessment & Plan:  Will get x-ray of hip. X-ray in office currently down will have to go to [facility] for walk-in x-ray at this time.  If x-ray normal symptoms could be related to bruising of bone.  Continue conservative  treatment.  Will add Voltaren gel.  Call for any concerns or questions.    Orders:  -     XR Hip With or Without Pelvis 2 - 3 View Left; Future    Other orders  -     Diclofenac Sodium (VOLTAREN) 1 % gel gel; Apply 4 g topically to the appropriate area as directed 4 (Four) Times a Day As Needed (hip pain) for up to 7 days.  Dispense: 100 g; Refill: 0            Follow Up   Return for Next scheduled follow up.  Patient was given instructions and counseling regarding her condition or for health maintenance advice. Please see specific information pulled into the AVS if appropriate.

## 2021-10-19 ENCOUNTER — HOSPITAL ENCOUNTER (OUTPATIENT)
Dept: GENERAL RADIOLOGY | Facility: HOSPITAL | Age: 46
Discharge: HOME | End: 2021-10-19
Admitting: PHYSICIAN ASSISTANT

## 2021-10-19 DIAGNOSIS — M25.559 HIP PAIN: ICD-10-CM

## 2021-10-19 PROCEDURE — 73502 X-RAY EXAM HIP UNI 2-3 VIEWS: CPT

## 2021-10-20 ENCOUNTER — TELEPHONE (OUTPATIENT)
Dept: INTERNAL MEDICINE | Facility: CLINIC | Age: 46
End: 2021-10-20

## 2021-11-02 ENCOUNTER — TELEPHONE (OUTPATIENT)
Dept: INTERNAL MEDICINE | Facility: CLINIC | Age: 46
End: 2021-11-02

## 2021-11-02 ENCOUNTER — OFFICE VISIT (OUTPATIENT)
Dept: INTERNAL MEDICINE | Facility: CLINIC | Age: 46
End: 2021-11-02

## 2021-11-02 VITALS
HEART RATE: 98 BPM | TEMPERATURE: 97.6 F | DIASTOLIC BLOOD PRESSURE: 72 MMHG | HEIGHT: 66 IN | OXYGEN SATURATION: 98 % | SYSTOLIC BLOOD PRESSURE: 128 MMHG | BODY MASS INDEX: 27.48 KG/M2 | RESPIRATION RATE: 14 BRPM | WEIGHT: 171 LBS

## 2021-11-02 DIAGNOSIS — F41.9 ANXIETY: ICD-10-CM

## 2021-11-02 DIAGNOSIS — M70.62 TROCHANTERIC BURSITIS OF LEFT HIP: Primary | ICD-10-CM

## 2021-11-02 DIAGNOSIS — Z72.0 TOBACCO ABUSE: ICD-10-CM

## 2021-11-02 DIAGNOSIS — F33.42 MAJOR DEPRESSIVE DISORDER, RECURRENT EPISODE, IN FULL REMISSION (HCC): ICD-10-CM

## 2021-11-02 DIAGNOSIS — M62.838 MUSCLE SPASTICITY: ICD-10-CM

## 2021-11-02 DIAGNOSIS — Z79.899 LONG-TERM USE OF HIGH-RISK MEDICATION: ICD-10-CM

## 2021-11-02 PROBLEM — G43.909 MIGRAINE: Status: ACTIVE | Noted: 2021-11-02

## 2021-11-02 PROBLEM — I10 HYPERTENSION: Status: ACTIVE | Noted: 2021-11-02

## 2021-11-02 PROBLEM — J30.9 ALLERGIC RHINITIS: Status: ACTIVE | Noted: 2021-11-02

## 2021-11-02 PROBLEM — M76.899 ENTHESOPATHY OF HIP REGION: Status: ACTIVE | Noted: 2017-10-05

## 2021-11-02 PROBLEM — F32.A DEPRESSION: Status: ACTIVE | Noted: 2021-11-02

## 2021-11-02 PROBLEM — R01.1 HEART MURMUR: Status: ACTIVE | Noted: 2021-11-02

## 2021-11-02 PROBLEM — F32.A DEPRESSION: Status: RESOLVED | Noted: 2021-11-02 | Resolved: 2021-11-02

## 2021-11-02 LAB
AMPHET+METHAMPHET UR QL: NEGATIVE
AMPHETAMINE INTERNAL CONTROL: ABNORMAL
AMPHETAMINES UR QL: NEGATIVE
BARBITURATE INTERNAL CONTROL: ABNORMAL
BARBITURATES UR QL SCN: NEGATIVE
BENZODIAZ UR QL SCN: NEGATIVE
BENZODIAZEPINE INTERNAL CONTROL: ABNORMAL
BUPRENORPHINE INTERNAL CONTROL: ABNORMAL
BUPRENORPHINE SERPL-MCNC: NEGATIVE NG/ML
CANNABINOIDS SERPL QL: POSITIVE
COCAINE INTERNAL CONTROL: ABNORMAL
COCAINE UR QL: POSITIVE
EXPIRATION DATE: ABNORMAL
Lab: ABNORMAL
MDMA (ECSTASY) INTERNAL CONTROL: ABNORMAL
MDMA UR QL SCN: NEGATIVE
METHADONE INTERNAL CONTROL: ABNORMAL
METHADONE UR QL SCN: NEGATIVE
METHAMPHETAMINE INTERNAL CONTROL: ABNORMAL
OPIATES INTERNAL CONTROL: ABNORMAL
OPIATES UR QL: NEGATIVE
OXYCODONE INTERNAL CONTROL: ABNORMAL
OXYCODONE UR QL SCN: NEGATIVE
PCP UR QL SCN: NEGATIVE
PHENCYCLIDINE INTERNAL CONTROL: ABNORMAL
THC INTERNAL CONTROL: ABNORMAL

## 2021-11-02 PROCEDURE — 80305 DRUG TEST PRSMV DIR OPT OBS: CPT | Performed by: INTERNAL MEDICINE

## 2021-11-02 PROCEDURE — G0008 ADMIN INFLUENZA VIRUS VAC: HCPCS | Performed by: INTERNAL MEDICINE

## 2021-11-02 PROCEDURE — 1170F FXNL STATUS ASSESSED: CPT | Performed by: INTERNAL MEDICINE

## 2021-11-02 PROCEDURE — 90686 IIV4 VACC NO PRSV 0.5 ML IM: CPT | Performed by: INTERNAL MEDICINE

## 2021-11-02 PROCEDURE — 1160F RVW MEDS BY RX/DR IN RCRD: CPT | Performed by: INTERNAL MEDICINE

## 2021-11-02 PROCEDURE — 99214 OFFICE O/P EST MOD 30 MIN: CPT | Performed by: INTERNAL MEDICINE

## 2021-11-02 PROCEDURE — G0439 PPPS, SUBSEQ VISIT: HCPCS | Performed by: INTERNAL MEDICINE

## 2021-11-02 RX ORDER — FLUTICASONE PROPIONATE 50 MCG
2 SPRAY, SUSPENSION (ML) NASAL DAILY
Qty: 11.1 ML | Refills: 0 | Status: SHIPPED | OUTPATIENT
Start: 2021-11-02 | End: 2022-01-03

## 2021-11-02 RX ORDER — MONTELUKAST SODIUM 10 MG/1
10 TABLET ORAL DAILY
Qty: 90 TABLET | Refills: 1 | Status: SHIPPED | OUTPATIENT
Start: 2021-11-02 | End: 2022-04-22

## 2021-11-02 RX ORDER — BACLOFEN 20 MG/1
20 TABLET ORAL 3 TIMES DAILY
Qty: 90 TABLET | Refills: 0 | Status: SHIPPED | OUTPATIENT
Start: 2021-11-02 | End: 2022-01-03

## 2021-11-02 RX ORDER — VORTIOXETINE 20 MG/1
TABLET, FILM COATED ORAL
COMMUNITY
Start: 2021-10-21

## 2021-11-02 RX ORDER — TOPIRAMATE 50 MG/1
50 TABLET, FILM COATED ORAL 2 TIMES DAILY
Qty: 180 TABLET | Refills: 2 | Status: SHIPPED | OUTPATIENT
Start: 2021-11-02 | End: 2022-10-18

## 2021-11-02 RX ORDER — TOPIRAMATE 25 MG/1
TABLET ORAL
COMMUNITY
Start: 2021-08-04 | End: 2021-11-02

## 2021-11-02 RX ORDER — NAPROXEN 500 MG/1
500 TABLET ORAL 2 TIMES DAILY WITH MEALS
Qty: 120 TABLET | Refills: 2 | Status: SHIPPED | OUTPATIENT
Start: 2021-11-02 | End: 2022-08-16

## 2021-11-02 RX ORDER — BREXPIPRAZOLE 1 MG/1
TABLET ORAL
COMMUNITY
Start: 2021-10-18 | End: 2022-02-04

## 2021-11-02 RX ORDER — GABAPENTIN 300 MG/1
300 CAPSULE ORAL 3 TIMES DAILY
Qty: 90 CAPSULE | Refills: 0 | Status: SHIPPED | OUTPATIENT
Start: 2021-11-02 | End: 2021-11-10

## 2021-11-02 NOTE — PROGRESS NOTES
The ABCs of the Annual Wellness Visit  Subsequent Medicare Wellness Visit    Chief Complaint   Patient presents with   • Follow-up   • Hip Pain     Left, From fall       Subjective    History of Present Illness:  Mallory Mata is a 46 y.o. female who presents for a Subsequent Medicare Wellness Visit.    The following portions of the patient's history were reviewed and   updated as appropriate: allergies, current medications, past family history, past medical history, past social history, past surgical history and problem list.    Compared to one year ago, the patient feels her physical   health is worse.    Compared to one year ago, the patient feels her mental   health is the same.    Recent Hospitalizations:  She was not admitted to the hospital during the last year.       Current Medical Providers:  Patient Care Team:  Tiffany Salomon MD as PCP - General (Internal Medicine)    Outpatient Medications Prior to Visit   Medication Sig Dispense Refill   • acetaminophen (TYLENOL) 650 MG 8 hr tablet Take 650 mg by mouth Every 8 (Eight) Hours As Needed for Mild Pain .     • B Complex Vitamins (VITAMIN B COMPLEX PO) Take  by mouth Daily.     • busPIRone (BUSPAR) 15 MG tablet Take 15 mg by mouth 3 (Three) Times a Day.     • Multiple Vitamins-Minerals (HAIR SKIN AND NAILS FORMULA) tablet Take  by mouth Daily.     • Rexulti 1 MG tablet      • baclofen (LIORESAL) 20 MG tablet TAKE 1 TABLET BY MOUTH THREE TIMES DAILY 90 tablet 0   • fluticasone (FLONASE) 50 MCG/ACT nasal spray 2 sprays into the nostril(s) as directed by provider Daily.     • gabapentin (NEURONTIN) 300 MG capsule Take 1 capsule by mouth 3 (Three) Times a Day. 90 capsule 0   • montelukast (SINGULAIR) 10 MG tablet Take 10 mg by mouth daily.     • Trintellix 20 MG tablet      • Brexpiprazole (REXULTI) 0.5 MG tablet Take 1 mg by mouth Every Night.     • mirtazapine (REMERON) 15 MG tablet Take 30 mg by mouth Every Night.     • SUMAtriptan (IMITREX) 100 MG  "tablet Take 100 mg by mouth Every 2 (Two) Hours As Needed for Migraine. Take one tablet at onset of headache. May repeat dose one time in 2 hours if headache not relieved.     • topiramate (TOPAMAX) 25 MG tablet      • topiramate (TOPAMAX) 50 MG tablet Take 2 tablets by mouth 2 (Two) Times a Day for 30 days. 120 tablet 2   • varenicline (CHANTIX) 1 MG tablet Take 1 mg by mouth 2 (Two) Times a Day.     • Vortioxetine HBr (TRINTELLIX) 10 MG tablet Take 20 mg by mouth Daily.       No facility-administered medications prior to visit.       No opioid medication identified on active medication list. I have reviewed chart for other potential  high risk medication/s and harmful drug interactions in the elderly.          Aspirin is not on active medication list.  not indicated.    Patient Active Problem List   Diagnosis   • Dystonia   • Muscle spasticity   • Neck pain   • Weakness, right upper extremity   • Myofascial muscle pain   • Hip pain   • Allergic rhinitis   • Anxiety   • Contracture of muscle of multiple sites   • Enthesopathy of hip region   • Heart murmur   • Hypertension   • Major depressive disorder, recurrent episode, in full remission (HCC)   • Migraine     Advance Care Planning  Advance Directive is not on file.  ACP discussion was held with the patient during this visit. Patient does not have an advance directive, information provided. her son would help          Objective    Vitals:    11/02/21 1350   BP: 128/72   Pulse: 98   Resp: 14   Temp: 97.6 °F (36.4 °C)   SpO2: 98%   Weight: 77.6 kg (171 lb)   Height: 167.6 cm (66\")     BMI Readings from Last 1 Encounters:   11/02/21 27.60 kg/m²   BMI is above normal parameters. Recommendations include: exercise counseling    Does the patient have evidence of cognitive impairment? No    Physical Exam            HEALTH RISK ASSESSMENT    Smoking Status:  Social History     Tobacco Use   Smoking Status Former Smoker   • Packs/day: 1.00   • Years: 10.00   • Pack years: " 10.00   • Types: Cigarettes   • Start date: 3/21/2006   Smokeless Tobacco Never Used     Alcohol Consumption:  Social History     Substance and Sexual Activity   Alcohol Use Never     Fall Risk Screen:    NAADI Fall Risk Assessment was completed, and patient is at HIGH risk for falls. Assessment completed on:11/2/2021    Depression Screening:  PHQ-2/PHQ-9 Depression Screening 11/2/2021   Little interest or pleasure in doing things 0   Feeling down, depressed, or hopeless 0   Trouble falling or staying asleep, or sleeping too much 0   Feeling tired or having little energy 3   Poor appetite or overeating 0   Feeling bad about yourself - or that you are a failure or have let yourself or your family down 0   Trouble concentrating on things, such as reading the newspaper or watching television 0   Moving or speaking so slowly that other people could have noticed. Or the opposite - being so fidgety or restless that you have been moving around a lot more than usual 0   Thoughts that you would be better off dead, or of hurting yourself in some way 0   Total Score 3   If you checked off any problems, how difficult have these problems made it for you to do your work, take care of things at home, or get along with other people? Not difficult at all       Health Habits and Functional and Cognitive Screening:  Functional & Cognitive Status 11/2/2021   Do you have difficulty preparing food and eating? No   Do you have difficulty bathing yourself, getting dressed or grooming yourself? No   Do you have difficulty using the toilet? No   Do you have difficulty moving around from place to place? No   Do you have trouble with steps or getting out of a bed or a chair? No   Current Diet Well Balanced Diet   Dental Exam Not up to date   Eye Exam Up to date   Exercise (times per week) 0 times per week   Current Exercises Include No Regular Exercise   Do you need help using the phone?  No   Are you deaf or do you have serious difficulty  hearing?  No   Do you need help with transportation? No   Do you need help shopping? No   Do you need help preparing meals?  No   Do you need help with housework?  No   Do you need help with laundry? No   Do you need help taking your medications? No   Do you need help managing money? No   Do you ever drive or ride in a car without wearing a seat belt? No   Have you felt unusual stress, anger or loneliness in the last month? No   Who do you live with? Spouse   If you need help, do you have trouble finding someone available to you? No   Have you been bothered in the last four weeks by sexual problems? No   Do you have difficulty concentrating, remembering or making decisions? No       Age-appropriate Screening Schedule:  Refer to the list below for future screening recommendations based on patient's age, sex and/or medical conditions. Orders for these recommended tests are listed in the plan section. The patient has been provided with a written plan.    Health Maintenance   Topic Date Due   • TDAP/TD VACCINES (1 - Tdap) Never done   • PAP SMEAR  05/28/2022   • INFLUENZA VACCINE  Completed              Assessment/Plan   CMS Preventative Services Quick Reference  Risk Factors Identified During Encounter  Immunizations Discussed/Encouraged (specific Immunizations; Influenza  The above risks/problems have been discussed with the patient.  Follow up actions/plans if indicated are seen below in the Assessment/Plan Section.  Pertinent information has been shared with the patient in the After Visit Summary.    Diagnoses and all orders for this visit:    1. Trochanteric bursitis of left hip (Primary)  Comments:  offered injection, but states that she wants to talk to Dr. Carrasquillo first  Orders:  -     Ambulatory Referral to Orthopedic Surgery    2. Muscle spasticity  Comments:  will refer to pain management  Orders:  -     Ambulatory Referral to Physical Medicine Rehab  -     gabapentin (NEURONTIN) 300 MG capsule; Take 1 capsule  by mouth 3 (Three) Times a Day.  Dispense: 90 capsule; Refill: 0  -     POC Urine Drug Screen [facility]    3. Major depressive disorder, recurrent episode, in full remission (HCC)  Assessment & Plan:  Doing well, cont with counseling and trintellix      4. Anxiety    5. Tobacco abuse  Comments:  is considering using chantix to quit    6. Long-term use of high-risk medication  -     POC Urine Drug Screen [facility]    Other orders  -     topiramate (TOPAMAX) 50 MG tablet; Take 1 tablet by mouth 2 (Two) Times a Day for 30 days.  Dispense: 180 tablet; Refill: 2  -     montelukast (SINGULAIR) 10 MG tablet; Take 1 tablet by mouth Daily.  Dispense: 90 tablet; Refill: 1  -     fluticasone (FLONASE) 50 MCG/ACT nasal spray; 2 sprays into the nostril(s) as directed by provider Daily.  Dispense: 11.1 mL; Refill: 0  -     baclofen (LIORESAL) 20 MG tablet; Take 1 tablet by mouth 3 (Three) Times a Day.  Dispense: 90 tablet; Refill: 0  -     naproxen (Naprosyn) 500 MG tablet; Take 1 tablet by mouth 2 (Two) Times a Day With Meals.  Dispense: 120 tablet; Refill: 2  -     FluLaval/Fluarix/Fluzone >6 Months      Follow Up:   No follow-ups on file.     An After Visit Summary and PPPS were made available to the patient.

## 2021-11-02 NOTE — TELEPHONE ENCOUNTER
Attempted to contact patient regarding controlled substance agreement and urine drug screen. LMTCB

## 2021-11-02 NOTE — PROGRESS NOTES
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  She really enjoys her job    States that she slipped and fell when she was in [employer]  She was walking in [employer] and she fell on the left part of her hip  Reviewed xray done in the     Her hip is still giving her a lot of trouble  She is getting a lot of stiffness    States that her anxiety has been high lately, but 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   Constitutional:       Appearance: Normal appearance. She is well-developed.   HENT:      Head: Normocephalic and atraumatic.      Right Ear: External ear normal.      Left Ear: External ear normal.   Eyes:      Conjunctiva/sclera: Conjunctivae normal.      Pupils: Pupils are equal, round, and reactive to light.   Cardiovascular:      Rate and Rhythm: Normal rate and regular rhythm.      Heart sounds: No murmur heard.  No friction rub. No gallop.    Pulmonary:      Effort: Pulmonary effort is normal.      Breath sounds: Normal breath sounds. No wheezing or rhonchi.   Musculoskeletal:      Comments: Left arm and leg contractures; left trochanter with tenderness to palpation   Skin:     General: Skin is warm and dry.   Neurological:      " Mental Status: She is alert and oriented to person, place, and time.      Cranial Nerves: No cranial nerve deficit.   Psychiatric:         Mood and Affect: Affect normal.         Behavior: Behavior normal.         Thought Content: Thought content normal.        Result Review :     Common labs    Common Labsle 7/2/21 7/2/21    0747 0747   Glucose  127 (A)   BUN  9   Creatinine  0.69   eGFR African Am  112   Sodium  138   Potassium  3.8   Chloride  103   Calcium  9.5   Albumin  3.90   Total Bilirubin  0.2   Alkaline Phosphatase  94   AST (SGOT)  15   ALT (SGPT)  12   WBC 7.23    Hemoglobin 12.8    Hematocrit 39.9    Platelets 300    (A) Abnormal value               Procedures      Assessment and Plan    Diagnoses and all orders for this visit:    1. Trochanteric bursitis of left hip (Primary)  Comments:  offered injection, but states that she wants to talk to Dr. Carrasquillo first  Orders:  -     Ambulatory Referral to Orthopedic Surgery    2. Muscle spasticity  Comments:  will refer to pain management  Orders:  -     Ambulatory Referral to Physical Medicine Rehab    3. Major depressive disorder, recurrent episode, in full remission (HCC)  Assessment & Plan:  Doing well, cont with counseling and trintellix      4. Anxiety    5. Tobacco abuse  Comments:  is considering using chantix to quit            Follow Up   No follow-ups on file.  Patient was given instructions and counseling regarding her condition or for health maintenance advice. Please see specific information pulled into the AVS if appropriate.

## 2021-11-10 ENCOUNTER — OFFICE VISIT (OUTPATIENT)
Dept: NEUROSURGERY | Facility: CLINIC | Age: 46
End: 2021-11-10

## 2021-11-10 VITALS
HEIGHT: 66 IN | BODY MASS INDEX: 26.42 KG/M2 | SYSTOLIC BLOOD PRESSURE: 121 MMHG | WEIGHT: 164.4 LBS | DIASTOLIC BLOOD PRESSURE: 80 MMHG

## 2021-11-10 DIAGNOSIS — R20.2 PARESTHESIA OF RIGHT UPPER EXTREMITY: ICD-10-CM

## 2021-11-10 DIAGNOSIS — R20.0 ANESTHESIA: ICD-10-CM

## 2021-11-10 DIAGNOSIS — M54.2 CERVICALGIA: ICD-10-CM

## 2021-11-10 DIAGNOSIS — M50.30 DDD (DEGENERATIVE DISC DISEASE), CERVICAL: Primary | ICD-10-CM

## 2021-11-10 PROCEDURE — 99214 OFFICE O/P EST MOD 30 MIN: CPT | Performed by: PHYSICIAN ASSISTANT

## 2021-11-10 NOTE — PROGRESS NOTES
"Chief Complaint  Neck Pain    Subjective          Mallory Mata who is a 46 y.o. year old female who presents to [facility] for Evaluation of the Spine.     The patient complains of pain located in the Cervical Spine.  Patients states the pain has been present for 3 years.  The pain came on gradually.  The pain scaled level is 5-7/10.  The pain does note radiate. .  The pain is constant and waxing/waning and described as sharp and aching.  The pain is worse in the evening. Patient states nothing in particular makes the pain better.  Patient states using the right upper extremity, lifting makes the pain worse.    Associated Symptoms Include: Numbness and Tingling, down the right arm to the fingertips, especially the last 3 digits in the right hand.  Conservative Interventions Include: Physical Therapy that was somewhat effective., NSAIDs that were not very effective., Gabapentin that was somewhat effective., Muscle Relaxants that were somewhat effective. and cortisone injections in the right shoulder which helped somewhat.    Was this the result of an injury or accident?: No    History of Previous Spinal Surgery?: No     reports that she has been smoking cigarettes. She started smoking about 15 years ago. She has a 10.00 pack-year smoking history. She has never used smokeless tobacco.    Review of Systems   Musculoskeletal: Positive for arthralgias and neck pain.   Neurological: Positive for numbness.        Objective   Vital Signs:   /80 (BP Location: Right arm, Patient Position: Sitting)   Ht 167.6 cm (66\")   Wt 74.6 kg (164 lb 6.4 oz)   BMI 26.53 kg/m²       Physical Exam  Constitutional:       Appearance: Normal appearance. She is normal weight.   Neck:      Comments: Pain with ROM to the right, extension of the neck  Pulmonary:      Effort: Pulmonary effort is normal.   Musculoskeletal:         General: Tenderness (TTP right cervical paraspinals, trapezius, " and rhomboids) present.      Comments: Dillon's negative on right   Neurological:      General: No focal deficit present.      Mental Status: She is alert and oriented to person, place, and time. Mental status is at baseline.      Sensory: No sensory deficit.      Motor: No weakness.      Deep Tendon Reflexes: Reflexes normal.      Comments: Left arm is unable to be assessed due to chronic contracture   Psychiatric:         Mood and Affect: Mood normal.         Behavior: Behavior normal.        Neurologic Exam     Mental Status   Oriented to person, place, and time.        Result Review :   I have personally reviewed the radiology report for MRI of cervical spine without contrast from 10/15/2020 which shows at C3-C4 there is a disc bulge which is slightly paracentral to the right causing moderate right foraminal narrowing without central canal stenosis, at C5-C6 there is diffuse disc bulge causing mild bilateral neuroforaminal narrowing without central canal stenosis, there is also incidental finding of left paracentral disc protrusion at T1-T2.         Assessment and Plan    Diagnoses and all orders for this visit:    1. DDD (degenerative disc disease), cervical (Primary)    2. Cervicalgia  -     EMG & Nerve Conduction Test; Future    3. Paresthesia of right upper extremity  -     EMG & Nerve Conduction Test; Future    4. Anesthesia  -     EMG & Nerve Conduction Test; Future    She may benefit from having NCV testing to determine C3 nerve vs other nerve pathology. I'm not convinced that her problem is arising from the cervical spine given mild findings on the MRI, however. She is agreeable to this and I will order today.    She may consider another round of physical therapy as it has been a while since she did this. She would like to defer at this time.    She may consider a trial of CESB at C3-C4 to assess benefit for this. Again, she would like to defer this at this time - she is concerned that with her  previous history of meningitis that there may be some unforeseen consequences to having epidural injections. However, if she decides she would like to consult pain management I would be happy to refer her. At any rate - I also feel she may benefit from a less invasive type of injection, like trigger point injections targeting the right cervical paraspinals, rhomboid, and trapezius muscles.    The patient was counseled on basic recommendations for the reduction and prevention of back, neck, or spine pain in association with spinal disorders, including: cessation/avoidance of nicotine use, maintenance of a healthy BMI and weight, focusing on building/maintaining core strength through core exercise, and avoidance of activities which worsen the pain. The patient will monitor for changes in symptoms and notify our clinic of these changes as needed.        Follow Up   Return in about 6 weeks (around 12/22/2021).  Patient was given instructions and counseling regarding her condition or for health maintenance advice. Please see specific information pulled into the AVS if appropriate.

## 2021-11-29 ENCOUNTER — OFFICE VISIT (OUTPATIENT)
Dept: ORTHOPEDIC SURGERY | Facility: CLINIC | Age: 46
End: 2021-11-29

## 2021-11-29 VITALS — BODY MASS INDEX: 27.32 KG/M2 | WEIGHT: 170 LBS | HEIGHT: 66 IN | OXYGEN SATURATION: 99 % | HEART RATE: 120 BPM

## 2021-11-29 DIAGNOSIS — S79.912A INJURY OF LEFT HIP, INITIAL ENCOUNTER: Primary | ICD-10-CM

## 2021-11-29 PROCEDURE — 99213 OFFICE O/P EST LOW 20 MIN: CPT | Performed by: ORTHOPAEDIC SURGERY

## 2021-11-29 PROCEDURE — 20610 DRAIN/INJ JOINT/BURSA W/O US: CPT | Performed by: ORTHOPAEDIC SURGERY

## 2021-11-29 RX ADMIN — TRIAMCINOLONE ACETONIDE 40 MG: 40 INJECTION, SUSPENSION INTRA-ARTICULAR; INTRAMUSCULAR at 16:14

## 2021-11-29 RX ADMIN — LIDOCAINE HYDROCHLORIDE 9 ML: 10 INJECTION, SOLUTION INFILTRATION; PERINEURAL at 16:14

## 2021-12-01 RX ORDER — LIDOCAINE HYDROCHLORIDE 10 MG/ML
9 INJECTION, SOLUTION INFILTRATION; PERINEURAL
Status: COMPLETED | OUTPATIENT
Start: 2021-11-29 | End: 2021-11-29

## 2021-12-01 RX ORDER — TRIAMCINOLONE ACETONIDE 40 MG/ML
40 INJECTION, SUSPENSION INTRA-ARTICULAR; INTRAMUSCULAR
Status: COMPLETED | OUTPATIENT
Start: 2021-11-29 | End: 2021-11-29

## 2021-12-14 ENCOUNTER — PROCEDURE VISIT (OUTPATIENT)
Dept: NEUROLOGY | Facility: CLINIC | Age: 46
End: 2021-12-14

## 2021-12-14 VITALS
WEIGHT: 166 LBS | SYSTOLIC BLOOD PRESSURE: 172 MMHG | BODY MASS INDEX: 26.06 KG/M2 | HEIGHT: 67 IN | DIASTOLIC BLOOD PRESSURE: 100 MMHG | HEART RATE: 140 BPM

## 2021-12-14 DIAGNOSIS — M54.2 CERVICALGIA: ICD-10-CM

## 2021-12-14 DIAGNOSIS — G56.03 BILATERAL CARPAL TUNNEL SYNDROME: Primary | ICD-10-CM

## 2021-12-14 DIAGNOSIS — R20.2 PARESTHESIA OF RIGHT UPPER EXTREMITY: ICD-10-CM

## 2021-12-14 DIAGNOSIS — G89.29 CHRONIC RIGHT SHOULDER PAIN: ICD-10-CM

## 2021-12-14 DIAGNOSIS — M25.511 CHRONIC RIGHT SHOULDER PAIN: ICD-10-CM

## 2021-12-14 DIAGNOSIS — R20.0 ANESTHESIA: ICD-10-CM

## 2021-12-14 PROCEDURE — 95909 NRV CNDJ TST 5-6 STUDIES: CPT | Performed by: PSYCHIATRY & NEUROLOGY

## 2021-12-14 PROCEDURE — 99202 OFFICE O/P NEW SF 15 MIN: CPT | Performed by: PSYCHIATRY & NEUROLOGY

## 2021-12-14 NOTE — ASSESSMENT & PLAN NOTE
I discussed with her that her right shoulder pain is not secondary to cervical radiculopathy.  She is not a surgical candidate.  I discussed with her it is musculoskeletal pain that she has right trapezius pain and shoulder pain.

## 2021-12-14 NOTE — ASSESSMENT & PLAN NOTE
Nerve conduction study is abnormal and shows electrophysiologic evidence for mild bilateral carpal tunnel syndrome.  I discussed with her that clinically and electrophysiologically her symptoms secondary to carpal tunnel syndrome on the right hand.  She has worn wrist splints in the past without success.  She is to follow-up with surgery to address her carpal tunnel syndrome.

## 2021-12-14 NOTE — PROGRESS NOTES
"Chief Complaint  Pain (BUE R>L), Numbness (BUE R>L), and Extremity Weakness (BUE R>L)    Subjective          Mallory Mata is a 46 y.o. female who presents to [facility]  History of Present Illness  46-year-old woman evaluated for right hand numbness greater than left hand numbness.  The hand numbness comes intermittently for the last 5 years.  She states that he gets numb and tingling she is doing activities such as typing.  He gets numb and tingly when she wakes up in the morning.  It involves the thumb, index and middle fingers predominantly.  Is also complaining of right shoulder pain.  She had rotator cuff injury in 2019.  She states that she had difficulty lifting her right shoulder and it was frozen.  Physical therapy helped it.  She continues to have right shoulder pain intermittently.  She had an MRI of the cervical spine was performed last year that did not show neuroforaminal stenosis to account for her symptoms.  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  Objective   Vital Signs:   /100   Pulse (!) 140   Ht 168.9 cm (66.5\")   Wt 75.3 kg (166 lb)   BMI 26.39 kg/m²     Physical Exam   She is alert, fluent, phasic, follows commands well.  Is no weakness of the right upper extremity on individual muscle testing.  Range of motion the right shoulder is normal.  Phalen sign is positive on the right hand.  Pressure to the right wrist with numbness and tingling in her hand.  There is pain in her right trapezius to palpation.        Assessment and Plan  Diagnoses and all orders for this visit:    1. Bilateral carpal tunnel syndrome (Primary)  Assessment & Plan:  Nerve conduction study is abnormal and shows electrophysiologic evidence for mild bilateral carpal tunnel syndrome.  I discussed with her that clinically and electrophysiologically her symptoms secondary to carpal tunnel syndrome on the right hand.  She has worn " wrist splints in the past without success.  She is to follow-up with surgery to address her carpal tunnel syndrome.      2. Cervicalgia  -     EMG & Nerve Conduction Test    3. Paresthesia of right upper extremity  -     EMG & Nerve Conduction Test    4. Anesthesia  -     EMG & Nerve Conduction Test    5. Chronic right shoulder pain  Assessment & Plan:  I discussed with her that her right shoulder pain is not secondary to cervical radiculopathy.  She is not a surgical candidate.  I discussed with her it is musculoskeletal pain that she has right trapezius pain and shoulder pain.          Nerve Conduction Study:  6 nerves     EMG:  Not done    Total time spent with the patient and coordinating patient care was 15 minutes.    Follow Up  No follow-ups on file.  Patient was given instructions and counseling regarding her condition or for health maintenance advice. Please see specific information pulled into the AVS if appropriate.

## 2021-12-28 ENCOUNTER — OFFICE VISIT (OUTPATIENT)
Dept: NEUROSURGERY | Facility: CLINIC | Age: 46
End: 2021-12-28

## 2021-12-28 VITALS
BODY MASS INDEX: 27.31 KG/M2 | HEIGHT: 67 IN | SYSTOLIC BLOOD PRESSURE: 105 MMHG | DIASTOLIC BLOOD PRESSURE: 65 MMHG | HEART RATE: 102 BPM | WEIGHT: 174 LBS

## 2021-12-28 DIAGNOSIS — G56.03 BILATERAL CARPAL TUNNEL SYNDROME: Primary | ICD-10-CM

## 2021-12-28 PROCEDURE — 99213 OFFICE O/P EST LOW 20 MIN: CPT | Performed by: PHYSICIAN ASSISTANT

## 2021-12-28 NOTE — PROGRESS NOTES
Patient being seen for today for No chief complaint on file.  .    Subjective    Mallory Mata is a 46 y.o. female that presents with No chief complaint on file.  .    HPI  Previously: Last seen on 11/10/2021 for complaints of right hand pain and numbness, there is an order for NCV testing of the bilateral upper extremities.    Today: She did complete the NCV/EMG testing for bilateral arms on 12/17/2021 which showed mild bilateral carpal tunnel syndrome, there was a mention of her being referred to Orthopedics for the right CTS, she reports this has not been done yet, but she is established with Dr. Carrasquillo for hip and shoulder issues. She has been wearing wrist brace which is somewhat helpful.     reports that she has been smoking cigarettes. She started smoking about 15 years ago. She has a 10.00 pack-year smoking history. She has never used smokeless tobacco.    Review of Systems   Musculoskeletal: Positive for myalgias (right wrist).   Neurological: Positive for weakness and numbness.       Objective   Vitals:    12/28/21 1604   BP: 105/65   Pulse: 102        Physical Exam  Constitutional:       Appearance: Normal appearance. She is normal weight.   Pulmonary:      Effort: Pulmonary effort is normal.   Musculoskeletal:         General: No tenderness.      Comments: Tinel's positive at right wrist   Neurological:      General: No focal deficit present.      Mental Status: She is alert and oriented to person, place, and time.      Sensory: No sensory deficit.      Motor: No weakness.      Deep Tendon Reflexes: Reflexes normal.   Psychiatric:         Mood and Affect: Mood normal.         Behavior: Behavior normal.          Result Review   I have reviewed the report for EMG/NCV testing for bilateral upper extremities from 12/17/2021 which shows mild bilateral carpal tunnel syndrome.     Assessment and Plan {CC Problem List  Visit Diagnosis  ROS  Review (Popup)  Health Maintenance  Quality  BestPractice   Medications  SmartSets  SnapShot Encounters  Media :23}   Diagnoses and all orders for this visit:    1. Bilateral carpal tunnel syndrome (Primary)  -     Ambulatory Referral to Orthopedic Surgery    She could consider physical therapy to assess benefit for the right wrist CTS. She is currently doing PT on her leg and would like to defer this for the wrist, she also reports she has done PT for the wrist in the past and it didn't help.    She would like to discuss CTS of the right wrist with Orthopedics to possibly consider treatments outside of surgery. I will place referral today.    We discussed the importance of smoking/nicotine cessation. Smoking/nicotine use has multiple health risks. In particular related to the spine, nicotine increases the incidence of lower back pain, speeds up the progression of degenerative disc disease and dramatically reduces healing after spine surgery (particularly a fusion operation).     The patient was counseled on basic recommendations for the reduction and prevention of back, neck, or spine pain in association with spinal disorders, including: cessation/avoidance of nicotine use, maintenance of a healthy BMI and weight, focusing on building/maintaining core strength through core exercise, and avoidance of activities which worsen the pain. The patient will monitor for changes in symptoms and notify our clinic of these changes as needed.    She will follow-up here PRN for failure to improve or worsening symptoms.    Follow Up {Instructions Charge Capture  Follow-up Communications :23}   Return if symptoms worsen or fail to improve.

## 2022-01-03 RX ORDER — BACLOFEN 20 MG/1
TABLET ORAL
Qty: 90 TABLET | Refills: 0 | Status: SHIPPED | OUTPATIENT
Start: 2022-01-03 | End: 2022-03-10

## 2022-01-03 RX ORDER — FLUTICASONE PROPIONATE 50 MCG
SPRAY, SUSPENSION (ML) NASAL
Qty: 16 G | Refills: 0 | Status: SHIPPED | OUTPATIENT
Start: 2022-01-03 | End: 2022-04-22

## 2022-01-10 ENCOUNTER — OFFICE VISIT (OUTPATIENT)
Dept: ORTHOPEDIC SURGERY | Facility: CLINIC | Age: 47
End: 2022-01-10

## 2022-01-10 VITALS — HEART RATE: 109 BPM | OXYGEN SATURATION: 99 % | BODY MASS INDEX: 27 KG/M2 | HEIGHT: 67 IN | WEIGHT: 172 LBS

## 2022-01-10 DIAGNOSIS — G56.03 BILATERAL CARPAL TUNNEL SYNDROME: Primary | ICD-10-CM

## 2022-01-10 PROCEDURE — 99213 OFFICE O/P EST LOW 20 MIN: CPT | Performed by: ORTHOPAEDIC SURGERY

## 2022-01-10 PROCEDURE — 20526 THER INJECTION CARP TUNNEL: CPT | Performed by: ORTHOPAEDIC SURGERY

## 2022-01-10 RX ORDER — TRIAMCINOLONE ACETONIDE 40 MG/ML
40 INJECTION, SUSPENSION INTRA-ARTICULAR; INTRAMUSCULAR
Status: COMPLETED | OUTPATIENT
Start: 2022-01-10 | End: 2022-01-10

## 2022-01-10 RX ORDER — BUPIVACAINE HYDROCHLORIDE 2.5 MG/ML
1 INJECTION, SOLUTION INFILTRATION; PERINEURAL
Status: COMPLETED | OUTPATIENT
Start: 2022-01-10 | End: 2022-01-10

## 2022-01-10 RX ADMIN — TRIAMCINOLONE ACETONIDE 40 MG: 40 INJECTION, SUSPENSION INTRA-ARTICULAR; INTRAMUSCULAR at 16:14

## 2022-01-10 RX ADMIN — BUPIVACAINE HYDROCHLORIDE 1 ML: 2.5 INJECTION, SOLUTION INFILTRATION; PERINEURAL at 16:14

## 2022-01-10 NOTE — PROGRESS NOTES
"Chief Complaint  Pain and Follow-up of the Left Hand and Pain and Follow-up of the Right Hand     Subjective      Mallory Mata presents to [facility] for an evaluation of bilateral hands. Patient complains of numbness and tingling in bilateral hands. Her right hand is worse than the left. She hasn't had any recent injury to her bilateral hands. She doesn't experience much symptoms in the left hand. Her index and thumb go numb the most. She has been using carpal tunnel braces.     No Known Allergies     Social History     Socioeconomic History   • Marital status:    Tobacco Use   • Smoking status: Current Every Day Smoker     Packs/day: 1.00     Years: 10.00     Pack years: 10.00     Types: Cigarettes     Start date: 3/21/2006   • Smokeless tobacco: Never Used   Vaping Use   • Vaping Use: Never used   Substance and Sexual Activity   • Alcohol use: Never   • Drug use: Not Currently     Comment: FORMER IN THE PAST    • Sexual activity: Defer        Review of Systems     Objective   Vital Signs:   Pulse 109   Ht 168.9 cm (66.5\")   Wt 78 kg (172 lb)   SpO2 99%   BMI 27.35 kg/m²       Physical Exam  Constitutional:       Appearance: Normal appearance. Patient is well-developed and normal weight.   HENT:      Head: Normocephalic.      Right Ear: Hearing and external ear normal.      Left Ear: Hearing and external ear normal.      Nose: Nose normal.   Eyes:      Conjunctiva/sclera: Conjunctivae normal.   Cardiovascular:      Rate and Rhythm: Normal rate.   Pulmonary:      Effort: Pulmonary effort is normal.      Breath sounds: No wheezing or rales.   Abdominal:      Palpations: Abdomen is soft.      Tenderness: There is no abdominal tenderness.   Musculoskeletal:      Cervical back: Normal range of motion.   Skin:     Findings: No rash.   Neurological:      Mental Status: Patient  is alert and oriented to person, place, and time.   Psychiatric:         Mood and Affect: Mood and " affect normal.         Judgment: Judgment normal.       Ortho Exam      RIGHT HAND: Positive Tinel's. Neurovascular intact. Sensation grossly intact. No swelling, atrophy, and skin discoloration. Skin intact. Full ROM. Patient able to wiggle fingers and make a fist. Full wrist extension, full wrist flexion, full , full thumb opposition, full PIP flexors, full DIP flexors, full PIP extensors, full finger adduction, full finger abduction. Radial pulse 2+, ulnar pulse 2+.     LEFT HAND: Neurovascular intact. Sensation grossly intact. No swelling, atrophy, and skin discoloration. Skin intact. Full ROM. Patient able to wiggle fingers and make a fist. Full wrist extension, full wrist flexion, full , full thumb opposition, full PIP flexors, full DIP flexors, full PIP extensors, full finger adduction, full finger abduction. Radial pulse 2+, ulnar pulse 2+.           Small Joint Arthrocentesis  Consent given by: patient  Site marked: site marked  Timeout: Immediately prior to procedure a time out was called to verify the correct patient, procedure, equipment, support staff and site/side marked as required   Supporting Documentation  Indications: pain   Procedure Details  Location: right carpal tunnel.  Preparation: Patient was prepped and draped in the usual sterile fashion  Needle gauge: 23G.  Medications administered: 1 mL bupivacaine 0.25 %; 40 mg triamcinolone acetonide 40 MG/ML  Patient tolerance: patient tolerated the procedure well with no immediate complications              Imaging Results (Most Recent)     None           Result Review :     [facility]  [facility]   IMPRESSION: Mild bilateral carpal tunnel syndrome.     Assessment and Plan     DX: Bilateral carpal tunnel syndrome     Discussed treatment plans and diagnosis with the patient. A right hand injection given, she tolerated this well. At home exercises provided. Continue the use of the braces.     Call or return if worsening  symptoms.    Follow Up     Prn.       Patient was given instructions and counseling regarding her condition or for health maintenance advice. Please see specific information pulled into the AVS if appropriate.     Scribed for Sb Carrasquillo MD by Kylie Hanley.  01/10/22   16:02 EST    I have personally performed the services described in this document as scribed by the above individual and it is both accurate and complete. Sb Carrasquillo MD 01/10/22

## 2022-01-11 ENCOUNTER — TREATMENT (OUTPATIENT)
Dept: PHYSICAL THERAPY | Facility: CLINIC | Age: 47
End: 2022-01-11

## 2022-01-11 DIAGNOSIS — M25.559 PAIN, HIP: Primary | ICD-10-CM

## 2022-01-11 DIAGNOSIS — R26.9 GAIT DISTURBANCE: ICD-10-CM

## 2022-01-11 PROCEDURE — 97110 THERAPEUTIC EXERCISES: CPT | Performed by: PHYSICAL THERAPIST

## 2022-01-11 PROCEDURE — 97162 PT EVAL MOD COMPLEX 30 MIN: CPT | Performed by: PHYSICAL THERAPIST

## 2022-01-11 NOTE — PROGRESS NOTES
Physical Therapy Initial Evaluation and Plan of Care      Patient: Mallory Mata   : 1975  Diagnosis/ICD-10 Code:  Pain, hip [M25.559]  Referring practitioner: Sb Carrasquillo MD  Date of Initial Visit: 2022  Today's Date: 2022  Patient seen for 1 sessions    Progress note due: 2/10/2022  Re-cert due: 4/10/2022           Subjective Questionnaire: LEFS: 30    PMH: bilateral carpel tunnel syndrome,  HTN, dystonia, HTN, spastic gait, spasticity, anxiety, depressive disorder, HBP, UCL repair left arm, wrist surgery, meningitis   Precautions/Contraindication: left spastic hemiparesis, left AFO, fall risk      Subjective Evaluation    History of Present Illness  Onset date: 2021.  Mechanism of injury: Pt states she slipped and fell at [facility] on 21. Her  helped her up. She did not go to the ER. She saw Dr. Carrasquillo for her hip and had X-rays. She was given injection into her hip which has helped pain at night. She is due for a shot next month. Since the fall, activity hurts her left hip and feels stiff with rest. She has a left AFO and does not us any other assistive device. No other falls since Sept. She works from home and does not have any stairs at home. She is still independent at home.       Patient Occupation: Works from home  Pain  Current pain ratin  Location: Left hip   Quality: dull ache and sharp  Alleviating factors: hot bath   Progression: improved    Diagnostic Tests  X-ray: normal    Patient Goals  Patient goals for therapy: decreased pain             Objective          Active Range of Motion   Left Hip   Flexion: WFL and with pain    Right Hip   Normal active range of motion    Additional Active Range of Motion Details  L hip AROM 50% decrease as compared to R for extension, IR, and ER. Pain with AROM       Strength/Myotome Testing     Left Hip   Planes of Motion   Flexion: 4-  Abduction: 4-  Adduction: 4-    Right Hip   Planes of Motion   Flexion: 4+  Abduction:  4+  Adduction: 4+    Left Knee   Flexion: 4+  Extension: 4+    Right Knee   Flexion: 5  Extension: 5  Quadriceps contraction: good    Right Ankle/Foot   Dorsiflexion: 5    Additional Strength Details  Hip MMT assessed in sitting position   L NT: L AFO    Tests     Left Hip   Leighton: Positive.     Left Hip Flexibility Comments:   Decreased flexibility of L hip flexor in supine     Ambulation     Observational Gait   Gait: antalgic   Decreased walking speed, left stance time, left swing time and left step length.   Left foot contact pattern: foot flat    Quality of Movement During Gait     Knee    Knee (Left): Positive stiff knee.     Additional Quality of Movement During Gait Details  L AFO              Assessment & Plan     Assessment  Impairments: abnormal gait, abnormal or restricted ROM, activity intolerance, impaired balance, impaired physical strength, pain with function and weight-bearing intolerance  Functional Limitations: sleeping, walking, pulling, pushing, uncomfortable because of pain, sitting, standing and unable to perform repetitive tasks  Assessment details: Pt presents with left hip pain and weakness. Pain is worse with daily activities such as walking , sitting, and standing. Pt has limited tolerance to daily activities due to pain and would benefit from skilled PT address deficits in left hip ROM, strength, and functional activities to return to PLOF.     Goals  Plan Goals: HIP PROBLEMS    1. The patient complains of L hip pain.   LTG 1: 12 weeks:  The patient will report a pain rating of 2/10 or better in order to improve  tolerance to activities of daily living and improve sleep quality.    STATUS:  New   STG 1a: 6 weeks:  The patient will report a pain rating of 4/10 or better.    STATUS:  New   TREATMENT:  Therapeutic exercises, manual therapy, aquatic therapy, home exercise   instruction, and modalities as needed for pain to include:  electrical stimulation, moist heat, ice,   ultrasound, and  diathermy.    2. The patient demonstrates weakness of the L hip.   LTG 2: 12 weeks:  The patient will demonstrate 4+/5 strength for L hip flexion, abduction,  and extension in order to improve hip stability.    STATUS:  New   STG 2a: 6 weeks:  The patient will demonstrate 4/5 strength for L hip flexion, abduction,  and extension.    STATUS:  New   TREATMENT: Therapeutic exercises, manual therapy, aquatic therapy, home exercise instruction,  and modalities as needed for pain to include:  electrical stimulation, moist heat, ice, and ultrasound    3. The patient has gait dysfunction.  LTG 3: 12 weeks:  The patient will ambulate without assistive device, independently, for community distances with minimal limp to the L lower extremity in order to improve mobility and allow patient to perform activities such as grocery shopping with greater ease.  STATUS:  New  STG 3a: The patient will be independent in [facility].  STATUS:  New  TREATMENT: Gait training, aquatic therapy, therapeutic exercise, and home exercise instruction.    4. Mobility: Walking/Moving Around Functional Limitation    LTG 4: 12 weeks:  The patient will demonstrate improvement in LEFS score to 46/80 to decrease functional disability  STATUS:  New  STG 4a: 6 weeks:  The patient will demonstrate improvement in LEFS score to 38/80 to decrease functional disability  STATUS:  New  TREATMENT:  Manual therapy, therapeutic exercise, home exercise instruction, and modalities as needed to include: moist heat, electrical stimulation, and ultrasound.      Plan  Therapy options: will be seen for skilled therapy services  Planned modality interventions: cryotherapy, TENS and thermotherapy (hydrocollator packs)  Planned therapy interventions: balance/weight-bearing training, flexibility, functional ROM exercises, gait training, home exercise program, joint mobilization, manual therapy, neuromuscular re-education, soft tissue mobilization, strengthening, stretching and  therapeutic activities  Frequency: 2x week  Duration in weeks: 8  Treatment plan discussed with: patient        Visit Diagnoses:    ICD-10-CM ICD-9-CM   1. Pain, hip  M25.559 719.45   2. Gait disturbance  R26.9 781.2       Timed:         Manual Therapy:    0     mins  17432;     Therapeutic Exercise:    8     mins  16211;     Neuromuscular Mariel:    0    mins  19827;    Therapeutic Activity:     0     mins  39607;     Gait Trainin     mins  88551;     Ultrasound:     0     mins  90043;    Ionto                               0    mins   43361  Self-care  __0__ mins 47412    Un-Timed:  Electrical Stimulation:    0     mins  55239 ( );  Traction     0     mins 09111  Low Eval     0     Mins  23982  Mod Eval     40     Mins  39898  High Eval                       0     Mins  26599  Hot pack     0     Mins    Cold pack                       0     Mins      Timed Treatment:   8   mins   Total Treatment:     48   mins    PT SIGNATURE: Jasbir Munoz PT, DPT      Initial Certification  Certification Period: 2022 thru 4/10/2022  I certify that the therapy services are furnished while this patient is under my care.  The services outlined above are required by this patient, and will be reviewed every 90 days.     PHYSICIAN: Sb Carrasquillo MD   NPI: [id number]     DATE:     Please sign and return via fax to [phone].. Thank you, [facility].

## 2022-01-18 ENCOUNTER — TREATMENT (OUTPATIENT)
Dept: PHYSICAL THERAPY | Facility: CLINIC | Age: 47
End: 2022-01-18

## 2022-01-18 DIAGNOSIS — R26.9 GAIT DISTURBANCE: ICD-10-CM

## 2022-01-18 DIAGNOSIS — M25.559 PAIN, HIP: Primary | ICD-10-CM

## 2022-01-18 PROCEDURE — 97110 THERAPEUTIC EXERCISES: CPT | Performed by: PHYSICAL THERAPIST

## 2022-01-18 PROCEDURE — 97140 MANUAL THERAPY 1/> REGIONS: CPT | Performed by: PHYSICAL THERAPIST

## 2022-01-18 NOTE — PROGRESS NOTES
Physical Therapy Daily Treatment Note      Patient: Mallory Mata   : 1975  Referring practitioner: Sb Carrasquillo MD  Date of Initial Visit: Type: THERAPY  Noted: 2022  Today's Date: 2022  Patient seen for 2 sessions         Mallory Mata reports: hip pain is a 5/10 today. A couple of the HEP exercises she could not do due to clutter on bed.       Subjective     Objective   See Exercise, Manual, and Modality Logs for complete treatment.       Assessment & Plan     Assessment    Assessment details: Pt demonstrates increased tightness and tone of left hip flexors and rotation and weakness of hip extensors and abductors. Pt demonstrates decrease in left hip stiffness following the session. Progress next visit for hip strengthening as tolerated.         Visit Diagnoses:    ICD-10-CM ICD-9-CM   1. Pain, hip  M25.559 719.45   2. Gait disturbance  R26.9 781.2       Progress per Plan of Care            Timed:         Manual Therapy:    15     mins  81185;     Therapeutic Exercise:    10     mins  61649;     Neuromuscular Mariel:    0    mins  53279;    Therapeutic Activity:     0     mins  21830;     Gait Trainin     mins  22995;     Ultrasound:     0     mins  45157;    Ionto                               0    mins   32738  Self-care  __0__ mins 22674    Un-Timed:  Electrical Stimulation:    0     mins  56550 ( );  Traction     0     mins 17281  Hot pack     0     Mins    Cold pack                       0     Mins      Timed Treatment:   25   mins   Total Treatment:     25   mins    Jasbir Munoz PT, DPT  Physical Therapist    NPI:[id number]  [place] License: [id number]

## 2022-01-25 ENCOUNTER — TREATMENT (OUTPATIENT)
Dept: PHYSICAL THERAPY | Facility: CLINIC | Age: 47
End: 2022-01-25

## 2022-01-25 DIAGNOSIS — R26.9 GAIT DISTURBANCE: ICD-10-CM

## 2022-01-25 DIAGNOSIS — M25.559 PAIN, HIP: Primary | ICD-10-CM

## 2022-01-25 PROCEDURE — 97140 MANUAL THERAPY 1/> REGIONS: CPT | Performed by: PHYSICAL THERAPIST

## 2022-01-25 PROCEDURE — 97110 THERAPEUTIC EXERCISES: CPT | Performed by: PHYSICAL THERAPIST

## 2022-01-25 NOTE — PROGRESS NOTES
Physical Therapy Daily Progress Note        Patient: Mallory Mata   : 1975  Diagnosis/ICD-10 Code:  Pain, hip [M25.559]  Referring practitioner: Sb Carrasquillo MD  Date of Initial Visit: Type: THERAPY  Noted: 2022  Today's Date: 2022  Patient seen for 3 sessions             Subjective   Mallory Mata reports: no changes since last visit.    Objective   Left hip flexion MMT: 3+/5  See Exercise, Manual, and Modality Logs for complete treatment.       Assessment/Plan  Patient tolerated all exercise progressions and manual therapy well today but continues to demonstrate strength/ROM deficits limiting function. Continue to progress per patient tolerance.    Progress per Plan of Care           Timed:  Manual Therapy:    8     mins  61253;  Therapeutic Exercise:    15     mins  13409;     Neuromuscular Mariel:    0    mins  80910;    Therapeutic Activity:     0     mins  01913;     Gait Trainin     mins  07411;     Ultrasound:     0     mins  07820;    Electrical Stimulation:    0     mins  11053;  Iontophoresis     0     mins  41062    Untimed:  Electrical Stimulation:    0     mins  49990 ( );  Mechanical Traction:    0     mins  66749;   Fluidotherapy     0     mins  38752  Hot pack     0     Mins    Cold pack                       0     Mins     Timed Treatment:   23   mins   Total Treatment:     23   mins        Shirley Montilla PT    Electronically signed [unfilled]  [place] License: [id number]  NPI number: [id number]

## 2022-01-27 NOTE — PROGRESS NOTES
Physical Therapy Daily Progress Note        Patient: Mallory Mata   : 1975  Diagnosis/ICD-10 Code:  Pain, hip [M25.559]  Referring practitioner: Sb Carrasquillo MD  Date of Initial Visit: No linked episodes  Today's Date: 2022  Patient seen for Visit count could not be calculated. Make sure you are using a visit which is associated with an episode. sessions             Subjective   Mallory Mata reports that she continues to have constant pain in her left hip.  She rates her average hip pain at 6/10 upon arrival.    Objective     Left Hip Strength Grossly : 4/5 B    PROM Left Hip Extension- 0-5 degrees    +Severe Muscular tightness and hypertonicity entire Left Lower Extremity/Left Upper Extremity    See Exercise and Manual Logs for complete treatment.       Assessment/Plan     Pt tolerated therapy session well - with progression of therapeutic exercises, CKC-Functional activities, and Manual therapy. She has improved, but continues to have deficits in  Left Lower Extremity ROM,  Strength, and Pain; limiting functional mobility and ability to perform ADLs at this time.  Pt continues to exhibit significant muscular tightness with long history of Hypertonicity in left lower extremity; but does respond well to manual stretching and soft tissue mobilization- reporting decrease in pain and tightness post session.    Progress per Plan of Care           Timed:  Manual Therapy:    10     mins  25987;  Therapeutic Exercise:    20     mins  62277;     Neuromuscular Mariel:    0    mins  39880;    Therapeutic Activity:     8     mins  45550;     Gait Trainin     mins  28103;     Ultrasound:     0     mins  52045;    Electrical Stimulation:    0     mins  53127;  Iontophoresis     0     mins  67749    Untimed:  Electrical Stimulation:    0     mins  82278 ( );  Mechanical Traction:    0     mins  67114;   Fluidotherapy     0     mins  44061  Hot pack     0     mins  50536  Cold pack     0     mins   50434    Timed Treatment:   38   mins   Total Treatment:     38   mins        Rachna Rosas PTA  Physical Therapist Assistant

## 2022-01-28 ENCOUNTER — TREATMENT (OUTPATIENT)
Dept: PHYSICAL THERAPY | Facility: CLINIC | Age: 47
End: 2022-01-28

## 2022-01-28 DIAGNOSIS — M25.559 PAIN, HIP: Primary | ICD-10-CM

## 2022-01-28 DIAGNOSIS — R26.9 GAIT DISTURBANCE: ICD-10-CM

## 2022-01-28 PROCEDURE — 97530 THERAPEUTIC ACTIVITIES: CPT | Performed by: PHYSICAL THERAPIST

## 2022-01-28 PROCEDURE — 97140 MANUAL THERAPY 1/> REGIONS: CPT | Performed by: PHYSICAL THERAPIST

## 2022-01-28 PROCEDURE — 97110 THERAPEUTIC EXERCISES: CPT | Performed by: PHYSICAL THERAPIST

## 2022-02-04 ENCOUNTER — TELEMEDICINE (OUTPATIENT)
Dept: INTERNAL MEDICINE | Facility: CLINIC | Age: 47
End: 2022-02-04

## 2022-02-04 DIAGNOSIS — M62.838 MUSCLE SPASTICITY: Primary | ICD-10-CM

## 2022-02-04 DIAGNOSIS — F33.42 MAJOR DEPRESSIVE DISORDER, RECURRENT EPISODE, IN FULL REMISSION: ICD-10-CM

## 2022-02-04 DIAGNOSIS — F41.9 ANXIETY: ICD-10-CM

## 2022-02-04 DIAGNOSIS — G24.9 DYSTONIA: ICD-10-CM

## 2022-02-04 PROCEDURE — 99213 OFFICE O/P EST LOW 20 MIN: CPT | Performed by: INTERNAL MEDICINE

## 2022-02-04 RX ORDER — BREXPIPRAZOLE 2 MG/1
1 TABLET ORAL DAILY
COMMUNITY
Start: 2022-01-31 | End: 2023-02-27

## 2022-02-04 NOTE — PROGRESS NOTES
TELEHEALTH VISIT  Mode of Visit: Video  Location of patient: home  You have chosen to receive care through a telehealth visit.  Patient understanding that this is a telehealth visit.  I cannot perform a full physical exam, therefore something might be missed, or patient may need to come into the office for further evaluation.  Patient is understanding and consents to this type of visit.  The visit included audio and video interaction.     Chief Complaint  Follow up on contractures and anxiety    Subjective          Mallory Mata presents to [facility]  History of Present Illness     Contractures-  Has started physical therapy  Feels like that is going well for her  Still with some stiffness  Trying to still do the botox appointments with PM&R  Still waiting on getting her brace and getting some shoes and she is working with a person out of [place] for this    She is continuing to work with her psychiatrist for her stress  She was noticing mood swings so they increased her rexulti  She feels like she is doing better since then    Objective   Physical Exam   Constitutional: She appears well-developed and well-nourished.   HENT:   Head: Normocephalic and atraumatic.   Nose: Nose normal.   Eyes: Pupils are equal, round, and reactive to light. EOM are normal.   Neck: Neck normal appearance.  Pulmonary/Chest: Effort normal.   Musculoskeletal:      Comments: Left sided contractures   Neurological: She is alert.   Psychiatric: She has a normal mood and affect.     Result Review :       Common labs    Common Labsle 7/2/21 7/2/21    0747 0747   Glucose  127 (A)   BUN  9   Creatinine  0.69   eGFR African Am  112   Sodium  138   Potassium  3.8   Chloride  103   Calcium  9.5   Albumin  3.90   Total Bilirubin  0.2   Alkaline Phosphatase  94   AST (SGOT)  15   ALT (SGPT)  12   WBC 7.23    Hemoglobin 12.8    Hematocrit 39.9    Platelets 300    (A) Abnormal value               Results for orders placed or performed in visit on 11/02/21   POC Urine Drug Screen [facility]    Specimen: Urine   Result Value Ref Range    Amphetamine Screen, Urine Negative Negative    AMP INTERNAL CONTROL Passed Passed    Barbiturates Screen, Urine Negative Negative    BARBITURATE INTERNAL CONTROL Passed Passed    Buprenorphine, Screen, Urine Negative Negative    BUPRENORPHINE INTERNAL CONTROL Passed Passed    Benzodiazepine Screen, Urine Negative Negative    BENZODIAZEPINE INTERNAL CONTROL Passed Passed    Cocaine Screen, Urine Positive (A) Negative    COCAINE INTERNAL CONTROL Passed Passed    MDMA (ECSTASY) Negative Negative    MDMA (ECSTASY) INTERNAL CONTROL Passed Passed    Methamphetamine, Ur Negative Negative    METHAMPHETAMINE INTERNAL CONTROL Passed Passed    Methadone Screen, Urine Negative Negative    METHADONE INTERNAL CONTROL Passed Passed    Opiate Screen Negative Negative    OPIATES INTERNAL CONTROL Passed Passed    Oxycodone Screen, Urine Negative Negative    OXYCODONE INTERNAL CONTROL Passed Passed    Phencyclidine (PCP), Urine Negative Negative    PHENCYCLIDINE INTERNAL CONTROL Passed Passed    THC, Screen, Urine Positive (A) Negative    THC INTERNAL CONTROL Passed Passed    Lot Number [id number]     Expiration Date 3,821,023             Procedures        Assessment and Plan    Diagnoses and all orders for this visit:    1. Muscle spasticity (Primary)  Comments:  cont botox, physical therapy and working with PM&R    2. Dystonia  Comments:  doing well, cont botox    3. Anxiety  Comments:  doing well with sidney Hahn to work with him.  Discussed that rexulti can cause weight gain so she is going to montior that.    4. Major depressive disorder, recurrent episode, in full remission (HCC)  Comments:  doing well, cont to work with psychiatry                Follow Up   Return in about 7 months (around 9/4/2022).  Patient was given instructions and counseling regarding her condition or  for health maintenance advice. Please see specific information pulled into the AVS if appropriate.

## 2022-02-08 ENCOUNTER — TREATMENT (OUTPATIENT)
Dept: PHYSICAL THERAPY | Facility: CLINIC | Age: 47
End: 2022-02-08

## 2022-02-08 DIAGNOSIS — M25.559 PAIN, HIP: Primary | ICD-10-CM

## 2022-02-08 DIAGNOSIS — R26.9 GAIT DISTURBANCE: ICD-10-CM

## 2022-02-08 PROCEDURE — 97140 MANUAL THERAPY 1/> REGIONS: CPT | Performed by: PHYSICAL THERAPIST

## 2022-02-08 PROCEDURE — 97110 THERAPEUTIC EXERCISES: CPT | Performed by: PHYSICAL THERAPIST

## 2022-02-08 NOTE — PROGRESS NOTES
Physical Therapy Daily Treatment Note      Patient: Mallory Mata   : 1975  Referring practitioner: Sb Carrasquillo MD  Date of Initial Visit: No linked episodes  Today's Date: 2022  Patient seen for Visit count could not be calculated. Make sure you are using a visit which is associated with an episode. sessions         Mallory Mata reports: ***      Subjective     Objective   See Exercise, Manual, and Modality Logs for complete treatment.       Assessment/Plan    Visit Diagnoses:    ICD-10-CM ICD-9-CM   1. Pain, hip  M25.559 719.45   2. Gait disturbance  R26.9 781.2       {AMB PT PLAN (SOAP Note):08307}           Timed:         Manual Therapy:    ***     mins  85435;     Therapeutic Exercise:    ***     mins  98326;     Neuromuscular Mariel:    ***    mins  15192;    Therapeutic Activity:     ***     mins  68056;     Gait Training:      ***     mins  59326;     Ultrasound:     ***     mins  43509;    Ionto                               ***    mins   53823  Self-care  __***__ mins 42457    Un-Timed:  Electrical Stimulation:    ***     mins  80994 ( );  Traction     ***     mins 35046  Hot pack     ***     Mins    Cold pack                       ***     Mins      Timed Treatment:   ***   mins   Total Treatment:     ***   mins    Jasbir Munoz PT, DPT  Physical Therapist    NPI:[id number]  [place] License: [id number]

## 2022-02-08 NOTE — PROGRESS NOTES
Physical Therapy Daily Progress Note        Patient: Mallory Mata   : 1975  Diagnosis/ICD-10 Code:  Pain, hip [M25.559]  Referring practitioner: Sb Carrasquillo MD  Date of Initial Visit: Type: THERAPY  Noted: 2022  Today's Date: 2022  Patient seen for 5 sessions             Subjective   Mallory Mata reports: no changes today.    Objective   L hip It tightness noted  See Exercise, Manual, and Modality Logs for complete treatment.       Assessment/Plan  Patient tolerated all exercise progressions and manual therapy well today but continues to demonstrate strength/ROM deficits limiting function. Continue to progress per patient tolerance.    Progress per Plan of Care           Timed:  Manual Therapy:    8     mins  26034;  Therapeutic Exercise:    15     mins  30090;     Neuromuscular Mariel:    0    mins  27969;    Therapeutic Activity:     0     mins  08687;     Gait Trainin     mins  84994;     Ultrasound:     0     mins  06734;    Electrical Stimulation:    0     mins  41269;  Iontophoresis     0     mins  21907    Untimed:  Electrical Stimulation:    0     mins  16891 ( );  Mechanical Traction:    0     mins  74330;   Fluidotherapy     0     mins  03348  Hot pack     0     Mins    Cold pack                       0     Mins     Timed Treatment:   23   mins   Total Treatment:     35   mins        Shirley Montilla PT    Electronically signed [unfilled]  [place] License: [id number]  NPI number: [id number]

## 2022-02-10 ENCOUNTER — TREATMENT (OUTPATIENT)
Dept: PHYSICAL THERAPY | Facility: CLINIC | Age: 47
End: 2022-02-10

## 2022-02-10 DIAGNOSIS — R26.9 GAIT DISTURBANCE: ICD-10-CM

## 2022-02-10 DIAGNOSIS — M25.559 PAIN, HIP: Primary | ICD-10-CM

## 2022-02-10 PROCEDURE — 97140 MANUAL THERAPY 1/> REGIONS: CPT | Performed by: PHYSICAL THERAPIST

## 2022-02-10 NOTE — PROGRESS NOTES
PT Progress Note       Patient: Mallory Mata   : 1975  Diagnosis/ICD-10 Code:  Pain, hip [M25.559]  Referring practitioner: Sb Carrasquillo MD  Date of Initial Visit: Type: THERAPY  Noted: 2022  Today's Date: 2/10/2022  Patient seen for 6 sessions    Progress note due: 3/12/2022  Re-cert due: 5/10/2022    Subjective:     Subjective Questionnaire: LEFS: 46/80  Clinical Progress: improved  Home Program Compliance: Yes  Treatment has included: therapeutic exercise and manual therapy    Subjective Evaluation    History of Present Illness  Mechanism of injury: Pt states her hip is less pain with activity such as walking but still bothers her when she sits for prolonged periods. She says she is going to try to get up every hours          Objective   Active Range of Motion   Left Hip   Flexion: WFL and with pain     Right Hip   Normal active range of motion    Additional Active Range of Motion Details  L hip AROM 50% decrease as compared to R for extension, IR, and ER. Pain with AROM       Strength/Myotome Testing      Left Hip   Planes of Motion   Flexion: 4  Abduction: 4-  Adduction: 4     Right Hip   Planes of Motion   Flexion: 4+  Abduction: 4+  Adduction: 4+     Left Knee   Flexion: 4+  Extension: 4+     Right Knee   Flexion: 5  Extension: 5  Quadriceps contraction: good     Right Ankle/Foot   Dorsiflexion: 5    Additional Strength Details  Hip MMT assessed in sitting position   L NT: L AFO     Tests      Left Hip   Leighton: Positive.      Left Hip Flexibility Comments:   Decreased flexibility of L hip flexor in supine      Ambulation      Observational Gait   Gait: antalgic   Decreased walking speed, left stance time, left swing time and left step length.   Left foot contact pattern: foot flat     Quality of Movement During Gait      Knee     Knee (Left): Positive stiff knee.     Additional Quality of Movement During Gait Details  L AFO   Assessment & Plan     Assessment  Impairments: abnormal  coordination, abnormal gait, abnormal muscle firing, abnormal muscle tone, abnormal or restricted ROM, activity intolerance, impaired balance, impaired physical strength, pain with function, safety issue and weight-bearing intolerance  Functional Limitations: walking, uncomfortable because of pain, standing and unable to perform repetitive tasks  Assessment details: Pt demonstrates improvement in hip strength and mobility and is having less pain with functional activity. Pt is showing subjective improvement with LEFS as well. Pt is on track to reach long term goals. Pt will benefit from skilled PT to address functional deficits and return to PLOF.         Goals  Plan Goals: 1. The patient complains of L hip pain.              LTG 1: 12 weeks:  The patient will report a pain rating of 2/10 or better in order to improve  tolerance to activities of daily living and improve sleep quality.                          STATUS:  ongoing              STG 1a: 6 weeks:  The patient will report a pain rating of 4/10 or better.                          STATUS: ongoing              TREATMENT:  Therapeutic exercises, manual therapy, aquatic therapy, home exercise   instruction, and modalities as needed for pain to include:  electrical stimulation, moist heat, ice,   ultrasound, and diathermy.    2. The patient demonstrates weakness of the L hip.              LTG 2: 12 weeks:  The patient will demonstrate 4+/5 strength for L hip flexion, abduction,  and extension in order to improve hip stability.                          STATUS:  ongoing              STG 2a: 6 weeks:  The patient will demonstrate 4/5 strength for L hip flexion, abduction,  and extension.                          STATUS:  ongoing              TREATMENT: Therapeutic exercises, manual therapy, aquatic therapy, home exercise instruction,  and modalities as needed for pain to include:  electrical stimulation, moist heat, ice, and ultrasound    3. The patient has gait  dysfunction.  LTG 3: 12 weeks:  The patient will ambulate without assistive device, independently, for community distances with minimal limp to the L lower extremity in order to improve mobility and allow patient to perform activities such as grocery shopping with greater ease.  STATUS:  ongoing  STG 3a: The patient will be independent in [facility].  STATUS:  met  TREATMENT: Gait training, aquatic therapy, therapeutic exercise, and home exercise instruction.    4. Mobility: Walking/Moving Around Functional Limitation                   LTG 4: 12 weeks:  The patient will demonstrate improvement in LEFS score to 46/80 to decrease functional disability  STATUS:  met  STG 4a: 6 weeks:  The patient will demonstrate improvement in LEFS score to 38/80 to decrease functional disability  STATUS:  met  TREATMENT:  Manual therapy, therapeutic exercise, home exercise instruction, and modalities as needed to include: moist heat, electrical stimulation, and ultrasound.    Plan  Therapy options: will be seen for skilled therapy services  Planned modality interventions: cryotherapy, TENS and thermotherapy (hydrocollator packs)  Planned therapy interventions: balance/weight-bearing training, flexibility, functional ROM exercises, gait training, home exercise program, joint mobilization, manual therapy, neuromuscular re-education, soft tissue mobilization, strengthening, stretching and therapeutic activities  Frequency: 2x week  Duration in weeks: 4        Visit Diagnoses:    ICD-10-CM ICD-9-CM   1. Pain, hip  M25.559 719.45   2. Gait disturbance  R26.9 781.2       Progress toward previous goals: Partially Met    Recommendations: Continue as planned  Timeframe: 1 month  Prognosis to achieve goals: good    PT Signature: Jasbir Munoz PT, DPT          Based upon review of the patient's progress and continued therapy plan, it is my medical opinion that Mallory Mata should continue physical therapy treatment at [facility]  [facility]  [address] [place] [id number] [phone].  Signature:  Sb Carrasquillo MD  NPI: [id number]  Timed:         Manual Therapy:    15     mins  38804;     Therapeutic Exercise:    5     mins  61572;     Neuromuscular Mariel:    0    mins  71175;    Therapeutic Activity:     0     mins  32597;     Gait Trainin     mins  11610;     Ultrasound:     0     mins  36924;    Ionto                               0    mins   13058  Self-care  __0__ mins 52605    Un-Timed:  Electrical Stimulation:    0     mins  59485 ( );  Traction     0     mins 55105  Re- Eval     5     Mins     Hot pack     0     Mins    Cold pack                       0     Mins      Timed Treatment:   20   mins   Total Treatment:     25   mins

## 2022-02-17 ENCOUNTER — TREATMENT (OUTPATIENT)
Dept: PHYSICAL THERAPY | Facility: CLINIC | Age: 47
End: 2022-02-17

## 2022-02-17 DIAGNOSIS — R26.9 GAIT DISTURBANCE: ICD-10-CM

## 2022-02-17 DIAGNOSIS — M25.559 PAIN, HIP: Primary | ICD-10-CM

## 2022-02-17 PROCEDURE — 97110 THERAPEUTIC EXERCISES: CPT | Performed by: PHYSICAL THERAPIST

## 2022-02-17 NOTE — PROGRESS NOTES
Physical Therapy Daily Treatment Note      Patient: Mallory Mata   : 1975  Referring practitioner: Sb Carrasquillo MD  Date of Initial Visit: Type: THERAPY  Noted: 2022  Today's Date: 2022  Patient seen for 7 sessions         Mallory Mata reports: hip pain is the same as it normally is this morning. She says she always feels better after therapy but then goes back to square one.       Subjective     Objective   See Exercise, Manual, and Modality Logs for complete treatment.       Assessment & Plan     Assessment    Assessment details: Pt tolerated session and had difficulty with hip stretching due to flexor tone. Pt educated on physioball bridge for HEP. Progress next visit as tolerated.         Visit Diagnoses:    ICD-10-CM ICD-9-CM   1. Pain, hip  M25.559 719.45   2. Gait disturbance  R26.9 781.2       Progress per Plan of Care           Timed:         Manual Therapy:    5     mins  64388;     Therapeutic Exercise:    23     mins  20230;     Neuromuscular Mariel:    0    mins  49348;    Therapeutic Activity:     0     mins  34640;     Gait Trainin     mins  30059;     Ultrasound:     0     mins  41814;    Ionto                               0    mins   72555  Self-care  __0__ mins 50004    Un-Timed:  Electrical Stimulation:    0     mins  96039 ( );  Traction     0     mins 57331  Hot pack     0     Mins    Cold pack                       0     Mins      Timed Treatment:   28   mins   Total Treatment:     28   mins    Jasbir Munoz PT, DPT  Physical Therapist    NPI:[id number]  [place] License: [id number]

## 2022-02-28 ENCOUNTER — OFFICE VISIT (OUTPATIENT)
Dept: ORTHOPEDIC SURGERY | Facility: CLINIC | Age: 47
End: 2022-02-28

## 2022-02-28 VITALS — HEIGHT: 67 IN | WEIGHT: 172 LBS | BODY MASS INDEX: 27 KG/M2

## 2022-02-28 DIAGNOSIS — M70.62 TROCHANTERIC BURSITIS OF LEFT HIP: Primary | ICD-10-CM

## 2022-02-28 DIAGNOSIS — M79.18 MYOFASCIAL MUSCLE PAIN: ICD-10-CM

## 2022-02-28 DIAGNOSIS — S79.912A INJURY OF LEFT HIP, INITIAL ENCOUNTER: ICD-10-CM

## 2022-02-28 PROCEDURE — 99213 OFFICE O/P EST LOW 20 MIN: CPT | Performed by: ORTHOPAEDIC SURGERY

## 2022-02-28 PROCEDURE — 20610 DRAIN/INJ JOINT/BURSA W/O US: CPT | Performed by: ORTHOPAEDIC SURGERY

## 2022-02-28 RX ORDER — BUPIVACAINE HYDROCHLORIDE 2.5 MG/ML
5 INJECTION, SOLUTION INFILTRATION; PERINEURAL
Status: COMPLETED | OUTPATIENT
Start: 2022-02-28 | End: 2022-02-28

## 2022-02-28 RX ORDER — TRIAMCINOLONE ACETONIDE 40 MG/ML
40 INJECTION, SUSPENSION INTRA-ARTICULAR; INTRAMUSCULAR
Status: COMPLETED | OUTPATIENT
Start: 2022-02-28 | End: 2022-02-28

## 2022-02-28 RX ADMIN — TRIAMCINOLONE ACETONIDE 40 MG: 40 INJECTION, SUSPENSION INTRA-ARTICULAR; INTRAMUSCULAR at 15:57

## 2022-02-28 RX ADMIN — BUPIVACAINE HYDROCHLORIDE 5 ML: 2.5 INJECTION, SOLUTION INFILTRATION; PERINEURAL at 15:57

## 2022-02-28 NOTE — PROGRESS NOTES
"Chief Complaint  Follow-up of the Right Shoulder and Follow-up of the Left Hip     Subjective      Mallory Mata presents to [facility] for a follow-up of right shoulder pain and follow-up of left hip. Patient [facility]. She has been having left hip pain since. She states pain on the lateral aspect of the hip. She states pain has worsened in the last 1 to 2 weeks. She states previous injection has given her relief.     Pain has complained of weakness and pain of the right shoulder. She has done therapy for the shoulder. This has helped with her strength.     No Known Allergies     Social History     Socioeconomic History   • Marital status:    Tobacco Use   • Smoking status: Current Every Day Smoker     Packs/day: 1.00     Years: 10.00     Pack years: 10.00     Types: Cigarettes     Start date: 3/21/2006   • Smokeless tobacco: Never Used   Vaping Use   • Vaping Use: Never used   Substance and Sexual Activity   • Alcohol use: Never   • Drug use: Not Currently     Comment: FORMER IN THE PAST    • Sexual activity: Defer        Review of Systems     Objective   Vital Signs:   Ht 168.9 cm (66.5\")   Wt 78 kg (172 lb)   BMI 27.35 kg/m²       Physical Exam  Constitutional:       Appearance: Normal appearance. Patient is well-developed and normal weight.   HENT:      Head: Normocephalic.      Right Ear: Hearing and external ear normal.      Left Ear: Hearing and external ear normal.      Nose: Nose normal.   Eyes:      Conjunctiva/sclera: Conjunctivae normal.   Cardiovascular:      Rate and Rhythm: Normal rate.   Pulmonary:      Effort: Pulmonary effort is normal.      Breath sounds: No wheezing or rales.   Abdominal:      Palpations: Abdomen is soft.      Tenderness: There is no abdominal tenderness.   Musculoskeletal:      Cervical back: Normal range of motion.   Skin:     Findings: No rash.   Neurological:      Mental Status: Patient  " is alert and oriented to person, place, and time.   Psychiatric:         Mood and Affect: Mood and affect normal.         Judgment: Judgment normal.       Ortho Exam      LEFT HIP: Good tone of hip flexors, hip extensors, hip adductor, hip abductors. Tender greater trochanter. Calf supple, non-tender, no signs of DVT. Dorsal Pedal Pulse 2+, posterior tibialis pulse 2+. Sensation grossly intact. Neurovascular intact.      RIGHT SHOULDER: Sensation grossly intact. Neurovascular intact.  Radial pulse 2+, ulnar pulse 2+. No swelling. Full elbow flexion and extension.       Large Joint Arthrocentesis: L greater trochanteric bursa  Date/Time: 2/28/2022 3:57 PM  Consent given by: patient  Site marked: site marked  Timeout: Immediately prior to procedure a time out was called to verify the correct patient, procedure, equipment, support staff and site/side marked as required   Supporting Documentation  Indications: pain   Procedure Details  Location: hip - L greater trochanteric bursa  Needle size: 22 G  Medications administered: 5 mL bupivacaine 0.25 %; 40 mg triamcinolone acetonide 40 MG/ML  Patient tolerance: patient tolerated the procedure well with no immediate complications              Imaging Results (Most Recent)     None           Result Review :     X-Ray Report:  Left hip(s) X-Ray  Indication: Evaluation of left hip pain   AP and Lateral view(s)  Findings: No acute fracture or dislocation.   Prior studies available for comparison: no     Assessment and Plan     DX: Left hip trochanteric bursitis   Myofascial pain of the shoulder     Discussed treatment plans with the patient. More orders placed for physical therapy. A left hip injection given, she tolerated this well.     Call or return if worsening symptoms.    Follow Up     PRN.       Patient was given instructions and counseling regarding her condition or for health maintenance advice. Please see specific information pulled into the AVS if appropriate.      Scribed for Sb Carrasquillo MD by Kylie Hanley.  02/28/22   15:48 EST      I have personally performed the services described in this document as scribed by the above individual and it is both accurate and complete. Sb Carrasquillo MD 02/28/22

## 2022-03-07 ENCOUNTER — TELEPHONE (OUTPATIENT)
Dept: INTERNAL MEDICINE | Facility: CLINIC | Age: 47
End: 2022-03-07

## 2022-03-07 ENCOUNTER — TELEMEDICINE (OUTPATIENT)
Dept: INTERNAL MEDICINE | Facility: CLINIC | Age: 47
End: 2022-03-07

## 2022-03-07 DIAGNOSIS — J04.0 LARYNGITIS: Primary | ICD-10-CM

## 2022-03-07 DIAGNOSIS — J06.9 ACUTE URI: ICD-10-CM

## 2022-03-07 PROCEDURE — 99213 OFFICE O/P EST LOW 20 MIN: CPT | Performed by: PHYSICIAN ASSISTANT

## 2022-03-08 PROBLEM — J06.9 ACUTE URI: Status: ACTIVE | Noted: 2022-03-08

## 2022-03-08 PROBLEM — J04.0 LARYNGITIS: Status: ACTIVE | Noted: 2022-03-08

## 2022-03-08 NOTE — ASSESSMENT & PLAN NOTE
Likely viral etiology, would expect this to continue to improve.  Would recommend voice rest and patient to go from work for another day or 2 to help with voice rest.  Encourage patient to drink warm liquids and ibuprofen for pain or discomfort.

## 2022-03-08 NOTE — ASSESSMENT & PLAN NOTE
Seems to be improving, would expect continued improvement.  Call or return if symptoms persist or worsen.

## 2022-03-10 RX ORDER — BACLOFEN 20 MG/1
TABLET ORAL
Qty: 90 TABLET | Refills: 0 | Status: SHIPPED | OUTPATIENT
Start: 2022-03-10 | End: 2022-04-24

## 2022-03-30 ENCOUNTER — TELEPHONE (OUTPATIENT)
Dept: INTERNAL MEDICINE | Facility: CLINIC | Age: 47
End: 2022-03-30

## 2022-03-30 NOTE — TELEPHONE ENCOUNTER
Caller: Mallory Mata    Relationship to patient: Self    Best call back number: [phone]    Patient is needing: PATIENT CALLED IN AND SAID SHE IS HAVING A LOT OF STRESS IN HER PERSONAL LIFE AND SHE FIRST STARTED SPOTTING A COUPLE OF WEEKS AGO AND IS STILL BLEEDING NOW. SHE IS COMING IN TO BE SEEN TOMORROW BUT WOULD LIKE A CALL BACK WITH NEXT BEST STEPS.

## 2022-03-31 ENCOUNTER — OFFICE VISIT (OUTPATIENT)
Dept: INTERNAL MEDICINE | Facility: CLINIC | Age: 47
End: 2022-03-31

## 2022-03-31 VITALS
WEIGHT: 172 LBS | SYSTOLIC BLOOD PRESSURE: 122 MMHG | BODY MASS INDEX: 27.35 KG/M2 | OXYGEN SATURATION: 100 % | HEART RATE: 99 BPM | DIASTOLIC BLOOD PRESSURE: 84 MMHG | TEMPERATURE: 97.5 F

## 2022-03-31 DIAGNOSIS — T14.90XA TRAUMA: ICD-10-CM

## 2022-03-31 DIAGNOSIS — N93.9 ABNORMAL UTERINE BLEEDING: Primary | ICD-10-CM

## 2022-03-31 PROCEDURE — 99213 OFFICE O/P EST LOW 20 MIN: CPT

## 2022-03-31 NOTE — PROGRESS NOTES
Chief Complaint  period problems (Bleeding for two weeks)    Subjective          Mallory Mata presents to [facility]  History of Present Illness    Mallory reports a recent episode of her  going to [facility] and the police showing up at her house and taking her son and nephew to [facility].  She reports she has been under significant amount of stress in the last 2 weeks in addition to the stress she has noticed that she has had some intermittent spotting.  She reports her menstrual cycles are normally regular and routine.  She reports the spotting has increased and is more light bleeding.  She reports that his heaviest changing about 2 pads a day.  She denies any abdominal pain, back pain, excessive uterine bleeding.  She reports she feels like her stress level could be causing it but she wanted to make sure.      Objective   Vital Signs:   /84   Pulse 99   Temp 97.5 °F (36.4 °C)   Wt 78 kg (172 lb)   SpO2 100%   BMI 27.35 kg/m²     Physical Exam  Vitals reviewed.   Constitutional:       General: She is not in acute distress.     Appearance: Normal appearance. She is well-developed. She is not diaphoretic.   HENT:      Head: Normocephalic and atraumatic. Hair is normal.      Right Ear: Hearing, tympanic membrane, ear canal and external ear normal. No decreased hearing noted. No drainage.      Left Ear: Hearing, tympanic membrane, ear canal and external ear normal. No decreased hearing noted.      Nose: Nose normal. No nasal deformity.      Mouth/Throat:      Mouth: Mucous membranes are moist.   Eyes:      General: Lids are normal.         Right eye: No discharge.         Left eye: No discharge.      Extraocular Movements: Extraocular movements intact.      Conjunctiva/sclera: Conjunctivae normal.      Pupils: Pupils are equal, round, and reactive to light.   Neck:      Thyroid: No thyroid mass, thyromegaly or thyroid tenderness.      Vascular: No JVD.    Cardiovascular:      Rate and Rhythm: Normal rate and regular rhythm.      Pulses: Normal pulses.      Heart sounds: Normal heart sounds. No murmur heard.    No friction rub. No gallop.   Pulmonary:      Effort: Pulmonary effort is normal. No respiratory distress.      Breath sounds: Normal breath sounds. No wheezing or rales.   Chest:      Chest wall: No tenderness.   Abdominal:      General: Bowel sounds are normal. There is no distension.      Palpations: Abdomen is soft. There is no mass.      Tenderness: There is no abdominal tenderness. There is no guarding or rebound.      Hernia: No hernia is present.   Musculoskeletal:         General: No tenderness or deformity. Normal range of motion.      Cervical back: Normal range of motion and neck supple.   Lymphadenopathy:      Cervical: No cervical adenopathy.   Skin:     General: Skin is warm and dry.      Findings: No erythema or rash.   Neurological:      Mental Status: She is alert and oriented to person, place, and time.      Cranial Nerves: No cranial nerve deficit.      Motor: No abnormal muscle tone.      Coordination: Coordination normal.      Deep Tendon Reflexes: Reflexes are normal and symmetric. Reflexes normal.   Psychiatric:         Mood and Affect: Mood normal.         Behavior: Behavior normal.         Thought Content: Thought content normal.         Judgment: Judgment normal.        Result Review :          Procedures      Assessment and Plan    Diagnoses and all orders for this visit:    1. Abnormal uterine bleeding (Primary)  Assessment & Plan:  Patient reports she started having intermittent spotting between menstrual cycles about 2 weeks ago and has not stopped.  She also reports several significant stressful events that she reports are very traumatizing.  Discussed causes of intermittent spotting and bleeding in between menstrual cycles and discussed sending to gynecology for further work-up if blood does not stop.  Patient deferred vaginal  exam today.  Physical exam unremarkable.    Orders:  -     Ambulatory Referral to Gynecology    2. Trauma  Assessment & Plan:  Patient describes some significant traumatic events lately that she is suffering from.  Patient states that she does have a therapist and she has been able to talk to them through her most stressful times.  She also reports taking her medications daily and doing well with them.  Discussed with the patient the importance of reaching out when she needs help for a feeling overwhelmed.  Discussed the patient can call the office for any further concerns or medication changes.  Recommended continue follow-up with therapy to help her through these stressful times and if she needs anything else she can give us a call.  Patient verbalized understanding.      Patient was instructed and educated on their diagnoses and treatment plan prior to leaving the office. If symptoms worsen or persist, seek emergency medical attention. Take all medications as prescribed. Call the office if any questions or concerns arise.     I spent 24 minutes caring for Mallory on this date of service. This time includes time spent by me in the following activities:preparing for the visit, reviewing tests, obtaining and/or reviewing a separately obtained history, performing a medically appropriate examination and/or evaluation , counseling and educating the patient/family/caregiver, ordering medications, tests, or procedures, documenting information in the medical record and independently interpreting results and communicating that information with the patient/family/caregiver  Follow Up   Return if symptoms worsen or fail to improve, for Next scheduled follow up.  Patient was given instructions and counseling regarding her condition or for health maintenance advice. Please see specific information pulled into the AVS if appropriate.

## 2022-03-31 NOTE — ASSESSMENT & PLAN NOTE
Patient reports she started having intermittent spotting between menstrual cycles about 2 weeks ago and has not stopped.  She also reports several significant stressful events that she reports are very traumatizing.  Discussed causes of intermittent spotting and bleeding in between menstrual cycles and discussed sending to gynecology for further work-up if blood does not stop.  Patient deferred vaginal exam today.  Physical exam unremarkable.

## 2022-03-31 NOTE — ASSESSMENT & PLAN NOTE
Patient describes some significant traumatic events lately that she is suffering from.  Patient states that she does have a therapist and she has been able to talk to them through her most stressful times.  She also reports taking her medications daily and doing well with them.  Discussed with the patient the importance of reaching out when she needs help for a feeling overwhelmed.  Discussed the patient can call the office for any further concerns or medication changes.  Recommended continue follow-up with therapy to help her through these stressful times and if she needs anything else she can give us a call.  Patient verbalized understanding.

## 2022-04-08 ENCOUNTER — TREATMENT (OUTPATIENT)
Dept: PHYSICAL THERAPY | Facility: CLINIC | Age: 47
End: 2022-04-08

## 2022-04-08 DIAGNOSIS — R26.9 GAIT DISTURBANCE: ICD-10-CM

## 2022-04-08 DIAGNOSIS — M25.559 PAIN, HIP: Primary | ICD-10-CM

## 2022-04-08 PROCEDURE — 97140 MANUAL THERAPY 1/> REGIONS: CPT | Performed by: PHYSICAL THERAPIST

## 2022-04-08 NOTE — PROGRESS NOTES
PT discharge note       Patient: Mallory Mata   : 1975  Diagnosis/ICD-10 Code:  Pain, hip [M25.559]  Referring practitioner: No ref. provider found  Date of Initial Visit: Type: THERAPY  Noted: 2022  Today's Date: 2022  Patient seen for 8 sessions    Progress note due: 2022  Re-cert due: 2022    Subjective:     Clinical Progress: unchanged  Home Program Compliance: Yes  Treatment has included: therapeutic exercise and manual therapy    Subjective   Objective   Active Range of Motion   Left Hip   Flexion: WFL and with pain     Right Hip   Normal active range of motion    Additional Active Range of Motion Details  L hip AROM 50% decrease as compared to R for extension, IR, and ER. Pain with AROM       Strength/Myotome Testing      Left Hip   Planes of Motion   Flexion: 4  Abduction: 4-  Adduction: 4     Right Hip   Planes of Motion   Flexion: 4+  Abduction: 4+  Adduction: 4+     Left Knee   Flexion: 4+  Extension: 4+     Right Knee   Flexion: 5  Extension: 5  Quadriceps contraction: good     Right Ankle/Foot   Dorsiflexion: 5    Additional Strength Details  Hip MMT assessed in sitting position   L NT: L AFO     Tests      Left Hip   Leighton: Positive.      Left Hip Flexibility Comments:   Decreased flexibility of L hip flexor in supine      Ambulation      Observational Gait   Gait: antalgic   Decreased walking speed, left stance time, left swing time and left step length.   Left foot contact pattern: foot flat     Quality of Movement During Gait      Knee     Knee (Left): Positive stiff knee.     Additional Quality of Movement During Gait Details  L AFO   Assessment & Plan     Assessment  Impairments: abnormal gait, abnormal or restricted ROM, activity intolerance, impaired balance, impaired physical strength, pain with function and weight-bearing intolerance  Functional Limitations: walking, uncomfortable because of pain, standing and unable to perform repetitive tasks  Assessment details:  Pt continues to demonstrates bilateral hip pain with daily activities and has not shown significant improvement since last progress note. Pt is compliant with HEP and does benefit from strengthening and stretching exercises for relief in pain. Pt would benefit from continuation of exercises at home and will follow up with Dr. Carrasquillo as needed.     Goals  Plan Goals:  1. The patient complains of L hip pain.              LTG 1: 12 weeks:  The patient will report a pain rating of 2/10 or better in order to improve  tolerance to activities of daily living and improve sleep quality.                          STATUS:  Not met               STG 1a: 6 weeks:  The patient will report a pain rating of 4/10 or better.                          STATUS:Not met               TREATMENT:  Therapeutic exercises, manual therapy, aquatic therapy, home exercise   instruction, and modalities as needed for pain to include:  electrical stimulation, moist heat, ice,   ultrasound, and diathermy.    2. The patient demonstrates weakness of the L hip.              LTG 2: 12 weeks:  The patient will demonstrate 4+/5 strength for L hip flexion, abduction,  and extension in order to improve hip stability.                          STATUS: Not met               STG 2a: 6 weeks:  The patient will demonstrate 4/5 strength for L hip flexion, abduction,  and extension.                          STATUS: Not met             3. The patient has gait dysfunction.  LTG 3: 12 weeks:  The patient will ambulate without assistive device, independently, for community distances with minimal limp to the L lower extremity in order to improve mobility and allow patient to perform activities such as grocery shopping with greater ease.  STATUS: Not met   STG 3a: The patient will be independent in HEP.  STATUS:  met  TREATMENT: Gait training, aquatic therapy, therapeutic exercise, and home exercise instruction.    4. Mobility: Walking/Moving Around Functional Limitation                    LTG 4: 12 weeks:  The patient will demonstrate improvement in LEFS score to 46/80 to decrease functional disability  STATUS:  met  STG 4a: 6 weeks:  The patient will demonstrate improvement in LEFS score to 38/80 to decrease functional disability  STATUS:  met  TREATMENT:  Manual therapy, therapeutic exercise, home exercise instruction, and modalities as needed to include: moist heat, electrical stimulation, and ultrasound.      Plan  Therapy options: will be seen for skilled therapy services  Planned modality interventions: cryotherapy and thermotherapy (hydrocollator packs)  Planned therapy interventions: balance/weight-bearing training, flexibility, functional ROM exercises, gait training, home exercise program, joint mobilization, manual therapy, neuromuscular re-education, soft tissue mobilization, strengthening, stretching and therapeutic activities  Frequency: 2x week  Duration in weeks: 6        Visit Diagnoses:    ICD-10-CM ICD-9-CM   1. Pain, hip  M25.559 719.45   2. Gait disturbance  R26.9 781.2       Progress toward previous goals: Not Met    Recommendations: Discharge    PT Signature: Jasbir Munoz, PT, DPT          Based upon review of the patient's progress and continued therapy plan, it is my medical opinion that Mallory Mata should continue physical therapy treatment at [facility]  [address] [place] [id number] [phone].  Signature:  Sb Carrasquillo MD     NPI: [id number]  Timed:         Manual Therapy:    23     mins  60745;     Therapeutic Exercise:    5     mins  94194;     Neuromuscular Mariel:    0    mins  15325;    Therapeutic Activity:     0     mins  69492;     Gait Trainin     mins  67320;     Ultrasound:     0     mins  77169;    Ionto                               0    mins   95405  Self-care  __0__ mins 49411    Un-Timed:  Electrical Stimulation:    0     mins  93537 (MC );  Traction     0     mins  91188      Hot pack     0     Mins    Cold pack                       0     Mins      Timed Treatment:   28   mins   Total Treatment:     28   mins

## 2022-04-22 RX ORDER — MONTELUKAST SODIUM 10 MG/1
TABLET ORAL
Qty: 90 TABLET | Refills: 0 | Status: SHIPPED | OUTPATIENT
Start: 2022-04-22 | End: 2022-08-16 | Stop reason: SDUPTHER

## 2022-04-22 RX ORDER — FLUTICASONE PROPIONATE 50 MCG
SPRAY, SUSPENSION (ML) NASAL
Qty: 16 G | Refills: 3 | Status: SHIPPED | OUTPATIENT
Start: 2022-04-22 | End: 2022-08-16 | Stop reason: SDUPTHER

## 2022-04-24 RX ORDER — BACLOFEN 20 MG/1
TABLET ORAL
Qty: 90 TABLET | Refills: 0 | Status: SHIPPED | OUTPATIENT
Start: 2022-04-24 | End: 2022-06-08

## 2022-05-20 ENCOUNTER — OFFICE VISIT (OUTPATIENT)
Dept: OBSTETRICS AND GYNECOLOGY | Facility: CLINIC | Age: 47
End: 2022-05-20

## 2022-05-20 ENCOUNTER — TELEPHONE (OUTPATIENT)
Dept: OBSTETRICS AND GYNECOLOGY | Facility: CLINIC | Age: 47
End: 2022-05-20

## 2022-05-20 VITALS
HEART RATE: 121 BPM | BODY MASS INDEX: 28.62 KG/M2 | DIASTOLIC BLOOD PRESSURE: 85 MMHG | WEIGHT: 180 LBS | SYSTOLIC BLOOD PRESSURE: 118 MMHG

## 2022-05-20 DIAGNOSIS — R63.5 WEIGHT GAIN: ICD-10-CM

## 2022-05-20 DIAGNOSIS — N93.9 ABNORMAL UTERINE BLEEDING (AUB): Primary | ICD-10-CM

## 2022-05-20 LAB
B-HCG UR QL: NEGATIVE
C TRACH RRNA CVX QL NAA+PROBE: NOT DETECTED
CANDIDA SPECIES: NEGATIVE
DEPRECATED RDW RBC AUTO: 42 FL (ref 37–54)
ERYTHROCYTE [DISTWIDTH] IN BLOOD BY AUTOMATED COUNT: 13.7 % (ref 12.3–15.4)
EXPIRATION DATE: NORMAL
GARDNERELLA VAGINALIS: POSITIVE
HCT VFR BLD AUTO: 46.4 % (ref 34–46.6)
HGB BLD-MCNC: 15.5 G/DL (ref 12–15.9)
INTERNAL NEGATIVE CONTROL: NEGATIVE
INTERNAL POSITIVE CONTROL: POSITIVE
Lab: NORMAL
MCH RBC QN AUTO: 28.5 PG (ref 26.6–33)
MCHC RBC AUTO-ENTMCNC: 33.4 G/DL (ref 31.5–35.7)
MCV RBC AUTO: 85.5 FL (ref 79–97)
N GONORRHOEA RRNA SPEC QL NAA+PROBE: NOT DETECTED
PLATELET # BLD AUTO: 315 10*3/MM3 (ref 140–450)
PMV BLD AUTO: 11 FL (ref 6–12)
RBC # BLD AUTO: 5.43 10*6/MM3 (ref 3.77–5.28)
T VAGINALIS DNA VAG QL PROBE+SIG AMP: NEGATIVE
T4 FREE SERPL-MCNC: 1.03 NG/DL (ref 0.93–1.7)
TSH SERPL DL<=0.05 MIU/L-ACNC: 1.11 UIU/ML (ref 0.27–4.2)
WBC NRBC COR # BLD: 6.17 10*3/MM3 (ref 3.4–10.8)

## 2022-05-20 PROCEDURE — 85027 COMPLETE CBC AUTOMATED: CPT | Performed by: NURSE PRACTITIONER

## 2022-05-20 PROCEDURE — 87491 CHLMYD TRACH DNA AMP PROBE: CPT | Performed by: NURSE PRACTITIONER

## 2022-05-20 PROCEDURE — 87591 N.GONORRHOEAE DNA AMP PROB: CPT | Performed by: NURSE PRACTITIONER

## 2022-05-20 PROCEDURE — 87480 CANDIDA DNA DIR PROBE: CPT | Performed by: NURSE PRACTITIONER

## 2022-05-20 PROCEDURE — 87660 TRICHOMONAS VAGIN DIR PROBE: CPT | Performed by: NURSE PRACTITIONER

## 2022-05-20 PROCEDURE — 81025 URINE PREGNANCY TEST: CPT | Performed by: NURSE PRACTITIONER

## 2022-05-20 PROCEDURE — 99214 OFFICE O/P EST MOD 30 MIN: CPT | Performed by: NURSE PRACTITIONER

## 2022-05-20 PROCEDURE — 84439 ASSAY OF FREE THYROXINE: CPT | Performed by: NURSE PRACTITIONER

## 2022-05-20 PROCEDURE — 87510 GARDNER VAG DNA DIR PROBE: CPT | Performed by: NURSE PRACTITIONER

## 2022-05-20 PROCEDURE — 84443 ASSAY THYROID STIM HORMONE: CPT | Performed by: NURSE PRACTITIONER

## 2022-05-20 RX ORDER — METRONIDAZOLE 500 MG/1
500 TABLET ORAL 2 TIMES DAILY
Qty: 14 TABLET | Refills: 0 | Status: SHIPPED | OUTPATIENT
Start: 2022-05-20 | End: 2022-05-27

## 2022-05-20 NOTE — PROGRESS NOTES
GYN Problem/Follow Up Visit    Chief Complaint   Patient presents with   • AUB           HPI  Mallory Mata is a 46 y.o. female, , who presents for irregular menses for the past 3 months, Intermenstrual spotting up to 2 weeks,previously menses monthly, lasting 4 days, on heavy days change products every 4 hours. No menstrual cramps. Had been under significant stress.   No IMB since  pound weight gain over 3 months    Additional OB/GYN History   No LMP recorded (lmp unknown).  Current contraception: contraceptive methods: None  Desires to: do not start contraception  Allergies : Patient has no known allergies.     The additional following portions of the patient's history were reviewed and updated as appropriate: allergies, current medications, past family history, past medical history, past social history, past surgical history and problem list.    Review of Systems    I have reviewed and agree with the HPI, ROS, and historical information as entered above. Mayra Osorio, APRN    Objective   /85   Pulse (!) 121   Wt 81.6 kg (180 lb)   LMP  (LMP Unknown)   BMI 28.62 kg/m²     Physical Exam  Vitals and nursing note reviewed. Exam conducted with a chaperone present.   Constitutional:       Appearance: Normal appearance.   Neck:      Thyroid: No thyromegaly.   Cardiovascular:      Rate and Rhythm: Normal rate and regular rhythm.      Heart sounds: Normal heart sounds.   Pulmonary:      Effort: Pulmonary effort is normal.      Breath sounds: Normal breath sounds.   Abdominal:      General: There is no distension.      Palpations: Abdomen is soft.      Tenderness: There is no right CVA tenderness or left CVA tenderness.   Genitourinary:     General: Normal vulva.      Vagina: Normal.      Cervix: Normal.      Uterus: Normal.       Adnexa: Right adnexa normal and left adnexa normal.   Lymphadenopathy:      Lower Body: No right inguinal adenopathy. No left inguinal adenopathy.   Skin:      General: Skin is warm and dry.   Neurological:      Mental Status: She is alert and oriented to person, place, and time.            Assessment and Plan    Diagnoses and all orders for this visit:    1. Abnormal uterine bleeding (AUB) (Primary)  -     Chlamydia trachomatis, Neisseria gonorrhoeae, PCR - Swab, Cervix  -     Gardnerella vaginalis, Trichomonas vaginalis, Candida albicans, DNA - Swab, Vagina  -     US Non-ob Transvaginal; Future  -     T4, Free  -     TSH  -     CBC (No Diff)  -     POC Pregnancy, Urine    2. Weight gain  -     T4, Free  -     TSH  -     POC Pregnancy, Urine      HCG, Urine, QL   Date Value Ref Range Status   05/20/2022 Negative Negative Final      Counseling:  • Await labs, ultrasound, schedule pap        She understands the importance of having the above orders performed in a timely fashion.  The risks of not performing them include, but are not limited to, cancer and/or subsequent increase in morbidity and/or mortality.  She is encouraged to review her results online and/or contact or office if she has questions.     Follow Up:  Return for after ultrasound for WWE/fu labs/ultrasound .  Smoking cessation  \NEAL White  05/20/2022

## 2022-05-20 NOTE — TELEPHONE ENCOUNTER
Patient advised vaginits panel showed BV and that treatment has been sent to her pharmacy.  Patient advised to call if symptoms persist or worsen. Patient verbalizes understanding.

## 2022-05-20 NOTE — TELEPHONE ENCOUNTER
----- Message from NEAL Ghosh sent at 5/20/2022  4:09 PM EDT -----  Incidental finding Bacterial Vaginosis, treatment sent to pharmacy. Follow up if symptoms persist or worsen.

## 2022-06-08 RX ORDER — BACLOFEN 20 MG/1
TABLET ORAL
Qty: 90 TABLET | Refills: 0 | Status: SHIPPED | OUTPATIENT
Start: 2022-06-08 | End: 2022-07-19

## 2022-06-09 ENCOUNTER — HOSPITAL ENCOUNTER (OUTPATIENT)
Dept: ULTRASOUND IMAGING | Facility: HOSPITAL | Age: 47
Discharge: HOME | End: 2022-06-09
Admitting: NURSE PRACTITIONER

## 2022-06-09 DIAGNOSIS — N93.9 ABNORMAL UTERINE BLEEDING (AUB): ICD-10-CM

## 2022-06-09 PROCEDURE — 76830 TRANSVAGINAL US NON-OB: CPT

## 2022-06-10 ENCOUNTER — OFFICE VISIT (OUTPATIENT)
Dept: OBSTETRICS AND GYNECOLOGY | Facility: CLINIC | Age: 47
End: 2022-06-10

## 2022-06-10 VITALS
DIASTOLIC BLOOD PRESSURE: 66 MMHG | SYSTOLIC BLOOD PRESSURE: 114 MMHG | BODY MASS INDEX: 29.03 KG/M2 | HEART RATE: 96 BPM | WEIGHT: 185 LBS | HEIGHT: 67 IN

## 2022-06-10 DIAGNOSIS — N92.3 INTERMENSTRUAL BLEEDING: Primary | ICD-10-CM

## 2022-06-10 PROCEDURE — 99212 OFFICE O/P EST SF 10 MIN: CPT | Performed by: NURSE PRACTITIONER

## 2022-06-10 NOTE — PROGRESS NOTES
"GYN Problem/Follow Up Visit    Chief Complaint   Patient presents with   • F/U US and Labs     Pt would like to schedule WWE w/ PCP, unable to get on exam table due to foot surgery           HPI  Mallory Mata is a 47 y.o. female, , who presents for FU test results, hx intermenstrual bleeding month of March, April, normal menses May, May menses light lasting 4-5 days. No pelvic pain. Had been under increased stress.    Labwork normal CBC, TSH  Pelvic ultrasound: 2 hypoechoic masses along uterine fundus measuring 4.3 cm, likely fibroids, bilateral adnexal cysts measuring up to 2.3 cm on the left likely physiologic.  Stripe 5.4 mm.    Additional OB/GYN History   Patient's last menstrual period was 2022 (approximate).  Current contraception: contraceptive methods: Condoms  Desires to: do not start contraception  Allergies : Patient has no known allergies.     The additional following portions of the patient's history were reviewed and updated as appropriate: allergies, current medications, past family history, past medical history, past social history, past surgical history and problem list.    Review of Systems    I have reviewed and agree with the HPI, ROS, and historical information as entered above. Mayra Osorio, APRN    Objective   /66   Pulse 96   Ht 168.9 cm (66.5\")   Wt 83.9 kg (185 lb)   LMP 2022 (Approximate)   Breastfeeding No   BMI 29.41 kg/m²     Physical Exam  Vitals and nursing note reviewed.   Constitutional:       Appearance: Normal appearance. She is well-developed and well-groomed.   Cardiovascular:      Rate and Rhythm: Normal rate.   Pulmonary:      Effort: Pulmonary effort is normal.   Skin:     General: Skin is warm and dry.   Neurological:      Mental Status: She is alert and oriented to person, place, and time.   Psychiatric:         Mood and Affect: Affect normal.         Cognition and Memory: Cognition normal.          Assessment and Plan    Diagnoses and all " orders for this visit:    1. Intermenstrual bleeding (Primary)(resolved)      Counseling: Counseled regarding management of IMB, hormones, surgical, expectant. Desires expectant management, will fu if menses frequent, less than 21 days, lasting longer than 7 days, heavy or painful.   • Safe Sex/Condoms  • PNV   • Recommend smoking cessation    She understands the importance of having the above orders performed in a timely fashion.  The risks of not performing them include, but are not limited to, cancer and/or subsequent increase in morbidity and/or mortality.  She is encouraged to review her results online and/or contact or office if she has questions.     Follow Up:  Return for WWE, plans to pursue with PCP.        Mayra Osorio, NEAL  06/10/2022

## 2022-07-11 RX ORDER — BACLOFEN 20 MG/1
TABLET ORAL
Qty: 270 TABLET | Refills: 0 | OUTPATIENT
Start: 2022-07-11

## 2022-07-11 NOTE — TELEPHONE ENCOUNTER
It looks like this refill was sent in 1 month ago for 270 tablets.  It has been refused as she should not need this medication any further at this time.

## 2022-07-19 RX ORDER — BACLOFEN 20 MG/1
TABLET ORAL
Qty: 90 TABLET | Refills: 0 | Status: SHIPPED | OUTPATIENT
Start: 2022-07-19 | End: 2022-08-16 | Stop reason: SDUPTHER

## 2022-08-04 ENCOUNTER — TRANSCRIBE ORDERS (OUTPATIENT)
Dept: ADMINISTRATIVE | Facility: HOSPITAL | Age: 47
End: 2022-08-04

## 2022-08-04 DIAGNOSIS — R25.2 SPASTICITY: Primary | ICD-10-CM

## 2022-08-15 ENCOUNTER — OFFICE VISIT (OUTPATIENT)
Dept: INTERNAL MEDICINE | Facility: CLINIC | Age: 47
End: 2022-08-15

## 2022-08-15 VITALS
SYSTOLIC BLOOD PRESSURE: 124 MMHG | HEART RATE: 88 BPM | OXYGEN SATURATION: 99 % | BODY MASS INDEX: 30.29 KG/M2 | HEIGHT: 67 IN | WEIGHT: 193 LBS | TEMPERATURE: 99.3 F | DIASTOLIC BLOOD PRESSURE: 80 MMHG

## 2022-08-15 DIAGNOSIS — E55.9 VITAMIN D DEFICIENCY, UNSPECIFIED: ICD-10-CM

## 2022-08-15 DIAGNOSIS — R79.9 ABNORMAL FINDING OF BLOOD CHEMISTRY, UNSPECIFIED: ICD-10-CM

## 2022-08-15 DIAGNOSIS — R68.89 FLU-LIKE SYMPTOMS: Primary | ICD-10-CM

## 2022-08-15 DIAGNOSIS — R53.83 FATIGUE, UNSPECIFIED TYPE: ICD-10-CM

## 2022-08-15 LAB
25(OH)D3 SERPL-MCNC: 45.6 NG/ML (ref 30–100)
BASOPHILS # BLD AUTO: 0.08 10*3/MM3 (ref 0–0.2)
BASOPHILS NFR BLD AUTO: 1.1 % (ref 0–1.5)
DEPRECATED RDW RBC AUTO: 39.9 FL (ref 37–54)
EOSINOPHIL # BLD AUTO: 0.24 10*3/MM3 (ref 0–0.4)
EOSINOPHIL NFR BLD AUTO: 3.2 % (ref 0.3–6.2)
ERYTHROCYTE [DISTWIDTH] IN BLOOD BY AUTOMATED COUNT: 13.1 % (ref 12.3–15.4)
EXPIRATION DATE: NORMAL
EXPIRATION DATE: NORMAL
FLUAV AG NPH QL: NEGATIVE
FLUBV AG NPH QL: NEGATIVE
FOLATE SERPL-MCNC: >20 NG/ML (ref 4.78–24.2)
HCT VFR BLD AUTO: 43.9 % (ref 34–46.6)
HGB BLD-MCNC: 15 G/DL (ref 12–15.9)
IMM GRANULOCYTES # BLD AUTO: 0.01 10*3/MM3 (ref 0–0.05)
IMM GRANULOCYTES NFR BLD AUTO: 0.1 % (ref 0–0.5)
INTERNAL CONTROL: NORMAL
INTERNAL CONTROL: NORMAL
LYMPHOCYTES # BLD AUTO: 3.03 10*3/MM3 (ref 0.7–3.1)
LYMPHOCYTES NFR BLD AUTO: 40.7 % (ref 19.6–45.3)
Lab: NORMAL
Lab: NORMAL
MCH RBC QN AUTO: 28.9 PG (ref 26.6–33)
MCHC RBC AUTO-ENTMCNC: 34.2 G/DL (ref 31.5–35.7)
MCV RBC AUTO: 84.6 FL (ref 79–97)
MONOCYTES # BLD AUTO: 0.45 10*3/MM3 (ref 0.1–0.9)
MONOCYTES NFR BLD AUTO: 6 % (ref 5–12)
NEUTROPHILS NFR BLD AUTO: 3.64 10*3/MM3 (ref 1.7–7)
NEUTROPHILS NFR BLD AUTO: 48.9 % (ref 42.7–76)
NRBC BLD AUTO-RTO: 0 /100 WBC (ref 0–0.2)
PLATELET # BLD AUTO: 272 10*3/MM3 (ref 140–450)
PMV BLD AUTO: 11.1 FL (ref 6–12)
RBC # BLD AUTO: 5.19 10*6/MM3 (ref 3.77–5.28)
SARS-COV-2 AG UPPER RESP QL IA.RAPID: NOT DETECTED
VIT B12 BLD-MCNC: 1132 PG/ML (ref 211–946)
WBC NRBC COR # BLD: 7.45 10*3/MM3 (ref 3.4–10.8)

## 2022-08-15 PROCEDURE — 87804 INFLUENZA ASSAY W/OPTIC: CPT | Performed by: PHYSICIAN ASSISTANT

## 2022-08-15 PROCEDURE — 99213 OFFICE O/P EST LOW 20 MIN: CPT | Performed by: PHYSICIAN ASSISTANT

## 2022-08-15 PROCEDURE — 83540 ASSAY OF IRON: CPT | Performed by: PHYSICIAN ASSISTANT

## 2022-08-15 PROCEDURE — 85025 COMPLETE CBC W/AUTO DIFF WBC: CPT | Performed by: PHYSICIAN ASSISTANT

## 2022-08-15 PROCEDURE — 82607 VITAMIN B-12: CPT | Performed by: PHYSICIAN ASSISTANT

## 2022-08-15 PROCEDURE — 82746 ASSAY OF FOLIC ACID SERUM: CPT | Performed by: PHYSICIAN ASSISTANT

## 2022-08-15 PROCEDURE — 86645 CMV ANTIBODY IGM: CPT | Performed by: PHYSICIAN ASSISTANT

## 2022-08-15 PROCEDURE — 86665 EPSTEIN-BARR CAPSID VCA: CPT | Performed by: PHYSICIAN ASSISTANT

## 2022-08-15 PROCEDURE — U0004 COV-19 TEST NON-CDC HGH THRU: HCPCS | Performed by: PHYSICIAN ASSISTANT

## 2022-08-15 PROCEDURE — 82306 VITAMIN D 25 HYDROXY: CPT | Performed by: PHYSICIAN ASSISTANT

## 2022-08-15 PROCEDURE — 84466 ASSAY OF TRANSFERRIN: CPT | Performed by: PHYSICIAN ASSISTANT

## 2022-08-15 PROCEDURE — 80053 COMPREHEN METABOLIC PANEL: CPT | Performed by: PHYSICIAN ASSISTANT

## 2022-08-15 PROCEDURE — 86664 EPSTEIN-BARR NUCLEAR ANTIGEN: CPT | Performed by: PHYSICIAN ASSISTANT

## 2022-08-15 PROCEDURE — 86644 CMV ANTIBODY: CPT | Performed by: PHYSICIAN ASSISTANT

## 2022-08-15 PROCEDURE — 87426 SARSCOV CORONAVIRUS AG IA: CPT | Performed by: PHYSICIAN ASSISTANT

## 2022-08-15 RX ORDER — TOPIRAMATE 50 MG/1
50 TABLET, FILM COATED ORAL
COMMUNITY
End: 2022-08-16 | Stop reason: SDUPTHER

## 2022-08-15 NOTE — ASSESSMENT & PLAN NOTE
Likely viral illness causing symptoms.  Would expect symptoms to be self limiting within the next few days. Will send covid PCR.

## 2022-08-15 NOTE — PROGRESS NOTES
"Chief Complaint  Fatigue (Pt reports high BP read 158/101 today 08/15/2022.)    Subjective          Mallory Mata presents to [facility]  Fatigue- symptoms started about 5 days ago.  Patient states that she has had this problem before and her blood pressure was too low previously when this happened.  She has had some runny nose and cough.  These symptoms have been going on for about a week.  No sick contacts that she is aware of.  No fevers.  No chest pain.       Objective   Vital Signs:   /80 (BP Location: Right arm, Patient Position: Sitting, Cuff Size: Adult)   Pulse 88   Temp 99.3 °F (37.4 °C) (Oral)   Ht 168.9 cm (66.5\")   Wt 87.5 kg (193 lb)   SpO2 99%   BMI 30.68 kg/m²     Physical Exam  Vitals reviewed.   Constitutional:       Appearance: Normal appearance. She is well-developed.   HENT:      Head: Normocephalic and atraumatic.      Right Ear: Tympanic membrane, ear canal and external ear normal.      Left Ear: Tympanic membrane, ear canal and external ear normal.      Mouth/Throat:      Mouth: Mucous membranes are moist.      Pharynx: Posterior oropharyngeal erythema present.   Eyes:      Conjunctiva/sclera: Conjunctivae normal.      Pupils: Pupils are equal, round, and reactive to light.   Cardiovascular:      Rate and Rhythm: Normal rate and regular rhythm.      Heart sounds: No murmur heard.    No friction rub. No gallop.   Pulmonary:      Effort: Pulmonary effort is normal.      Breath sounds: Normal breath sounds. No wheezing or rhonchi.   Musculoskeletal:      Cervical back: No tenderness.   Lymphadenopathy:      Cervical: No cervical adenopathy.   Skin:     General: Skin is warm and dry.   Neurological:      Mental Status: She is alert and oriented to person, place, and time.      Cranial Nerves: No cranial nerve deficit.   Psychiatric:         Mood and Affect: Mood and affect normal.         Behavior: Behavior normal.         Thought " Content: Thought content normal.         Judgment: Judgment normal.        Result Review :          Procedures      Assessment and Plan    Diagnoses and all orders for this visit:    1. Flu-like symptoms (Primary)  Assessment & Plan:  Likely viral illness causing symptoms.  Would expect symptoms to be self limiting within the next few days. Will send covid PCR.     Orders:  -     POCT Influenza A/B  -     POCT SARS-CoV-2 Antigen PIPER  -     COVID-19,APTIMA PANTHER(MAURISIO),[facility] TRAM, NP/OP SWAB IN UTM/VTM/SALINE TRANSPORT MEDIA,24 HR TAT - Swab, Nasopharynx; Future  -     COVID-19,APTIMA PANTHER(MAURISIO),[facility] TRAM, NP/OP SWAB IN UTM/VTM/SALINE TRANSPORT MEDIA,24 HR TAT - Swab, Nasopharynx    2. Fatigue, unspecified type  Assessment & Plan:  Likely viral etiology but will check other labs that could be contributing to symptoms.      Orders:  -     CBC w AUTO Differential  -     Iron and TIBC  -     Vitamin B12 & Folate  -     Comprehensive metabolic panel  -     Vitamin D 25 hydroxy  -     EBV Antibody Profile  -     Cytomegalovirus Antibody, IgG  -     Cytomegalovirus Antibody, IgM    3. Abnormal finding of blood chemistry, unspecified   -     Iron and TIBC    4. Vitamin D deficiency, unspecified   -     Vitamin D 25 hydroxy            Follow Up   Return if symptoms worsen or fail to improve.  Patient was given instructions and counseling regarding her condition or for health maintenance advice. Please see specific information pulled into the AVS if appropriate.

## 2022-08-16 LAB
ALBUMIN SERPL-MCNC: 5 G/DL (ref 3.5–5.2)
ALBUMIN/GLOB SERPL: 1.8 G/DL
ALP SERPL-CCNC: 76 U/L (ref 39–117)
ALT SERPL W P-5'-P-CCNC: 19 U/L (ref 1–33)
ANION GAP SERPL CALCULATED.3IONS-SCNC: 14.2 MMOL/L (ref 5–15)
AST SERPL-CCNC: 12 U/L (ref 1–32)
BILIRUB SERPL-MCNC: 0.2 MG/DL (ref 0–1.2)
BUN SERPL-MCNC: 11 MG/DL (ref 6–20)
BUN/CREAT SERPL: 13.6 (ref 7–25)
CALCIUM SPEC-SCNC: 10.1 MG/DL (ref 8.6–10.5)
CHLORIDE SERPL-SCNC: 103 MMOL/L (ref 98–107)
CO2 SERPL-SCNC: 19.8 MMOL/L (ref 22–29)
CREAT SERPL-MCNC: 0.81 MG/DL (ref 0.57–1)
EGFRCR SERPLBLD CKD-EPI 2021: 91.4 ML/MIN/1.73
GLOBULIN UR ELPH-MCNC: 2.8 GM/DL
GLUCOSE SERPL-MCNC: 78 MG/DL (ref 65–99)
IRON 24H UR-MRATE: 79 MCG/DL (ref 37–145)
IRON SATN MFR SERPL: 16 % (ref 20–50)
POTASSIUM SERPL-SCNC: 3.9 MMOL/L (ref 3.5–5.2)
PROT SERPL-MCNC: 7.8 G/DL (ref 6–8.5)
SARS-COV-2 RNA PNL SPEC NAA+PROBE: NOT DETECTED
SODIUM SERPL-SCNC: 137 MMOL/L (ref 136–145)
TIBC SERPL-MCNC: 496 MCG/DL (ref 298–536)
TRANSFERRIN SERPL-MCNC: 333 MG/DL (ref 200–360)

## 2022-08-16 RX ORDER — NAPROXEN 500 MG/1
500 TABLET ORAL 2 TIMES DAILY WITH MEALS
Qty: 120 TABLET | Refills: 0 | OUTPATIENT
Start: 2022-08-16

## 2022-08-16 RX ORDER — MONTELUKAST SODIUM 10 MG/1
10 TABLET ORAL DAILY
Qty: 90 TABLET | Refills: 1 | Status: SHIPPED | OUTPATIENT
Start: 2022-08-16 | End: 2023-02-27 | Stop reason: SDUPTHER

## 2022-08-16 RX ORDER — NAPROXEN 500 MG/1
TABLET ORAL
Qty: 120 TABLET | Refills: 0 | Status: SHIPPED | OUTPATIENT
Start: 2022-08-16 | End: 2022-11-15

## 2022-08-16 RX ORDER — FLUTICASONE PROPIONATE 50 MCG
2 SPRAY, SUSPENSION (ML) NASAL DAILY
Qty: 16 G | Refills: 3 | Status: SHIPPED | OUTPATIENT
Start: 2022-08-16 | End: 2023-02-27 | Stop reason: SDUPTHER

## 2022-08-16 RX ORDER — BACLOFEN 20 MG/1
20 TABLET ORAL 3 TIMES DAILY
Qty: 90 TABLET | Refills: 0 | Status: SHIPPED | OUTPATIENT
Start: 2022-08-16 | End: 2022-10-04 | Stop reason: SDUPTHER

## 2022-08-16 RX ORDER — TOPIRAMATE 50 MG/1
50 TABLET, FILM COATED ORAL 2 TIMES DAILY
Qty: 180 TABLET | Refills: 1 | Status: SHIPPED | OUTPATIENT
Start: 2022-08-16 | End: 2022-10-18 | Stop reason: SDUPTHER

## 2022-08-16 NOTE — TELEPHONE ENCOUNTER
Caller: Mallory Mata YVONNE    Relationship: Self    Best call back number: [phone]    Requested Prescriptions:   Requested Prescriptions     Pending Prescriptions Disp Refills   • naproxen (NAPROSYN) 500 MG tablet 120 tablet 0     Sig: Take 1 tablet by mouth 2 (Two) Times a Day With Meals.   • montelukast (SINGULAIR) 10 MG tablet 90 tablet 0     Sig: Take 1 tablet by mouth Daily.   • fluticasone (FLONASE) 50 MCG/ACT nasal spray 16 g 3     Si sprays by Each Nare route Daily.   • baclofen (LIORESAL) 20 MG tablet 90 tablet 0     Sig: Take 1 tablet by mouth 3 (Three) Times a Day.   • topiramate (TOPAMAX) 50 MG tablet       Sig: Take 1 tablet by mouth.        Pharmacy where request should be sent: [address] [place] [facility] [phone] [facility] [phone] FX     Additional details provided by patient: PATIENT STATED THAT SHE IS OUT OF NAPROXEN AND WANTING 90 DAY SUPPLY OF ALL REFILLS     Does the patient have less than a 3 day supply:  [x] Yes  [] No    Annie WATTS Rep   22 10:32 EDT

## 2022-08-17 LAB
CMV IGG SERPL IA-ACNC: 3.9 U/ML (ref 0–0.59)
CMV IGM SERPL IA-ACNC: <30 AU/ML (ref 0–29.9)
EBV NA IGG SER IA-ACNC: >600 U/ML (ref 0–17.9)
EBV VCA IGG SER IA-ACNC: >600 U/ML (ref 0–17.9)
EBV VCA IGM SER IA-ACNC: <36 U/ML (ref 0–35.9)
SERVICE CMNT-IMP: ABNORMAL

## 2022-09-04 PROCEDURE — U0004 COV-19 TEST NON-CDC HGH THRU: HCPCS | Performed by: FAMILY MEDICINE

## 2022-10-03 PROCEDURE — U0004 COV-19 TEST NON-CDC HGH THRU: HCPCS | Performed by: EMERGENCY MEDICINE

## 2022-10-04 NOTE — TELEPHONE ENCOUNTER
Caller: Mallory Mata    Relationship: Self    Best call back number: [phone]    Requested Prescriptions:   Requested Prescriptions     Pending Prescriptions Disp Refills   • baclofen (LIORESAL) 20 MG tablet 90 tablet 0     Sig: Take 1 tablet by mouth 3 (Three) Times a Day.        [address] [facility] [phone]  - [phone] FX     Additional details provided by patient:PATIENT IS COMPLETELY OUT OF MEDICATION. PLEASE CALL NEW PRESCRIPTION INTO PHARMACY WITH REFILLS ASAP TODAY.    Does the patient have less than a 3 day supply:  [x] Yes  [] No    Annie Story Rep   10/04/22 09:56 EDT

## 2022-10-11 RX ORDER — BACLOFEN 20 MG/1
TABLET ORAL
Qty: 90 TABLET | Refills: 0 | Status: SHIPPED | OUTPATIENT
Start: 2022-10-11 | End: 2022-12-27

## 2022-10-11 RX ORDER — BACLOFEN 20 MG/1
20 TABLET ORAL 3 TIMES DAILY
Qty: 90 TABLET | Refills: 0 | Status: SHIPPED | OUTPATIENT
Start: 2022-10-11 | End: 2022-10-11

## 2022-10-11 NOTE — TELEPHONE ENCOUNTER
Pt was seen on 8/15/22 by Yvette Mcmahon for sick visit. It has been since 2/4/22 since she has had an appointment with you. Is this ok to fill?

## 2022-10-18 ENCOUNTER — OFFICE VISIT (OUTPATIENT)
Dept: INTERNAL MEDICINE | Facility: CLINIC | Age: 47
End: 2022-10-18

## 2022-10-18 VITALS
BODY MASS INDEX: 32.78 KG/M2 | OXYGEN SATURATION: 99 % | HEART RATE: 98 BPM | DIASTOLIC BLOOD PRESSURE: 73 MMHG | HEIGHT: 66 IN | WEIGHT: 204 LBS | SYSTOLIC BLOOD PRESSURE: 126 MMHG

## 2022-10-18 DIAGNOSIS — G47.00 INSOMNIA, UNSPECIFIED TYPE: ICD-10-CM

## 2022-10-18 DIAGNOSIS — F33.42 MAJOR DEPRESSIVE DISORDER, RECURRENT EPISODE, IN FULL REMISSION: ICD-10-CM

## 2022-10-18 DIAGNOSIS — J01.00 ACUTE NON-RECURRENT MAXILLARY SINUSITIS: Primary | ICD-10-CM

## 2022-10-18 DIAGNOSIS — M62.49 CONTRACTURE OF MUSCLE OF MULTIPLE SITES: ICD-10-CM

## 2022-10-18 DIAGNOSIS — E66.9 OBESITY (BMI 30-39.9): ICD-10-CM

## 2022-10-18 PROCEDURE — 90686 IIV4 VACC NO PRSV 0.5 ML IM: CPT | Performed by: INTERNAL MEDICINE

## 2022-10-18 PROCEDURE — G0008 ADMIN INFLUENZA VIRUS VAC: HCPCS | Performed by: INTERNAL MEDICINE

## 2022-10-18 PROCEDURE — 99214 OFFICE O/P EST MOD 30 MIN: CPT | Performed by: INTERNAL MEDICINE

## 2022-10-18 RX ORDER — TOPIRAMATE 25 MG/1
75 TABLET ORAL 2 TIMES DAILY
Qty: 180 TABLET | Refills: 1 | Status: SHIPPED | OUTPATIENT
Start: 2022-10-18 | End: 2023-02-27 | Stop reason: SDUPTHER

## 2022-10-18 RX ORDER — AMOXICILLIN AND CLAVULANATE POTASSIUM 875; 125 MG/1; MG/1
1 TABLET, FILM COATED ORAL 2 TIMES DAILY
Qty: 20 TABLET | Refills: 0 | Status: SHIPPED | OUTPATIENT
Start: 2022-10-18 | End: 2022-10-28

## 2022-10-18 RX ORDER — TRAZODONE HYDROCHLORIDE 50 MG/1
50 TABLET ORAL NIGHTLY
Qty: 90 TABLET | Refills: 1
Start: 2022-10-18 | End: 2023-02-27

## 2022-10-18 NOTE — PROGRESS NOTES
"Chief Complaint  Anxiety and Hypertension    Subjective          Mallory Mata presents to [facility]  History of Present Illness     The patient presents today for a follow-up.     Sinus infection   The patient believes she has a sinus infection. She states she has been having a lot of drainage and phlegm for a few weeks. The patient notes it is going down the back of her throat, however; it started with drainage from her nose.  She denies any fevers. The patient is taking Singulair and Flonase. She denies taking Zyrtec. The patient denies any allergies to medications.     Weight gain   The patient states the Topamax is not helping. She notes she has been taking 2 twice a day. The patient would like to know if there is something else, she can take besides Topamax.      Depression   She notes she has been talking to her therapist every week. And she has been seeing the psychiatrist. The patient states her psychiatrist gave her some more medication to help her sleep at night. She notes she was taking Rexulti and Trintellix. The patient states she was given trazodone 50 mg to help her sleep. She notes the trazadone helps her lay down. She reports that she was up at night and eating.     Skin lesion   The patient states she hit her hand on the bed. She states that it developed a little blood clot. She notes it is painful.     Health maintenance   The patient would like to get her flu shot today.           She states she is still seeing her PM & R doctor to help with injections. The patient states she has an appointment next Tuesday to have her injection done. The patient states she is keeping up with her Botox doctor.           Objective   Vital Signs:   /73   Pulse 98   Ht 167.6 cm (66\")   Wt 92.5 kg (204 lb)   SpO2 99%   BMI 32.93 kg/m²       Generally well-appearing  CV: Regular rate and rhythm with no murmurs rubs or gallops  Respiratory: Clear to " auscultation bilaterally  Musculoskeletal: Left side with contractures but wearing devices to help  Psych: Thought process normal no severe anxiety or distress    Result Review :       Common labs    Common Labs 5/10/22 5/20/22 8/15/22 8/15/22      1721 1721   Glucose    78   BUN    11   Creatinine    0.81   Sodium    137   Potassium    3.9   Chloride    103   Calcium    10.1   Albumin    5.00   Total Bilirubin    0.2   Alkaline Phosphatase    76   AST (SGOT)    12   ALT (SGPT)    19   WBC 7.68 6.17 7.45    Hemoglobin 12.8 15.5 15.0    Hematocrit 39.2 46.4 43.9    Platelets 292 315 272              Results for orders placed or performed during the hospital encounter of 10/03/22   COVID-19,APTIMA PANTHER(MAURISIO), JOSE J/ TRAM, NP/OP SWAB IN UTM/VTM/SALINE TRANSPORT MEDIA,24 HR TAT - Swab, Nasopharynx    Specimen: Nasopharynx; Swab   Result Value Ref Range    COVID19 Not Detected Not Detected - Ref. Range   POCT SARS-CoV-2 Antigen PIPER   ([facility])    Specimen: Swab   Result Value Ref Range    SARS Antigen Not Detected Not Detected, Presumptive Negative    Internal Control Passed Passed    Lot Number [id number]     Expiration Date 07/08/2023    POC Influenza A/B    Specimen: Swab   Result Value Ref Range    Rapid Influenza A Ag Negative Negative    Rapid Influenza B Ag Negative Negative    Internal Control Passed Passed    Lot Number [id number]     Expiration Date 01/23/2024    POC Rapid Strep A    Specimen: Swab   Result Value Ref Range    Rapid Strep A Screen Negative Negative, VALID, INVALID, Not Performed    Internal Control Passed Passed    Lot Number [id number]     Expiration Date 12/31/2023             Procedures        Assessment and Plan    Diagnoses and all orders for this visit:    1. Acute non-recurrent maxillary sinusitis (Primary)        - Prescription for Augmentin 875-125 mg sent to the pharmacy     2. Insomnia, unspecified type  Will try traozdone    3. Major depressive disorder, recurrent  episode, in full remission (HCC)        - The patient will continue to follow up with psychiatry.     4. Contracture of muscle of multiple sites        - She will continue to follow up with her physical medicine  & rehabilitation doctor.    5. Appetite suppression   Increase topamax     Other orders  -     amoxicillin-clavulanate (Augmentin) 875-125 MG per tablet; Take 1 tablet by mouth 2 (Two) Times a Day for 10 days.  Dispense: 20 tablet; Refill: 0  -     traZODone (DESYREL) 50 MG tablet; Take 1 tablet by mouth Every Night.  Dispense: 90 tablet; Refill: 1  -     topiramate (TOPAMAX) 25 MG tablet; Take 3 tablets by mouth 2 (Two) Times a Day.  Dispense: 180 tablet; Refill: 1  -     FluLaval/Fluarix/Fluzone >6 Months                    Transcribed from ambient dictation for Tiffany Salomon MD by Mary Anne Calvin.  10/18/22   11:37 EDT    Patient or patient representative verbalized consent to the visit recording.  I have personally performed the services described in this document as transcribed by the above individual, and it is both accurate and complete.    Follow Up   Return in about 3 months (around 1/18/2023).  Patient was given instructions and counseling regarding her condition or for health maintenance advice. Please see specific information pulled into the AVS if appropriate.       Transcribed from ambient dictation for Tiffany Salomon MD by Mary Anne Calvin.  10/18/22   11:22 EDT    Patient or patient representative verbalized consent to the visit recording.

## 2022-10-20 ENCOUNTER — PRIOR AUTHORIZATION (OUTPATIENT)
Dept: INTERNAL MEDICINE | Facility: CLINIC | Age: 47
End: 2022-10-20

## 2022-11-02 ENCOUNTER — OFFICE VISIT (OUTPATIENT)
Dept: INTERNAL MEDICINE | Facility: CLINIC | Age: 47
End: 2022-11-02

## 2022-11-02 VITALS
WEIGHT: 197 LBS | DIASTOLIC BLOOD PRESSURE: 60 MMHG | SYSTOLIC BLOOD PRESSURE: 138 MMHG | BODY MASS INDEX: 31.8 KG/M2 | OXYGEN SATURATION: 97 % | TEMPERATURE: 97.4 F | HEART RATE: 113 BPM

## 2022-11-02 DIAGNOSIS — J10.1 INFLUENZA A: ICD-10-CM

## 2022-11-02 DIAGNOSIS — R09.81 CONGESTION OF NASAL SINUS: ICD-10-CM

## 2022-11-02 DIAGNOSIS — R68.83 CHILLS (WITHOUT FEVER): Primary | ICD-10-CM

## 2022-11-02 PROBLEM — R79.9 ABNORMAL FINDING OF BLOOD CHEMISTRY, UNSPECIFIED: Status: RESOLVED | Noted: 2022-08-15 | Resolved: 2022-11-02

## 2022-11-02 PROBLEM — R68.89 FLU-LIKE SYMPTOMS: Status: RESOLVED | Noted: 2022-08-15 | Resolved: 2022-11-02

## 2022-11-02 LAB
EXPIRATION DATE: ABNORMAL
FLUAV AG UPPER RESP QL IA.RAPID: DETECTED
FLUBV AG UPPER RESP QL IA.RAPID: NOT DETECTED
INTERNAL CONTROL: ABNORMAL
Lab: ABNORMAL
SARS-COV-2 AG UPPER RESP QL IA.RAPID: NOT DETECTED

## 2022-11-02 PROCEDURE — 87428 SARSCOV & INF VIR A&B AG IA: CPT | Performed by: PHYSICIAN ASSISTANT

## 2022-11-02 PROCEDURE — 99213 OFFICE O/P EST LOW 20 MIN: CPT | Performed by: PHYSICIAN ASSISTANT

## 2022-11-02 RX ORDER — OSELTAMIVIR PHOSPHATE 75 MG/1
75 CAPSULE ORAL 2 TIMES DAILY
Qty: 10 CAPSULE | Refills: 0 | Status: SHIPPED | OUTPATIENT
Start: 2022-11-02 | End: 2022-11-07

## 2022-11-02 NOTE — PROGRESS NOTES
Chief Complaint  Chills (Body aches ), Cough (Hurts to cough x 1 day ), and Nasal Congestion    Subjective          Mallory Mata presents to [facility]  History of Present Illness  Chills, body ahces, cough, nasal congestion x 1 day  Chest feels tight with coughing   Denies wheezing, resp distress  Nasal congestion, sore throat   Pt feeling weak and tired  Energy is low  Appetite is ok.  No sick contacts.   Pt states she has tried peppermint        Past Medical History:   Diagnosis Date   • Allergic rhinitis    • Anxiety    • Contracted ligament     LUE and LLE   • Contracture of muscle 04/18/2016    MULTUPLE SITES, WILL CONT BACLOFEN AND BOTOX   • Cramp in muscle    • Depression    • Heart murmur    • High blood pressure    • Hypertension    • Meningitis 01/08/2016    THIS OCCURED AS A CHILD, BUT SHE HAS A SIGNIFICANT AMOUNT OF CONTRACTURES ON HER LEFT SIDE WILL SCHEDULE PHYSICAL THERAPY REFER TO PM&R START BACLOFEN    • Migraine headache    • Murmur, heart    • Recurrent major depressive disorder, in full remission ([facility]) 09/11/2017    DOING WELL ON TRINTELLIX SAYS IT KEEPS HER SMILING :)    • Rheumatic fever    • Tendinitis of hip, left 10/05/2017   • Trochanteric bursitis of left hip 10/05/2017        Past Surgical History:   Procedure Laterality Date   • OTHER SURGICAL HISTORY Left 1995    BROKEN BONES , LEFT ARM.  1988 & 1995    • OTHER SURGICAL HISTORY      METAL IMPLANTS    • ULNAR COLLATERAL LIGAMENT REPAIR      ligaments released left arm   • WRIST SURGERY  1995    metal plate left wrist        Current Outpatient Medications on File Prior to Visit   Medication Sig Dispense Refill   • acetaminophen (TYLENOL) 500 MG tablet Take 1 tablet by mouth Every 6 (Six) Hours As Needed for Mild Pain. 30 tablet 0   • B Complex Vitamins (VITAMIN B COMPLEX PO) Take  by mouth Daily.     • baclofen (LIORESAL) 20 MG tablet TAKE 1 TABLET BY MOUTH THREE TIMES DAILY 90 tablet 0    • Benzocaine-Menthol (Cepacol) 15-2.3 MG lozenge Dissolve 1 lozenge in the mouth 4 (Four) Times a Day. 16 lozenge 0   • busPIRone (BUSPAR) 15 MG tablet Take 15 mg by mouth 3 (Three) Times a Day.     • fluticasone (FLONASE) 50 MCG/ACT nasal spray 2 sprays by Each Nare route Daily. 16 g 3   • montelukast (SINGULAIR) 10 MG tablet Take 1 tablet by mouth Daily. 90 tablet 1   • Multiple Vitamins-Minerals (HAIR SKIN AND NAILS FORMULA) tablet Take  by mouth Daily.     • naproxen (NAPROSYN) 500 MG tablet TAKE 1 TABLET BY MOUTH TWICE DAILY WITH MEALS 120 tablet 0   • Rexulti 2 MG tablet Take 1 tablet by mouth Daily.     • topiramate (TOPAMAX) 25 MG tablet Take 3 tablets by mouth 2 (Two) Times a Day. 180 tablet 1   • traZODone (DESYREL) 50 MG tablet Take 1 tablet by mouth Every Night. 90 tablet 1   • Trintellix 20 MG tablet        No current facility-administered medications on file prior to visit.        No Known Allergies    Social History     Tobacco Use   Smoking Status Every Day   • Packs/day: 0.50   • Years: 10.00   • Pack years: 5.00   • Types: Cigarettes   • Start date: 3/21/2006   Smokeless Tobacco Never          Objective   Vital Signs:   /60 (BP Location: Left arm)   Pulse 113   Temp 97.4 °F (36.3 °C) (Temporal)   Wt 89.4 kg (197 lb)   SpO2 97%   BMI 31.80 kg/m²     Physical Exam  Vitals reviewed.   Constitutional:       Appearance: Normal appearance.   HENT:      Head: Normocephalic and atraumatic.      Nose: Nose normal.      Mouth/Throat:      Mouth: Mucous membranes are moist.   Eyes:      Extraocular Movements: Extraocular movements intact.      Conjunctiva/sclera: Conjunctivae normal.      Pupils: Pupils are equal, round, and reactive to light.   Cardiovascular:      Rate and Rhythm: Normal rate and regular rhythm.   Pulmonary:      Effort: Pulmonary effort is normal.      Breath sounds: Normal breath sounds.   Abdominal:      General: Abdomen is flat. Bowel sounds are normal.      Palpations:  Abdomen is soft.   Musculoskeletal:         General: Normal range of motion.   Neurological:      General: No focal deficit present.      Mental Status: She is alert and oriented to person, place, and time.   Psychiatric:         Mood and Affect: Mood normal.        Result Review :              Lab Results   Component Value Date    SARSANTIGEN Not Detected 11/02/2022    COVID19 Not Detected 10/03/2022    RAPFLUA Negative 10/03/2022    RAPFLUB Negative 10/03/2022    FLUAAG Detected (A) 11/02/2022    FLUBAG Not Detected 11/02/2022    RAPSCRN Negative 10/03/2022    POCPREGUR Negative 05/20/2022    INR 1.01 (L) 06/05/2019    HGB 15.0 08/15/2022          Assessment and Plan    Diagnoses and all orders for this visit:    1. Chills (without fever) (Primary)  -     POCT SARS-CoV-2 Antigen PIPER    2. Congestion of nasal sinus  -     POCT SARS-CoV-2 Antigen PIPER    3. Influenza A  Assessment & Plan:  Educated patient on the flu and symptoms that are common. Discussed symptomatic treatment. Increase rest, drink plenty of fluids, enough so that your urine is light yellow or clear like water. Alternate tylenol/motrin to relieve fever, headache, and muscle aches.   Wash hands regularly and keep hands away from your face. Stay home from school or work until you are feeling better and your fever has been gone for at least 24 hours. The fever needs to have gone away on its own without the help of medicine.To prevent the flu in the future, get a flu vaccine every fall.     When should you call for help? Call 911 anytime you think you may need emergency care. If you have severe trouble breathing.  Call your doctor now or seek immediate medical care if: You have new or worse trouble breathing. You seem to be getting much sicker. You feel very sleepy or confused. You have a new or higher fever. You get a new rash.        Other orders  -     oseltamivir (Tamiflu) 75 MG capsule; Take 1 capsule by mouth 2 (Two) Times a Day for 5 days.   Dispense: 10 capsule; Refill: 0      Follow Up   Return if symptoms worsen or fail to improve.  Patient was given instructions and counseling regarding her condition or for health maintenance advice. Please see specific information pulled into the AVS if appropriate.

## 2022-11-02 NOTE — ASSESSMENT & PLAN NOTE
Educated patient on the flu and symptoms that are common. Discussed symptomatic treatment. Increase rest, drink plenty of fluids, enough so that your urine is light yellow or clear like water. Alternate tylenol/motrin to relieve fever, headache, and muscle aches.   Wash hands regularly and keep hands away from your face. Stay home from school or work until you are feeling better and your fever has been gone for at least 24 hours. The fever needs to have gone away on its own without the help of medicine.To prevent the flu in the future, get a flu vaccine every fall.     When should you call for help? Call 911 anytime you think you may need emergency care. If you have severe trouble breathing.  Call your doctor now or seek immediate medical care if: You have new or worse trouble breathing. You seem to be getting much sicker. You feel very sleepy or confused. You have a new or higher fever. You get a new rash.

## 2022-11-15 RX ORDER — NAPROXEN 500 MG/1
TABLET ORAL
Qty: 120 TABLET | Refills: 0 | Status: SHIPPED | OUTPATIENT
Start: 2022-11-15 | End: 2023-01-31

## 2022-12-27 RX ORDER — BACLOFEN 20 MG/1
TABLET ORAL
Qty: 90 TABLET | Refills: 0 | Status: SHIPPED | OUTPATIENT
Start: 2022-12-27 | End: 2022-12-28 | Stop reason: SDUPTHER

## 2022-12-28 ENCOUNTER — OFFICE VISIT (OUTPATIENT)
Dept: INTERNAL MEDICINE | Facility: CLINIC | Age: 47
End: 2022-12-28

## 2022-12-28 VITALS
BODY MASS INDEX: 33.15 KG/M2 | HEART RATE: 120 BPM | OXYGEN SATURATION: 98 % | TEMPERATURE: 99.2 F | DIASTOLIC BLOOD PRESSURE: 90 MMHG | WEIGHT: 205.4 LBS | SYSTOLIC BLOOD PRESSURE: 140 MMHG

## 2022-12-28 DIAGNOSIS — M79.89 ARM SWELLING: Primary | ICD-10-CM

## 2022-12-28 LAB
CHROMATIN AB SERPL-ACNC: <10 IU/ML (ref 0–14)
CRP SERPL-MCNC: <0.3 MG/DL (ref 0–0.5)
URATE SERPL-MCNC: 3.4 MG/DL (ref 2.4–5.7)

## 2022-12-28 PROCEDURE — 86225 DNA ANTIBODY NATIVE: CPT | Performed by: PHYSICIAN ASSISTANT

## 2022-12-28 PROCEDURE — 86431 RHEUMATOID FACTOR QUANT: CPT | Performed by: PHYSICIAN ASSISTANT

## 2022-12-28 PROCEDURE — 86063 ANTISTREPTOLYSIN O SCREEN: CPT | Performed by: PHYSICIAN ASSISTANT

## 2022-12-28 PROCEDURE — 86038 ANTINUCLEAR ANTIBODIES: CPT | Performed by: PHYSICIAN ASSISTANT

## 2022-12-28 PROCEDURE — 84550 ASSAY OF BLOOD/URIC ACID: CPT | Performed by: PHYSICIAN ASSISTANT

## 2022-12-28 PROCEDURE — 99213 OFFICE O/P EST LOW 20 MIN: CPT | Performed by: PHYSICIAN ASSISTANT

## 2022-12-28 PROCEDURE — 36415 COLL VENOUS BLD VENIPUNCTURE: CPT | Performed by: PHYSICIAN ASSISTANT

## 2022-12-28 PROCEDURE — 86140 C-REACTIVE PROTEIN: CPT | Performed by: PHYSICIAN ASSISTANT

## 2022-12-28 RX ORDER — BACLOFEN 20 MG/1
20 TABLET ORAL 3 TIMES DAILY
Qty: 90 TABLET | Refills: 0 | Status: SHIPPED | OUTPATIENT
Start: 2022-12-28 | End: 2023-03-10

## 2022-12-28 NOTE — ASSESSMENT & PLAN NOTE
Uncertain etiology.  Will check labs for inflammatory markers/autoimmune processes that could be causing joint inflammation.  We will go ahead and get vascular ultrasound of right upper extremity to rule out blood clot.  Will send to orthopedic surgeon for reevaluation of shoulder and possible chronic rotator cuff injury.

## 2022-12-28 NOTE — PROGRESS NOTES
Chief Complaint  Arm Swelling (Right arm swelling from neck down, started swelling today started hurting yesterday laid down to take a nap and it swelled over night to the point that you cant see the knuckles )    Subjective          Mallory Mata presents to [facility]  History of Present Illness  Right arm swelling- patient noticed some pain in her arm yesterday.  When she woke up this morning she noticed swelling in her arm and hand.  Patient states this has not happened before.  She denies injury or trauma.  Patient states her shoulder is somewhat painful.  She also states that she has history of a rotator cuff injury.  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  She has not been on any new medications.      Objective   Vital Signs:   /90 (BP Location: Right arm, Patient Position: Sitting, Cuff Size: Adult)   Pulse 120   Temp 99.2 °F (37.3 °C) (Temporal)   Wt 93.2 kg (205 lb 6.4 oz)   SpO2 98%   BMI 33.15 kg/m²     Physical Exam  Constitutional:       Appearance: Normal appearance.   HENT:      Head: Normocephalic and atraumatic.   Eyes:      Extraocular Movements: Extraocular movements intact.      Conjunctiva/sclera: Conjunctivae normal.      Pupils: Pupils are equal, round, and reactive to light.   Cardiovascular:      Rate and Rhythm: Normal rate and regular rhythm.      Pulses: Normal pulses.      Heart sounds: Normal heart sounds.   Pulmonary:      Effort: Pulmonary effort is normal. No respiratory distress.      Breath sounds: Normal breath sounds.   Musculoskeletal:      Cervical back: Normal range of motion and neck supple. No rigidity.      Comments: Anterior shoulder TTP, right hand swollen without erythema   Skin:     General: Skin is warm and dry.      Coloration: Skin is not pale.   Neurological:      General: No focal deficit present.      Mental Status: She is alert and oriented to person, place, and time. Mental status is at  baseline.      Gait: Gait normal.   Psychiatric:         Mood and Affect: Mood normal.         Behavior: Behavior normal.        Result Review :          Procedures      Assessment and Plan    Diagnoses and all orders for this visit:    1. Arm swelling (Primary)  Assessment & Plan:  Uncertain etiology.  Will check labs for inflammatory markers/autoimmune processes that could be causing joint inflammation.  We will go ahead and get vascular ultrasound of right upper extremity to rule out blood clot.  Will send to orthopedic surgeon for reevaluation of shoulder and possible chronic rotator cuff injury.    Orders:  -     Ambulatory Referral to Orthopedic Surgery  -     Uric acid  -     Antistreptolysin O screen  -     Rheumatoid factor  -     C-reactive protein  -     JOSE MANUEL  -     Duplex Venous Upper Extremity - Right CAR    Other orders  -     baclofen (LIORESAL) 20 MG tablet; Take 1 tablet by mouth 3 (Three) Times a Day.  Dispense: 90 tablet; Refill: 0            Follow Up   No follow-ups on file.  Patient was given instructions and counseling regarding her condition or for health maintenance advice. Please see specific information pulled into the AVS if appropriate.

## 2022-12-29 LAB — ASO AB SERPL-ACNC: NEGATIVE [IU]/ML

## 2022-12-31 LAB
DSDNA IGG SERPL IA-ACNC: NEGATIVE [IU]/ML
NUCLEAR IGG SER IA-RTO: NEGATIVE

## 2023-01-11 ENCOUNTER — OFFICE VISIT (OUTPATIENT)
Dept: ORTHOPEDIC SURGERY | Facility: CLINIC | Age: 48
End: 2023-01-11
Payer: MEDICARE

## 2023-01-11 VITALS — WEIGHT: 205 LBS | OXYGEN SATURATION: 97 % | HEIGHT: 66 IN | HEART RATE: 74 BPM | BODY MASS INDEX: 32.95 KG/M2

## 2023-01-11 DIAGNOSIS — M79.18 MYOFASCIAL MUSCLE PAIN: Primary | ICD-10-CM

## 2023-01-11 DIAGNOSIS — G56.01 CARPAL TUNNEL SYNDROME ON RIGHT: ICD-10-CM

## 2023-01-11 PROCEDURE — 20610 DRAIN/INJ JOINT/BURSA W/O US: CPT | Performed by: ORTHOPAEDIC SURGERY

## 2023-01-11 PROCEDURE — 99213 OFFICE O/P EST LOW 20 MIN: CPT | Performed by: ORTHOPAEDIC SURGERY

## 2023-01-11 RX ORDER — TRIAMCINOLONE ACETONIDE 40 MG/ML
40 INJECTION, SUSPENSION INTRA-ARTICULAR; INTRAMUSCULAR
Status: COMPLETED | OUTPATIENT
Start: 2023-01-11 | End: 2023-01-11

## 2023-01-11 RX ORDER — LIDOCAINE HYDROCHLORIDE 10 MG/ML
5 INJECTION, SOLUTION INFILTRATION; PERINEURAL
Status: COMPLETED | OUTPATIENT
Start: 2023-01-11 | End: 2023-01-11

## 2023-01-11 RX ADMIN — LIDOCAINE HYDROCHLORIDE 5 ML: 10 INJECTION, SOLUTION INFILTRATION; PERINEURAL at 08:28

## 2023-01-11 RX ADMIN — TRIAMCINOLONE ACETONIDE 40 MG: 40 INJECTION, SUSPENSION INTRA-ARTICULAR; INTRAMUSCULAR at 08:28

## 2023-01-11 NOTE — PROGRESS NOTES
"Chief Complaint  Follow-up of the Right Shoulder     Subjective      Mallory Mata presents to [facility] for follow up of the right shoulder. She reports the pain is in her neck and radiates to her shoulder joint around the AC joint. She notes increase pain all the time.  She is having numbness in her right thumb.  She works from home.  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     No Known Allergies     Social History     Socioeconomic History   • Marital status:    Tobacco Use   • Smoking status: Every Day     Packs/day: 0.50     Years: 10.00     Pack years: 5.00     Types: Cigarettes     Start date: 3/21/2006   • Smokeless tobacco: Never   Vaping Use   • Vaping Use: Never used   Substance and Sexual Activity   • Alcohol use: Not Currently   • Drug use: Not Currently     Comment: FORMER IN THE PAST    • Sexual activity: Yes     Partners: Male     Birth control/protection: None        Review of Systems     Objective   Vital Signs:   Pulse 74   Ht 167.6 cm (66\")   Wt 93 kg (205 lb)   SpO2 97%   BMI 33.09 kg/m²       Physical Exam  Constitutional:       Appearance: Normal appearance. Patient is well-developed and normal weight.   HENT:      Head: Normocephalic.      Right Ear: Hearing and external ear normal.      Left Ear: Hearing and external ear normal.      Nose: Nose normal.   Eyes:      Conjunctiva/sclera: Conjunctivae normal.   Cardiovascular:      Rate and Rhythm: Normal rate.   Pulmonary:      Effort: Pulmonary effort is normal.      Breath sounds: No wheezing or rales.   Abdominal:      Palpations: Abdomen is soft.      Tenderness: There is no abdominal tenderness.   Musculoskeletal:      Cervical back: Normal range of motion.   Skin:     Findings: No rash.   Neurological:      Mental Status: Patient  is alert and oriented to person, place, and time.   Psychiatric:         Mood and Affect: Mood and affect normal.         " Judgment: Judgment normal.       Ortho Exam      RIGHT SHOULDER radial pulse 2+. Ulnar pulse 2+. Good tone of deltoid, bicep, triceps, wrist extensors and flexors. Full ROM. Positive impingement testing.   Full ROM with pain with overhead movement.     RIGHT WRIST No thenar atrophy. Sensation intact. Neurovascular Intact. No swelling. No skin discoloration or muscle atrophy. Full , thumb opposition, MCP flexors, DIP flexors and PIP flexors. Full wrist flexion/extension.     Large Joint Arthrocentesis: Right Shoulder  Date/Time: 1/11/2023 8:28 AM  Consent given by: patient  Site marked: site marked  Timeout: Immediately prior to procedure a time out was called to verify the correct patient, procedure, equipment, support staff and site/side marked as required   Supporting Documentation  Indications: pain   Procedure Details  Location: shoulder (right shoulder ) -   Needle size: 22 G  Medications administered: 5 mL lidocaine 1 %; 40 mg triamcinolone acetonide 40 MG/ML  Patient tolerance: patient tolerated the procedure well with no immediate complications              Imaging Results (Most Recent)     None           Result Review :           Assessment and Plan     Diagnoses and all orders for this visit:    1. Myofascial muscle pain (Primary)  -     Large Joint Arthrocentesis: Right Shoulder    2. Carpal tunnel syndrome on right        Discussed the treatment plan with the patient. Discussed conservative measures as exercises, anti-inflammatory and injection. She uses a carpal tunnel brace at night. Discussed shoulder injection and discussed at some time can do a carpal tunnel injection, but would need someone to drive her here that day.  She tolerated right shoulder steroid injection. She tolerated injection well.     Educated on risk of smoking. Discussed options for smoking cessation. and Call or return if worsening symptoms.    Follow Up     PRN      Patient was given instructions and counseling regarding her  condition or for health maintenance advice. Please see specific information pulled into the AVS if appropriate.     Scribed for Sb Carrasquillo MD by Sandra Simeon MA.  01/11/23   07:53 EST    I have personally performed the services described in this document as scribed by the above individual and it is both accurate and complete. Sb Carrasquillo MD 01/11/23

## 2023-01-31 RX ORDER — NAPROXEN 500 MG/1
TABLET ORAL
Qty: 120 TABLET | Refills: 0 | Status: SHIPPED | OUTPATIENT
Start: 2023-01-31 | End: 2023-04-04

## 2023-02-27 ENCOUNTER — OFFICE VISIT (OUTPATIENT)
Dept: INTERNAL MEDICINE | Facility: CLINIC | Age: 48
End: 2023-02-27
Payer: MEDICARE

## 2023-02-27 VITALS
TEMPERATURE: 97.8 F | WEIGHT: 210.6 LBS | RESPIRATION RATE: 18 BRPM | BODY MASS INDEX: 33.85 KG/M2 | DIASTOLIC BLOOD PRESSURE: 80 MMHG | HEIGHT: 66 IN | SYSTOLIC BLOOD PRESSURE: 122 MMHG | OXYGEN SATURATION: 97 % | HEART RATE: 111 BPM

## 2023-02-27 DIAGNOSIS — I10 HYPERTENSION, UNSPECIFIED TYPE: ICD-10-CM

## 2023-02-27 DIAGNOSIS — J30.9 ALLERGIC RHINITIS, UNSPECIFIED SEASONALITY, UNSPECIFIED TRIGGER: ICD-10-CM

## 2023-02-27 DIAGNOSIS — F41.9 ANXIETY: ICD-10-CM

## 2023-02-27 DIAGNOSIS — F33.42 MAJOR DEPRESSIVE DISORDER, RECURRENT EPISODE, IN FULL REMISSION: Primary | ICD-10-CM

## 2023-02-27 DIAGNOSIS — F17.211 CIGARETTE NICOTINE DEPENDENCE IN REMISSION: ICD-10-CM

## 2023-02-27 DIAGNOSIS — M62.838 MUSCLE SPASTICITY: ICD-10-CM

## 2023-02-27 DIAGNOSIS — R79.89 OTHER SPECIFIED ABNORMAL FINDINGS OF BLOOD CHEMISTRY: ICD-10-CM

## 2023-02-27 DIAGNOSIS — Z13.220 SCREENING FOR HYPERLIPIDEMIA: ICD-10-CM

## 2023-02-27 DIAGNOSIS — E55.9 VITAMIN D DEFICIENCY, UNSPECIFIED: ICD-10-CM

## 2023-02-27 DIAGNOSIS — Z12.4 SCREENING FOR CERVICAL CANCER: ICD-10-CM

## 2023-02-27 DIAGNOSIS — Z11.59 NEED FOR HEPATITIS C SCREENING TEST: ICD-10-CM

## 2023-02-27 DIAGNOSIS — Z23 IMMUNIZATION DUE: ICD-10-CM

## 2023-02-27 PROBLEM — J06.9 ACUTE URI: Status: RESOLVED | Noted: 2022-03-08 | Resolved: 2023-02-27

## 2023-02-27 LAB
25(OH)D3 SERPL-MCNC: 30.5 NG/ML (ref 30–100)
ALBUMIN SERPL-MCNC: 4.5 G/DL (ref 3.5–5.2)
ALBUMIN/GLOB SERPL: 1.7 G/DL
ALP SERPL-CCNC: 88 U/L (ref 39–117)
ALT SERPL W P-5'-P-CCNC: 16 U/L (ref 1–33)
ANION GAP SERPL CALCULATED.3IONS-SCNC: 9.6 MMOL/L (ref 5–15)
AST SERPL-CCNC: 20 U/L (ref 1–32)
BASOPHILS # BLD AUTO: 0.07 10*3/MM3 (ref 0–0.2)
BASOPHILS NFR BLD AUTO: 1.2 % (ref 0–1.5)
BILIRUB SERPL-MCNC: <0.2 MG/DL (ref 0–1.2)
BUN SERPL-MCNC: 9 MG/DL (ref 6–20)
BUN/CREAT SERPL: 10.3 (ref 7–25)
CALCIUM SPEC-SCNC: 9.4 MG/DL (ref 8.6–10.5)
CHLORIDE SERPL-SCNC: 108 MMOL/L (ref 98–107)
CHOLEST SERPL-MCNC: 212 MG/DL (ref 0–200)
CO2 SERPL-SCNC: 21.4 MMOL/L (ref 22–29)
CREAT SERPL-MCNC: 0.87 MG/DL (ref 0.57–1)
DEPRECATED RDW RBC AUTO: 44.5 FL (ref 37–54)
EGFRCR SERPLBLD CKD-EPI 2021: 83.9 ML/MIN/1.73
EOSINOPHIL # BLD AUTO: 0.16 10*3/MM3 (ref 0–0.4)
EOSINOPHIL NFR BLD AUTO: 2.8 % (ref 0.3–6.2)
ERYTHROCYTE [DISTWIDTH] IN BLOOD BY AUTOMATED COUNT: 14.7 % (ref 12.3–15.4)
FOLATE SERPL-MCNC: >20 NG/ML (ref 4.78–24.2)
GLOBULIN UR ELPH-MCNC: 2.6 GM/DL
GLUCOSE SERPL-MCNC: 77 MG/DL (ref 65–99)
HBA1C MFR BLD: 5.5 % (ref 4.8–5.6)
HCT VFR BLD AUTO: 40.7 % (ref 34–46.6)
HCV AB SER DONR QL: NORMAL
HDLC SERPL-MCNC: 39 MG/DL (ref 40–60)
HGB BLD-MCNC: 13.6 G/DL (ref 12–15.9)
IMM GRANULOCYTES # BLD AUTO: 0.01 10*3/MM3 (ref 0–0.05)
IMM GRANULOCYTES NFR BLD AUTO: 0.2 % (ref 0–0.5)
LDLC SERPL CALC-MCNC: 138 MG/DL (ref 0–100)
LDLC/HDLC SERPL: 3.45 {RATIO}
LYMPHOCYTES # BLD AUTO: 2.17 10*3/MM3 (ref 0.7–3.1)
LYMPHOCYTES NFR BLD AUTO: 37.8 % (ref 19.6–45.3)
MCH RBC QN AUTO: 28.1 PG (ref 26.6–33)
MCHC RBC AUTO-ENTMCNC: 33.4 G/DL (ref 31.5–35.7)
MCV RBC AUTO: 84.1 FL (ref 79–97)
MONOCYTES # BLD AUTO: 0.44 10*3/MM3 (ref 0.1–0.9)
MONOCYTES NFR BLD AUTO: 7.7 % (ref 5–12)
NEUTROPHILS NFR BLD AUTO: 2.89 10*3/MM3 (ref 1.7–7)
NEUTROPHILS NFR BLD AUTO: 50.3 % (ref 42.7–76)
NRBC BLD AUTO-RTO: 0 /100 WBC (ref 0–0.2)
PLATELET # BLD AUTO: 305 10*3/MM3 (ref 140–450)
PMV BLD AUTO: 10.9 FL (ref 6–12)
POTASSIUM SERPL-SCNC: 4.2 MMOL/L (ref 3.5–5.2)
PROT SERPL-MCNC: 7.1 G/DL (ref 6–8.5)
RBC # BLD AUTO: 4.84 10*6/MM3 (ref 3.77–5.28)
SODIUM SERPL-SCNC: 139 MMOL/L (ref 136–145)
TRIGL SERPL-MCNC: 193 MG/DL (ref 0–150)
TSH SERPL DL<=0.05 MIU/L-ACNC: 1.58 UIU/ML (ref 0.27–4.2)
VIT B12 BLD-MCNC: 1214 PG/ML (ref 211–946)
VLDLC SERPL-MCNC: 35 MG/DL (ref 5–40)
WBC NRBC COR # BLD: 5.74 10*3/MM3 (ref 3.4–10.8)

## 2023-02-27 PROCEDURE — 80061 LIPID PANEL: CPT

## 2023-02-27 PROCEDURE — 1170F FXNL STATUS ASSESSED: CPT | Performed by: INTERNAL MEDICINE

## 2023-02-27 PROCEDURE — 82746 ASSAY OF FOLIC ACID SERUM: CPT

## 2023-02-27 PROCEDURE — 1160F RVW MEDS BY RX/DR IN RCRD: CPT | Performed by: INTERNAL MEDICINE

## 2023-02-27 PROCEDURE — 86803 HEPATITIS C AB TEST: CPT

## 2023-02-27 PROCEDURE — 80053 COMPREHEN METABOLIC PANEL: CPT

## 2023-02-27 PROCEDURE — 85025 COMPLETE CBC W/AUTO DIFF WBC: CPT

## 2023-02-27 PROCEDURE — 99214 OFFICE O/P EST MOD 30 MIN: CPT | Performed by: INTERNAL MEDICINE

## 2023-02-27 PROCEDURE — 83036 HEMOGLOBIN GLYCOSYLATED A1C: CPT

## 2023-02-27 PROCEDURE — 84443 ASSAY THYROID STIM HORMONE: CPT

## 2023-02-27 PROCEDURE — G0439 PPPS, SUBSEQ VISIT: HCPCS | Performed by: INTERNAL MEDICINE

## 2023-02-27 PROCEDURE — 82306 VITAMIN D 25 HYDROXY: CPT

## 2023-02-27 PROCEDURE — 82607 VITAMIN B-12: CPT

## 2023-02-27 RX ORDER — TOPIRAMATE 25 MG/1
75 TABLET ORAL 2 TIMES DAILY
Qty: 180 TABLET | Refills: 1 | Status: SHIPPED | OUTPATIENT
Start: 2023-02-27

## 2023-02-27 RX ORDER — BREXPIPRAZOLE 3 MG/1
1 TABLET ORAL DAILY
COMMUNITY
Start: 2023-01-31

## 2023-02-27 RX ORDER — MONTELUKAST SODIUM 10 MG/1
10 TABLET ORAL DAILY
Qty: 90 TABLET | Refills: 1 | Status: SHIPPED | OUTPATIENT
Start: 2023-02-27

## 2023-02-27 RX ORDER — FEXOFENADINE HYDROCHLORIDE 60 MG/1
60 TABLET, FILM COATED ORAL DAILY
Qty: 30 TABLET | Refills: 2 | Status: SHIPPED | OUTPATIENT
Start: 2023-02-27 | End: 2023-05-28

## 2023-02-27 RX ORDER — FLUTICASONE PROPIONATE 50 MCG
2 SPRAY, SUSPENSION (ML) NASAL DAILY
Qty: 16 G | Refills: 3 | Status: SHIPPED | OUTPATIENT
Start: 2023-02-27

## 2023-02-27 RX ORDER — VARENICLINE TARTRATE 1 MG/1
1 TABLET, FILM COATED ORAL 2 TIMES DAILY
Qty: 60 TABLET | Refills: 1 | Status: SHIPPED | OUTPATIENT
Start: 2023-02-27 | End: 2023-03-29

## 2023-02-27 RX ORDER — TRAZODONE HYDROCHLORIDE 100 MG/1
100 TABLET ORAL
COMMUNITY
Start: 2023-01-31

## 2023-02-27 NOTE — PROGRESS NOTES
Chief Complaint  Follow-up (3 month ), Nicotine Dependence (Wants to get something to stop smoking), Arm Swelling (Follow up ),  Contracture of muscle of multiple sites (Follow up ), Insomnia (Follow up ), and Depression (Follow up )    Subjective       Mallory Mata presents to [facility]    HPI     Nicotine Dependence- pt is interested in smoking cessation. Pt has tried Chantix in the pass, and worked well for her. Last time pt used it was a few months ago. Pt smokes one pack a day.     Muscle spasm- Pt has Botox injection every 3 months, las on on 02/14. On baclofen 20 mg TID. Doing well. No extreme fatigue from it. On naproxen 500 mg BID daily. Tylenol as needed for pain.     Mental health- on trintellix q day, rexulit, Buspar TID.  Last year experiencing increase depression, pt's  and son went to assisted at the same time. Currently  from . Son back at home. Pt isolated herself due to depression, has increase in weight. Patient notes she spends a lot of time alone at home. Pt work's from home. Mom and nephew in town. Pt goes to therapy, one to two times a week. Pt believes Trintellix helping a lot. Denies SI/HI. Pt see's psychiatrist     Weight loss- on Topamax 50 mg BID. Has not noticed decrease in appetite, it did at first but not now. Pt trying to limit what patient eats, pt is pescatarian. Pt currently does not have her car, has limited physical activity at gym.     Insomnia- taking trazodone 100 mg.  Pt wakes up once or twice during the night, sometimes can take up to an hour to go back to sleep.     Allergies- on Flonase and singular. However, pt has noticed increase lacrimation daily. Denies eye pain.     Screening - will like to get Tdap, pt believes she had Pap smear done here in office with in the last 3 years.    Dental- last time pt saw dentist was a few years ago, trying to get into one.   Up to date with eye exam   Pt checks BP  "periodically, and it is WNR.   Denies chest pain, dyspnea, blood in stool, abdominal pain, headaches.   Objective     Vitals:    02/27/23 0809   BP: 122/80   Pulse: 111   Resp: 18   Temp: 97.8 °F (36.6 °C)   SpO2: 97%   Weight: 95.5 kg (210 lb 9.6 oz)   Height: 167.6 cm (66\")      Wt Readings from Last 3 Encounters:   02/27/23 95.5 kg (210 lb 9.6 oz)   01/11/23 93 kg (205 lb)   12/28/22 93.2 kg (205 lb 6.4 oz)      BP Readings from Last 3 Encounters:   02/27/23 122/80   12/28/22 140/90   11/02/22 138/60        Body mass index is 33.99 kg/m².       Physical Exam  Constitutional:       Appearance: Normal appearance.   HENT:      Head: Normocephalic and atraumatic.      Right Ear: Tympanic membrane, ear canal and external ear normal.      Left Ear: Tympanic membrane, ear canal and external ear normal.      Ears:      Comments: Non purulent fluid TM bilaterally      Nose: Nose normal.      Mouth/Throat:      Mouth: Mucous membranes are moist.      Pharynx: Oropharynx is clear.   Eyes:      Conjunctiva/sclera: Conjunctivae normal.   Cardiovascular:      Rate and Rhythm: Normal rate and regular rhythm.      Pulses: Normal pulses.      Heart sounds: Normal heart sounds.   Pulmonary:      Effort: Pulmonary effort is normal.      Breath sounds: Normal breath sounds.   Lymphadenopathy:      Cervical: No cervical adenopathy.   Skin:     General: Skin is warm and dry.   Neurological:      Mental Status: She is alert and oriented to person, place, and time. Mental status is at baseline.   Psychiatric:         Mood and Affect: Mood normal.         Behavior: Behavior normal.         Thought Content: Thought content normal.         Judgment: Judgment normal.          Procedures    Assessment and Plan   Diagnoses and all orders for this visit:    1. Major depressive disorder, recurrent episode, in full remission (HCC) (Primary)  Assessment & Plan:  -Doing well, continue trintilex, Buspar and Rexulit.   -Continue counseling, " encouraged pt to try  Getting out the house more       Orders:  -     TSH    2. Anxiety  Assessment & Plan:  -Continue current management     Orders:  -     TSH    3. Allergic rhinitis, unspecified seasonality, unspecified trigger  Assessment & Plan:  -on flonase and singular   -will add allegra      4. Muscle spasticity  Assessment & Plan:  -Continue Baclofen and Botox injections       5. Need for hepatitis C screening test  -     Hepatitis C Antibody    6. Immunization due  Assessment & Plan:  -Advised pt to get Tdap shot at pharmacy       7. Vitamin D deficiency, unspecified   Assessment & Plan:  -will check blood work     Orders:  -     Vitamin D,25-Hydroxy    8. Hypertension, unspecified type  Assessment & Plan:  -Stable with diet and exercise     Orders:  -     Comprehensive Metabolic Panel  -     CBC & Differential  -     TSH  -     Lipid Panel  -     Vitamin B12 & Folate    9. Screening for hyperlipidemia  -     Lipid Panel    10. BMI 33.0-33.9,adult  Assessment & Plan:  -Will check HA1C  -Will increase Topamax to 75 mg BID. Discussed potential side effects     Orders:  -     Hemoglobin A1c    11. Screening for cervical cancer  -     Ambulatory Referral to Obstetrics / Gynecology    12. Other specified abnormal findings of blood chemistry  -     Hemoglobin A1c    13. Cigarette nicotine dependence in remission  Assessment & Plan:  Will start Chantix. Pt has tolerated well in the  Pass.   -Discussed potential side effects       Other orders  -     fluticasone (FLONASE) 50 MCG/ACT nasal spray; 2 sprays by Each Nare route Daily.  Dispense: 16 g; Refill: 3  -     montelukast (SINGULAIR) 10 MG tablet; Take 1 tablet by mouth Daily.  Dispense: 90 tablet; Refill: 1  -     topiramate (TOPAMAX) 25 MG tablet; Take 3 tablets by mouth 2 (Two) Times a Day.  Dispense: 180 tablet; Refill: 1  -     fexofenadine (Allegra Allergy) 60 MG tablet; Take 1 tablet by mouth Daily for 90 days.  Dispense: 30 tablet; Refill: 2  -      varenicline (Chantix Continuing Month Immanuel) 1 MG tablet; Take 1 tablet by mouth 2 (Two) Times a Day for 30 days.  Dispense: 60 tablet; Refill: 1        Follow Up   Return in about 3 months (around 5/27/2023) for Recheck.  Patient was given instructions and counseling regarding her condition or for health maintenance advice. Please see specific information pulled into the AVS if appropriate.

## 2023-02-27 NOTE — PROGRESS NOTES
The ABCs of the Annual Wellness Visit  Subsequent Medicare Wellness Visit    Subjective      Mallory Mata is a 47 y.o. female who presents for a Subsequent Medicare Wellness Visit.    The following portions of the patient's history were reviewed and   updated as appropriate: allergies, current medications, past family history, past medical history, past social history, past surgical history and problem list.    Compared to one year ago, the patient feels her physical   health is the same.    Compared to one year ago, the patient feels her mental   health is the same.    Recent Hospitalizations:  She was not admitted to the hospital during the last year.       Current Medical Providers:  Patient Care Team:  Tiffany Salomon MD as PCP - General (Internal Medicine)  Mayra Osorio APRN as Nurse Practitioner (Obstetrics and Gynecology)    Outpatient Medications Prior to Visit   Medication Sig Dispense Refill   • acetaminophen (TYLENOL) 500 MG tablet Take 1 tablet by mouth Every 6 (Six) Hours As Needed for Mild Pain. 30 tablet 0   • B Complex Vitamins (VITAMIN B COMPLEX PO) Take  by mouth Daily.     • baclofen (LIORESAL) 20 MG tablet Take 1 tablet by mouth 3 (Three) Times a Day. 90 tablet 0   • busPIRone (BUSPAR) 15 MG tablet Take 15 mg by mouth 3 (Three) Times a Day.     • Multiple Vitamins-Minerals (HAIR SKIN AND NAILS FORMULA) tablet Take  by mouth Daily.     • naproxen (NAPROSYN) 500 MG tablet TAKE 1 TABLET BY MOUTH TWICE DAILY WITH MEALS 120 tablet 0   • Trintellix 20 MG tablet      • fluticasone (FLONASE) 50 MCG/ACT nasal spray 2 sprays by Each Nare route Daily. 16 g 3   • montelukast (SINGULAIR) 10 MG tablet Take 1 tablet by mouth Daily. 90 tablet 1   • Rexulti 2 MG tablet Take 1 tablet by mouth Daily.     • topiramate (TOPAMAX) 25 MG tablet Take 3 tablets by mouth 2 (Two) Times a Day. 180 tablet 1   • traZODone (DESYREL) 50 MG tablet Take 1 tablet by mouth Every Night. 90 tablet 1   • Rexulti 3 MG  "tablet Take 1 tablet by mouth Daily.     • traZODone (DESYREL) 100 MG tablet Take 100 mg by mouth every night at bedtime.     • Benzocaine-Menthol (Cepacol) 15-2.3 MG lozenge Dissolve 1 lozenge in the mouth 4 (Four) Times a Day. 16 lozenge 0     No facility-administered medications prior to visit.       No opioid medication identified on active medication list. I have reviewed chart for other potential  high risk medication/s and harmful drug interactions in the elderly.          Aspirin is not on active medication list.  Aspirin use is not indicated based on review of current medical condition/s. Risk of harm outweighs potential benefits.  .    Patient Active Problem List   Diagnosis   • Dystonia   • Muscle spasticity   • Neck pain   • Weakness, right upper extremity   • Myofascial muscle pain   • Hip pain   • Allergic rhinitis   • Anxiety   • Contracture of muscle of multiple sites   • Enthesopathy of hip region   • Heart murmur   • Hypertension   • Major depressive disorder, recurrent episode, in full remission (HCC)   • Migraine   • Bilateral carpal tunnel syndrome   • Chronic right shoulder pain   • Laryngitis   • Abnormal uterine bleeding   • Trauma   • Fatigue   • Vitamin D deficiency, unspecified    • Arm swelling   • Immunization due   • BMI 33.0-33.9,adult   • Cigarette nicotine dependence in remission     Advance Care Planning  Advance Directive is not on file.  ACP discussion was held with the patient during this visit. Patient does not have an advance directive, declines further assistance.     Objective    Vitals:    02/27/23 0809   BP: 122/80   Pulse: 111   Resp: 18   Temp: 97.8 °F (36.6 °C)   SpO2: 97%   Weight: 95.5 kg (210 lb 9.6 oz)   Height: 167.6 cm (66\")     Estimated body mass index is 33.99 kg/m² as calculated from the following:    Height as of this encounter: 167.6 cm (66\").    Weight as of this encounter: 95.5 kg (210 lb 9.6 oz).    BMI is >= 30 and <35. (Class 1 Obesity). The following " options were offered after discussion;: exercise counseling/recommendations and nutrition counseling/recommendations      Does the patient have evidence of cognitive impairment?   No            HEALTH RISK ASSESSMENT    Smoking Status:  Social History     Tobacco Use   Smoking Status Every Day   • Packs/day: 0.50   • Years: 10.00   • Pack years: 5.00   • Types: Cigarettes   • Start date: 3/21/2006   Smokeless Tobacco Never     Alcohol Consumption:  Social History     Substance and Sexual Activity   Alcohol Use Not Currently     Fall Risk Screen:    STEADI Fall Risk Assessment was completed, and patient is at LOW risk for falls.Assessment completed on:2/27/2023    Depression Screening:  PHQ-2/PHQ-9 Depression Screening 2/27/2023   Little Interest or Pleasure in Doing Things -   Feeling Down, Depressed or Hopeless -   Trouble Falling or Staying Asleep, or Sleeping Too Much 1-->several days   Feeling Tired or Having Little Energy 1-->several days   Poor Appetite or Overeating 0-->not at all   Feeling Bad about Yourself - or that You are a Failure or Have Let Yourself or Your Family Down 0-->not at all   Trouble Concentrating on Things, Such as Reading the Newspaper or Watching Television 0-->not at all   Moving or Speaking So Slowly that Other People Could Have Noticed? Or the Opposite - Being So Fidgety 0-->not at all   Thoughts that You Would be Better Off Dead or of Hurting Yourself in Some Way 0-->not at all   PHQ-9: Brief Depression Severity Measure Score 2       Health Habits and Functional and Cognitive Screening:  Functional & Cognitive Status 2/27/2023   Do you have difficulty preparing food and eating? No   Do you have difficulty bathing yourself, getting dressed or grooming yourself? No   Do you have difficulty using the toilet? No   Do you have difficulty moving around from place to place? No   Do you have trouble with steps or getting out of a bed or a chair? No   Current Diet Limited Junk Food   Dental  Exam Not up to date   Eye Exam Up to date   Exercise (times per week) 5 times per week   Current Exercises Include Aerobics   Do you need help using the phone?  No   Are you deaf or do you have serious difficulty hearing?  No   Do you need help with transportation? No   Do you need help shopping? No   Do you need help preparing meals?  No   Do you need help with housework?  No   Do you need help with laundry? No   Do you need help taking your medications? No   Do you need help managing money? No   Do you ever drive or ride in a car without wearing a seat belt? No   Have you felt unusual stress, anger or loneliness in the last month? No   Who do you live with? Child   If you need help, do you have trouble finding someone available to you? No   Have you been bothered in the last four weeks by sexual problems? No   Do you have difficulty concentrating, remembering or making decisions? No       Age-appropriate Screening Schedule:  Refer to the list below for future screening recommendations based on patient's age, sex and/or medical conditions. Orders for these recommended tests are listed in the plan section. The patient has been provided with a written plan.    Health Maintenance   Topic Date Due   • COLORECTAL CANCER SCREENING  Never done   • COVID-19 Vaccine (1) Never done   • HEPATITIS C SCREENING  Never done   • PAP SMEAR  05/28/2022   • TDAP/TD VACCINES (1 - Tdap) 02/27/2023 (Originally 5/24/1996)   • ANNUAL WELLNESS VISIT  02/27/2024   • Pneumococcal Vaccine 0-64 (3 - PPSV23 if available, else PCV20) 05/24/2042   • INFLUENZA VACCINE  Completed                CMS Preventative Services Quick Reference  Risk Factors Identified During Encounter:    Immunizations Discussed/Encouraged: Up-to-date right    The above risks/problems have been discussed with the patient.  Pertinent information has been shared with the patient in the After Visit Summary.    Diagnoses and all orders for this visit:    1. Major depressive  disorder, recurrent episode, in full remission (HCC) (Primary)  Assessment & Plan:  -Doing well, continue trintilex, Buspar and Rexulit.   -Continue counseling, encouraged pt to try  Getting out the house more       Orders:  -     TSH    2. Anxiety  Assessment & Plan:  -Continue current management     Orders:  -     TSH    3. Allergic rhinitis, unspecified seasonality, unspecified trigger  Assessment & Plan:  -on flonase and singular   -will add allegra      4. Muscle spasticity  Assessment & Plan:  -Continue Baclofen and Botox injections       5. Need for hepatitis C screening test  -     Hepatitis C Antibody    6. Immunization due  Assessment & Plan:  -Advised pt to get Tdap shot at pharmacy       7. Vitamin D deficiency, unspecified   Assessment & Plan:  -will check blood work     Orders:  -     Vitamin D,25-Hydroxy    8. Hypertension, unspecified type  Assessment & Plan:  -Stable with diet and exercise     Orders:  -     Comprehensive Metabolic Panel  -     CBC & Differential  -     TSH  -     Lipid Panel  -     Vitamin B12 & Folate    9. Screening for hyperlipidemia  -     Lipid Panel    10. BMI 33.0-33.9,adult  Assessment & Plan:  -Will check HA1C  -Will increase Topamax to 75 mg BID. Discussed potential side effects     Orders:  -     Hemoglobin A1c    11. Screening for cervical cancer  -     Ambulatory Referral to Obstetrics / Gynecology    12. Other specified abnormal findings of blood chemistry  -     Hemoglobin A1c    13. Cigarette nicotine dependence in remission  Assessment & Plan:  Will start Chantix. Pt has tolerated well in the  Pass.   -Discussed potential side effects       Other orders  -     fluticasone (FLONASE) 50 MCG/ACT nasal spray; 2 sprays by Each Nare route Daily.  Dispense: 16 g; Refill: 3  -     montelukast (SINGULAIR) 10 MG tablet; Take 1 tablet by mouth Daily.  Dispense: 90 tablet; Refill: 1  -     topiramate (TOPAMAX) 25 MG tablet; Take 3 tablets by mouth 2 (Two) Times a Day.   Dispense: 180 tablet; Refill: 1  -     fexofenadine (Allegra Allergy) 60 MG tablet; Take 1 tablet by mouth Daily for 90 days.  Dispense: 30 tablet; Refill: 2  -     varenicline (Chantix Continuing Month Immanuel) 1 MG tablet; Take 1 tablet by mouth 2 (Two) Times a Day for 30 days.  Dispense: 60 tablet; Refill: 1      Follow Up:   Next Medicare Wellness visit to be scheduled in 1 year.      An After Visit Summary and PPPS were made available to the patient.

## 2023-02-27 NOTE — ASSESSMENT & PLAN NOTE
-Doing well, continue trintilex, Buspar and Rexulit.   -Continue counseling, encouraged pt to try  Getting out the house more

## 2023-03-10 RX ORDER — BACLOFEN 20 MG/1
TABLET ORAL
Qty: 90 TABLET | Refills: 0 | Status: SHIPPED | OUTPATIENT
Start: 2023-03-10

## 2023-03-27 ENCOUNTER — TELEPHONE (OUTPATIENT)
Dept: INTERNAL MEDICINE | Facility: CLINIC | Age: 48
End: 2023-03-27

## 2023-04-04 RX ORDER — NAPROXEN 500 MG/1
TABLET ORAL
Qty: 120 TABLET | Refills: 0 | Status: SHIPPED | OUTPATIENT
Start: 2023-04-04

## 2023-04-10 RX ORDER — BACLOFEN 20 MG/1
TABLET ORAL
Qty: 90 TABLET | Refills: 0 | Status: SHIPPED | OUTPATIENT
Start: 2023-04-10

## 2023-04-27 RX ORDER — VARENICLINE TARTRATE 1 MG/1
1 TABLET, FILM COATED ORAL 2 TIMES DAILY
Qty: 60 TABLET | Refills: 0 | Status: SHIPPED | OUTPATIENT
Start: 2023-04-27

## 2023-04-27 RX ORDER — VARENICLINE TARTRATE 1 MG/1
1 TABLET, FILM COATED ORAL 2 TIMES DAILY
COMMUNITY
End: 2023-04-27 | Stop reason: SDUPTHER

## 2023-05-01 ENCOUNTER — OFFICE VISIT (OUTPATIENT)
Dept: ORTHOPEDIC SURGERY | Facility: CLINIC | Age: 48
End: 2023-05-01
Payer: MEDICARE

## 2023-05-01 VITALS — HEIGHT: 66 IN | BODY MASS INDEX: 32.62 KG/M2 | OXYGEN SATURATION: 96 % | HEART RATE: 94 BPM | WEIGHT: 203 LBS

## 2023-05-01 DIAGNOSIS — M75.51 ACUTE BURSITIS OF RIGHT SHOULDER: ICD-10-CM

## 2023-05-01 DIAGNOSIS — M79.18 MYOFASCIAL MUSCLE PAIN: Primary | ICD-10-CM

## 2023-05-01 RX ADMIN — METHYLPREDNISOLONE ACETATE 80 MG: 80 INJECTION, SUSPENSION INTRA-ARTICULAR; INTRALESIONAL; INTRAMUSCULAR; SOFT TISSUE at 08:57

## 2023-05-01 RX ADMIN — LIDOCAINE HYDROCHLORIDE 5 ML: 10 INJECTION, SOLUTION EPIDURAL; INFILTRATION; INTRACAUDAL; PERINEURAL at 08:57

## 2023-05-01 NOTE — PROGRESS NOTES
"Chief Complaint  Follow-up of the Right Shoulder     Subjective      Mallory Mata presents to [facility] for follow up of the right shoulder.  She reports the pain is in her neck and radiates to her shoulder joint around the AC joint. She notes increase pain all the time.  She is having numbness in her right thumb.  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  She had a shoulder injection on 1/11/23. 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     No Known Allergies     Social History     Socioeconomic History   • Marital status:    Tobacco Use   • Smoking status: Every Day     Packs/day: 0.50     Years: 10.00     Pack years: 5.00     Types: Cigarettes     Start date: 3/21/2006   • Smokeless tobacco: Never   Vaping Use   • Vaping Use: Never used   Substance and Sexual Activity   • Alcohol use: Not Currently   • Drug use: Not Currently     Comment: FORMER IN THE PAST    • Sexual activity: Yes     Partners: Male     Birth control/protection: None        Review of Systems     Objective   Vital Signs:   Pulse 94   Ht 167.6 cm (66\")   Wt 92.1 kg (203 lb)   SpO2 96%   BMI 32.77 kg/m²       Physical Exam  Constitutional:       Appearance: Normal appearance. Patient is well-developed and normal weight.   HENT:      Head: Normocephalic.      Right Ear: Hearing and external ear normal.      Left Ear: Hearing and external ear normal.      Nose: Nose normal.   Eyes:      Conjunctiva/sclera: Conjunctivae normal.   Cardiovascular:      Rate and Rhythm: Normal rate.   Pulmonary:      Effort: Pulmonary effort is normal.      Breath sounds: No wheezing or rales.   Abdominal:      Palpations: Abdomen is soft.      Tenderness: There is no abdominal tenderness.   Musculoskeletal:      Cervical back: Normal range of motion.   Skin:     Findings: No rash.   Neurological:      Mental Status: Patient  is alert and " oriented to person, place, and time.   Psychiatric:         Mood and Affect: Mood and affect normal.         Judgment: Judgment normal.       Ortho Exam      RIGHT SHOULDER Forward flexion 170. Abduction 100. External rotation 60. Internal rotation L5. Positive Cross body adduction. Supraspinatus strength 4/5. Infraspinatus Strength 4/5. Infrared subscap 4/5. Positive Price. Positive Neer. Negative Apprehension. Negative Lift off. (Negative Obriens. Sensation intact to light touch, median, radial, ulnar nerve. Positive AIN, PIN, ulnar nerve motor. Positive pulses. Positive Impingement signs. Good strength in triceps, biceps, deltoid, wrist extensors and wrist flexors.        Right shoulder  Date/Time: 5/1/2023 8:57 AM  Consent given by: patient  Site marked: site marked  Timeout: Immediately prior to procedure a time out was called to verify the correct patient, procedure, equipment, support staff and site/side marked as required   Supporting Documentation  Indications: pain   Procedure Details  Location: shoulder (Right shoulder) -   Needle size: 22 G  Medications administered: 80 mg methylPREDNISolone acetate 80 MG/ML; 5 mL lidocaine PF 1% 1 %  Patient tolerance: patient tolerated the procedure well with no immediate complications              Imaging Results (Most Recent)     None           Result Review :           Assessment and Plan     Diagnoses and all orders for this visit:    1. Myofascial muscle pain (Primary)    2. Acute bursitis of right shoulder        Discussed the treatment plan with the patient. Discussed the risks and benefits of conservative measures.  The patient expressed understanding and wished to proceed with a right shoulder steroid injection.  She tolerated the injection well.     Prescribed physical therapy.     Note for work.    Educated on risk of smoking. Discussed options for smoking cessation. and Call or return if worsening symptoms.    Follow Up     PRN    Patient was given  instructions and counseling regarding her condition or for health maintenance advice. Please see specific information pulled into the AVS if appropriate.     Scribed for Sb Carrasquillo MD by Sandra Simeon MA.  05/01/23   08:36 EDT    I have personally performed the services described in this document as scribed by the above individual and it is both accurate and complete. Sb Carrasuqillo MD 05/02/23

## 2023-05-02 RX ORDER — METHYLPREDNISOLONE ACETATE 80 MG/ML
80 INJECTION, SUSPENSION INTRA-ARTICULAR; INTRALESIONAL; INTRAMUSCULAR; SOFT TISSUE
Status: COMPLETED | OUTPATIENT
Start: 2023-05-01 | End: 2023-05-01

## 2023-05-02 RX ORDER — LIDOCAINE HYDROCHLORIDE 10 MG/ML
5 INJECTION, SOLUTION EPIDURAL; INFILTRATION; INTRACAUDAL; PERINEURAL
Status: COMPLETED | OUTPATIENT
Start: 2023-05-01 | End: 2023-05-01

## 2023-05-11 RX ORDER — BACLOFEN 20 MG/1
TABLET ORAL
Qty: 90 TABLET | Refills: 0 | Status: SHIPPED | OUTPATIENT
Start: 2023-05-11

## 2023-06-05 ENCOUNTER — OFFICE VISIT (OUTPATIENT)
Dept: INTERNAL MEDICINE | Facility: CLINIC | Age: 48
End: 2023-06-05
Payer: MEDICARE

## 2023-06-05 VITALS
RESPIRATION RATE: 18 BRPM | OXYGEN SATURATION: 98 % | HEART RATE: 103 BPM | DIASTOLIC BLOOD PRESSURE: 81 MMHG | SYSTOLIC BLOOD PRESSURE: 119 MMHG | WEIGHT: 208.4 LBS | BODY MASS INDEX: 33.49 KG/M2 | TEMPERATURE: 98.3 F | HEIGHT: 66 IN

## 2023-06-05 DIAGNOSIS — I10 HYPERTENSION, UNSPECIFIED TYPE: ICD-10-CM

## 2023-06-05 DIAGNOSIS — F17.219 CIGARETTE NICOTINE DEPENDENCE WITH NICOTINE-INDUCED DISORDER: ICD-10-CM

## 2023-06-05 DIAGNOSIS — F33.42 MAJOR DEPRESSIVE DISORDER, RECURRENT EPISODE, IN FULL REMISSION: Primary | ICD-10-CM

## 2023-06-05 DIAGNOSIS — J30.9 ALLERGIC RHINITIS, UNSPECIFIED SEASONALITY, UNSPECIFIED TRIGGER: ICD-10-CM

## 2023-06-05 DIAGNOSIS — Z23 IMMUNIZATION DUE: ICD-10-CM

## 2023-06-05 DIAGNOSIS — E55.9 VITAMIN D DEFICIENCY, UNSPECIFIED: ICD-10-CM

## 2023-06-05 DIAGNOSIS — T30.0 BURN: ICD-10-CM

## 2023-06-05 DIAGNOSIS — M62.838 MUSCLE SPASTICITY: ICD-10-CM

## 2023-06-05 DIAGNOSIS — Z12.11 SCREEN FOR COLON CANCER: ICD-10-CM

## 2023-06-05 DIAGNOSIS — F41.9 ANXIETY: ICD-10-CM

## 2023-06-05 RX ORDER — NAPROXEN 500 MG/1
500 TABLET ORAL 2 TIMES DAILY WITH MEALS
Qty: 120 TABLET | Refills: 0 | Status: SHIPPED | OUTPATIENT
Start: 2023-06-05

## 2023-06-05 RX ORDER — FLUTICASONE PROPIONATE 50 MCG
2 SPRAY, SUSPENSION (ML) NASAL DAILY
Qty: 16 G | Refills: 3 | Status: SHIPPED | OUTPATIENT
Start: 2023-06-05

## 2023-06-05 RX ORDER — MIRTAZAPINE 15 MG/1
15 TABLET, FILM COATED ORAL
COMMUNITY
Start: 2023-05-24

## 2023-06-05 NOTE — ASSESSMENT & PLAN NOTE
-Currently worsening depression, working closely with [facility] and therapist. Denies SI

## 2023-06-05 NOTE — ASSESSMENT & PLAN NOTE
Discussed with patient to use water and soap to clean area twice a day.  If patient starts to experience worsening redness, inflammation, pain contact office, could consider antibiotic treatment

## 2023-06-05 NOTE — ASSESSMENT & PLAN NOTE
Doing well with Topamax 75 mg BID. Tolerating well.   -Remeron could be contributing to weight gain, however it is helping with mental health and insomnia.  We will continue to monitor closely.

## 2023-06-05 NOTE — PROGRESS NOTES
"Chief Complaint  Follow-up (Major depressive disorder, recurrent episode, in full remission)    Subjective       Mallory Mata presents to [facility]    [facility]     Mental health- sees Psychiatrist once a month and therapist every one to two weeks.  Denies SI. Patient notes she is experiencing increase stress and worsening depression, working closely with Psychiatrist and therapist. Recently discontinued trazodone, stopped helping with insomnia with worsening depression, started on Remeron 15 mg nightly, which is helping. Pt is also on Buspar 15 mg TID, Trintellix 20 mg and Rexulti 3 mg daily.     Smoking cessation- patient discontinued chantix about one week ago. Was able to stop smoking, last cigarette at least one month ago or so.      Screening- No family history of colon cancer.   Pap smear- has appointment coming up on the 12 th with GYN for papa smear     Joint pain- on naproxen 500 mg BID     Weight loss- Topamax 75 mg BID, patient did notice improvement reduced appetite and certain food taste differently. Denies and numbness/tingling.     Muscle Spasticity- Doing well on baclofen     Burn-of right forearm, it happened a few days ago.     Allergies- doing well on Flonase and Allegra, and montelukast   Denies chest pain, dyspnea, or falls.     Objective     Vitals:    06/05/23 0948   BP: 119/81   BP Location: Right arm   Patient Position: Sitting   Cuff Size: Adult   Pulse: 103   Resp: 18   Temp: 98.3 °F (36.8 °C)   TempSrc: Temporal   SpO2: 98%   Weight: 94.5 kg (208 lb 6.4 oz)   Height: 167.6 cm (66\")      Wt Readings from Last 3 Encounters:   06/05/23 94.5 kg (208 lb 6.4 oz)   05/01/23 92.1 kg (203 lb)   02/27/23 95.5 kg (210 lb 9.6 oz)      BP Readings from Last 3 Encounters:   06/05/23 119/81   02/27/23 122/80   12/28/22 140/90        Body mass index is 33.64 kg/m².           Physical Exam  Constitutional:       Appearance: Normal appearance.   HENT:      " Head: Normocephalic and atraumatic.      Right Ear: External ear normal.      Left Ear: External ear normal.      Nose: Nose normal.      Mouth/Throat:      Mouth: Mucous membranes are moist.      Pharynx: Oropharynx is clear.   Eyes:      Conjunctiva/sclera: Conjunctivae normal.   Cardiovascular:      Rate and Rhythm: Normal rate and regular rhythm.      Pulses: Normal pulses.      Heart sounds: Normal heart sounds.   Pulmonary:      Effort: Pulmonary effort is normal.      Breath sounds: Normal breath sounds.   Musculoskeletal:        Arms:       Comments: 1.5 inch open superficial erythematous lesion    Skin:     General: Skin is warm and dry.   Neurological:      Mental Status: She is alert and oriented to person, place, and time.   Psychiatric:         Mood and Affect: Mood normal.         Behavior: Behavior normal.         Thought Content: Thought content normal.         Judgment: Judgment normal.          Procedures    Assessment and Plan   Diagnoses and all orders for this visit:    1. Major depressive disorder, recurrent episode, in full remission (Primary)  Assessment & Plan:  -Currently worsening depression, working closely with [facility] and therapist. Denies SI      2. Screen for colon cancer  -     Cologuard - Stool, Per Rectum; Future    3. Allergic rhinitis, unspecified seasonality, unspecified trigger  Assessment & Plan:  Well controlled on current management       4. Hypertension, unspecified type  Assessment & Plan:  Well controlled with healthy diet       5. Immunization due  Assessment & Plan:  Discussed tdap vaccine with patient       6. Anxiety  Assessment & Plan:  -Working with Psychiatrist and Therapist       7. Muscle spasticity  Assessment & Plan:  Well controlled with Baclofen 20 mg TID   -PT 2X a week       8. Cigarette nicotine dependence with nicotine-induced disorder  Assessment & Plan:  -Congratulated Patient for quitting smoking.         9. Vitamin D deficiency, unspecified   Assessment  & Plan:  Normal with most recent blood work, oatient is taking OTC Vit D supplement       10. BMI 33.0-33.9,adult  Assessment & Plan:  Doing well with Topamax 75 mg BID. Tolerating well.   -Remeron could be contributing to weight gain, however it is helping with mental health and insomnia.  We will continue to monitor closely.      11. Burn  Assessment & Plan:  Discussed with patient to use water and soap to clean area twice a day.  If patient starts to experience worsening redness, inflammation, pain contact office, could consider antibiotic treatment       Other orders  -     fluticasone (FLONASE) 50 MCG/ACT nasal spray; 2 sprays by Each Nare route Daily.  Dispense: 16 g; Refill: 3  -     naproxen (NAPROSYN) 500 MG tablet; Take 1 tablet by mouth 2 (Two) Times a Day With Meals.  Dispense: 120 tablet; Refill: 0          Follow Up   Return in about 6 months (around 12/5/2023) for Recheck.  Patient was given instructions and counseling regarding her condition or for health maintenance advice. Please see specific information pulled into the AVS if appropriate.

## 2023-06-06 RX ORDER — TOPIRAMATE 50 MG/1
TABLET, FILM COATED ORAL
Qty: 180 TABLET | Refills: 0 | OUTPATIENT
Start: 2023-06-06

## 2023-06-13 ENCOUNTER — TELEPHONE (OUTPATIENT)
Dept: OBSTETRICS AND GYNECOLOGY | Facility: CLINIC | Age: 48
End: 2023-06-13
Payer: MEDICARE

## 2023-06-13 RX ORDER — TOPIRAMATE 50 MG/1
TABLET, FILM COATED ORAL
Qty: 180 TABLET | Refills: 0 | Status: SHIPPED | OUTPATIENT
Start: 2023-06-13

## 2023-06-14 RX ORDER — FEXOFENADINE HYDROCHLORIDE 60 MG/1
60 TABLET, FILM COATED ORAL DAILY
Qty: 30 TABLET | Refills: 2 | Status: SHIPPED | OUTPATIENT
Start: 2023-06-14 | End: 2023-09-12

## 2023-06-14 RX ORDER — BACLOFEN 20 MG/1
TABLET ORAL
Qty: 90 TABLET | Refills: 0 | Status: SHIPPED | OUTPATIENT
Start: 2023-06-14

## 2023-07-20 NOTE — PROGRESS NOTES
Chief Complaint  Other (Loss of voice) and Nasal Congestion      Patient agrees to participate in a telemedicine evaluation performed by Yvette Mcmahon PA-C. Patient authorizes the electronic transmission of medical information and/or videoconference session. Patient understands that he/she can still pursue face-to-face consultation if desired. Patient understands that as with any technology, telemedicine does have its limitations. There is no guarantee, therefore, that this telemedicine session will eliminate the need for patient to see a provider in person if the provider feels a physical exam or vital signs are necessary to care for the patient. Patient understands and would like to proceed with telemedicine visit via My Chart at this time.      Subjective          Mallory Mata presents to [facility]  Loss of voice- 1 week ago, seems to be improving.  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  She is having a lot of nasal congestion as well.        Objective   Vital Signs:   There were no vitals taken for this visit.    Physical Exam     Gen: well-nourished, no acute distress    HENT: atraumatic, normocephalic    Eyes: extraocular movements intact, no scleral icterus    Lung: breathing comfortably, no cough    Skin: no visible rash, no lesions    Neuro: grossly oriented to person, place, and time. no facial droop     Psych: normal mood and affect      Result Review :          Procedures      Assessment and Plan    Diagnoses and all orders for this visit:    1. Laryngitis (Primary)  Assessment & Plan:  Likely viral etiology, would expect this to continue to improve.  Would recommend voice rest and patient to go from work for another day or 2 to help with voice rest.  Encourage patient to drink warm liquids and ibuprofen for pain or discomfort.      2. Acute URI  Assessment & Plan:  Seems to be improving, would expect continued improvement.  Call or return  Please review. Protocol failed / Has no protocol. [employer] message sent to patient to complete labs pended from [address] [place] [address] 5/19/23.     Requested Prescriptions   Pending Prescriptions Disp Refills    METFORMIN 500 MG Oral Tab [Pharmacy Med Name: METFORMIN  MG TABLET] 180 tablet 0     Sig: TAKE 1 TABLET BY MOUTH TWICE A DAY WITH MEALS       Diabetes Medication Protocol Failed - 7/19/2023  3:00 PM        Failed - Last A1C < 7.5 and within past 6 months     Lab Results   Component Value Date    A1C 8.0 (A) 10/17/2022             Failed - EGFRCR or GFRAA > 50     GFR Evaluation            Failed - GFR in the past 12 months        Passed - In person appointment or virtual visit in the past 6 mos or appointment in next 3 mos     Recent Outpatient Visits              2 months ago Routine physical examination    [facility], [address]    Office Visit    9 months ago Type 2 diabetes mellitus without complication, without long-term current use of insulin [facility])    [address], [place] [address], [place], [place]    Office Visit    1 year ago Left hand pain    [facility], [address],     Office Visit    1 year ago Type 2 diabetes mellitus without complication, without long-term current use of insulin [facility])    [address], [place] [address], [place], [place]    Office Visit    2 years ago Type 2 diabetes mellitus without complication, without long-term current use of insulin [facility])    [address], [place] [address], [place], [place]    Office Visit          Future Appointments         Provider Department Appt Notes    In 4 days Onita Asa, DO [address], [place] [address], [place] 2 month f/u                  Future Appointments         Provider Department Appt Notes    In 4 days Onita Asa, DO [facility] 2 month f/u           Recent Outpatient Visits              2 months ago Routine physical examination    [facility] [place], [address] [place]    Office Visit    9 months ago Type 2 diabetes mellitus without complication, without long-term current use of insulin [facility])    [address], [place] [address], [place], [address],     Office Visit    1 year ago Left hand pain    [facility], [address], [address], [place]    Office Visit    1 year ago Type 2 diabetes mellitus without complication, without long-term current use of insulin [facility])    [address], [place] [address], [place], [address], [place]    Office Visit    2 years ago Type 2 diabetes mellitus without complication, without long-term current use of insulin [facility])    [address], [place] [address], [place], [address], [place]    Office Visit if symptoms persist or worsen.              Follow Up   Return if symptoms worsen or fail to improve.  Patient was given instructions and counseling regarding her condition or for health maintenance advice. Please see specific information pulled into the AVS if appropriate.

## 2023-08-22 RX ORDER — BACLOFEN 20 MG/1
TABLET ORAL
Qty: 90 TABLET | Refills: 0 | Status: SHIPPED | OUTPATIENT
Start: 2023-08-22

## 2023-08-24 RX ORDER — NAPROXEN 500 MG/1
500 TABLET ORAL 2 TIMES DAILY WITH MEALS
Qty: 120 TABLET | Refills: 0 | Status: SHIPPED | OUTPATIENT
Start: 2023-08-24

## 2023-08-24 NOTE — TELEPHONE ENCOUNTER
Caller: Mallory Mata    Relationship: Self    Best call back number: [phone]    Requested Prescriptions:   Requested Prescriptions     Pending Prescriptions Disp Refills    naproxen (NAPROSYN) 500 MG tablet 120 tablet 0     Sig: Take 1 tablet by mouth 2 (Two) Times a Day With Meals.        Pharmacy where request should be sent: [address] - [phone]  - [phone]     Last office visit with prescribing clinician: 6/5/2023   Last telemedicine visit with prescribing clinician: Visit date not found   Next office visit with prescribing clinician: 12/6/2023     Does the patient have less than a 3 day supply:  [x] Yes  [] No

## 2023-09-18 RX ORDER — BACLOFEN 20 MG/1
TABLET ORAL
Qty: 90 TABLET | Refills: 0 | Status: SHIPPED | OUTPATIENT
Start: 2023-09-18

## 2023-09-27 RX ORDER — TOPIRAMATE 50 MG/1
TABLET, FILM COATED ORAL
Qty: 180 TABLET | Refills: 0 | Status: SHIPPED | OUTPATIENT
Start: 2023-09-27

## 2023-10-23 RX ORDER — BACLOFEN 20 MG/1
TABLET ORAL
Qty: 90 TABLET | Refills: 0 | Status: SHIPPED | OUTPATIENT
Start: 2023-10-23 | End: 2023-10-26 | Stop reason: SDUPTHER

## 2023-10-27 RX ORDER — NAPROXEN 500 MG/1
500 TABLET ORAL 2 TIMES DAILY WITH MEALS
Qty: 120 TABLET | Refills: 0 | Status: SHIPPED | OUTPATIENT
Start: 2023-10-27

## 2023-10-27 RX ORDER — BACLOFEN 20 MG/1
20 TABLET ORAL 3 TIMES DAILY
Qty: 90 TABLET | Refills: 0 | Status: SHIPPED | OUTPATIENT
Start: 2023-10-27

## 2023-12-13 ENCOUNTER — OFFICE VISIT (OUTPATIENT)
Dept: ORTHOPEDIC SURGERY | Facility: CLINIC | Age: 48
End: 2023-12-13
Payer: MEDICARE

## 2023-12-13 VITALS — BODY MASS INDEX: 40.18 KG/M2 | WEIGHT: 250 LBS | HEIGHT: 66 IN

## 2023-12-13 DIAGNOSIS — M75.51 BURSITIS OF RIGHT SHOULDER: ICD-10-CM

## 2023-12-13 DIAGNOSIS — M25.511 RIGHT SHOULDER PAIN, UNSPECIFIED CHRONICITY: Primary | ICD-10-CM

## 2023-12-13 RX ORDER — TRIAMCINOLONE ACETONIDE 40 MG/ML
40 INJECTION, SUSPENSION INTRA-ARTICULAR; INTRAMUSCULAR
Status: COMPLETED | OUTPATIENT
Start: 2023-12-13 | End: 2023-12-13

## 2023-12-13 RX ORDER — LIDOCAINE HYDROCHLORIDE 10 MG/ML
5 INJECTION, SOLUTION INFILTRATION; PERINEURAL
Status: COMPLETED | OUTPATIENT
Start: 2023-12-13 | End: 2023-12-13

## 2023-12-13 RX ORDER — TRAMADOL HYDROCHLORIDE 50 MG/1
50 TABLET ORAL EVERY 6 HOURS PRN
Qty: 28 TABLET | Refills: 0 | Status: SHIPPED | OUTPATIENT
Start: 2023-12-13

## 2023-12-13 RX ADMIN — TRIAMCINOLONE ACETONIDE 40 MG: 40 INJECTION, SUSPENSION INTRA-ARTICULAR; INTRAMUSCULAR at 15:19

## 2023-12-13 RX ADMIN — LIDOCAINE HYDROCHLORIDE 5 ML: 10 INJECTION, SOLUTION INFILTRATION; PERINEURAL at 15:19

## 2023-12-13 NOTE — PROGRESS NOTES
"Chief Complaint  Follow-up of the Right Shoulder     Subjective      Mallory Mata presents to [facility] for follow up of the right shoulder.  She has had pain for awhile she has treated conservatively with injections.  She has decrease ROM for forward and upward ROM.  Her last injection was 5/1/23. She reports the pain is in her neck and radiates to her shoulder joint around the AC joint. She notes increase pain all the time.  She is having numbness in her right thumb.  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       No Known Allergies     Social History     Socioeconomic History    Marital status:    Tobacco Use    Smoking status: Every Day     Packs/day: 0.50     Years: 10.00     Additional pack years: 0.00     Total pack years: 5.00     Types: Cigarettes     Start date: 3/21/2006    Smokeless tobacco: Never    Tobacco comments:     Will be stopping 2.1.22   Vaping Use    Vaping Use: Never used   Substance and Sexual Activity    Alcohol use: Not Currently    Drug use: Not Currently     Comment: FORMER IN THE PAST     Sexual activity: Yes     Partners: Male     Birth control/protection: None        I reviewed the patient's chief complaint, history of present illness, review of systems, past medical history, surgical history, family history, social history, medications, and allergy list.     Review of Systems     Constitutional: Denies fevers, chills, weight loss  Cardiovascular: Denies chest pain, shortness of breath  Skin: Denies rashes, acute skin changes  Neurologic: Denies headache, loss of consciousness      Vital Signs:   Ht 167.6 cm (66\")   Wt 113 kg (250 lb)   BMI 40.35 kg/m²          Physical Exam  General: Alert. No acute distress    Ortho Exam        RIGHT SHOULDER Forward flexion 170. Abduction 100. External rotation 60. Internal rotation L5. Positive Cross body adduction. Supraspinatus strength 4/5. Infraspinatus " Strength 4/5. Infrared subscap 4/5. Positive Price. Positive Neer. Negative Apprehension. Negative Lift off. (Negative Obriens. Sensation intact to light touch, median, radial, ulnar nerve. Positive AIN, PIN, ulnar nerve motor. Positive pulses. Positive Impingement signs. Good strength in triceps, biceps, deltoid, wrist extensors and wrist flexors.         Large Joint RIGHT SHOULDER  Date/Time: 12/13/2023 3:19 PM  Consent given by: patient  Site marked: site marked  Timeout: Immediately prior to procedure a time out was called to verify the correct patient, procedure, equipment, support staff and site/side marked as required   Supporting Documentation  Indications: pain   Procedure Details  Location: shoulder -   Preparation: Patient was prepped and draped in the usual sterile fashion  Needle size: 22 G  Medications administered: 5 mL lidocaine 1 %; 40 mg triamcinolone acetonide 40 MG/ML  Patient tolerance: patient tolerated the procedure well with no immediate complications          Imaging Results (Most Recent)       None             Result Review :             Assessment and Plan     Diagnoses and all orders for this visit:    1. Right shoulder pain, unspecified chronicity (Primary)    2. Bursitis of right shoulder    Other orders  -     traMADol (ULTRAM) 50 MG tablet; Take 1 tablet by mouth Every 6 (Six) Hours As Needed for Moderate Pain.  Dispense: 28 tablet; Refill: 0        Discussed the treatment plan with the patient.     Discussed the risks and benefits of conservative measures. The patient expressed understanding and wished to proceed with a right shoulder steroid injection.  She tolerated the injection well.     Prescribed tramadol.     Educated on risk of smoking/nicotine. Discussed options for smoking cessation. and Call or return if worsening symptoms.    Follow Up     PRN    Patient was given instructions and counseling regarding her condition or for health maintenance advice. Please see specific  information pulled into the AVS if appropriate.     Scribed for Sb Carrasquillo MD by Sandra Simeon MA.  12/13/23   15:08 EST      I have personally performed the services described in this document as scribed by the above individual and it is both accurate and complete. Sb Carrasquillo MD 12/13/23

## 2023-12-19 RX ORDER — NAPROXEN 500 MG/1
500 TABLET ORAL 2 TIMES DAILY WITH MEALS
Qty: 90 TABLET | Refills: 1 | Status: SHIPPED | OUTPATIENT
Start: 2023-12-19

## 2023-12-19 RX ORDER — MONTELUKAST SODIUM 10 MG/1
10 TABLET ORAL DAILY
Qty: 90 TABLET | Refills: 1 | Status: SHIPPED | OUTPATIENT
Start: 2023-12-19

## 2023-12-27 ENCOUNTER — TELEPHONE (OUTPATIENT)
Dept: INTERNAL MEDICINE | Facility: CLINIC | Age: 48
End: 2023-12-27
Payer: MEDICARE

## 2023-12-27 NOTE — TELEPHONE ENCOUNTER
"Called and spoke with pt, verified she was taking Tramadol, finished her script today. Pt states she is receiving Cortizone shots as well from ortho as well. Ortho advised pt he would fill script one time and if pt wanted to continue medication she would need to follow up with primary care. Pt stated she just feels like her joints are \"stiff and locked up\".   "

## 2023-12-27 NOTE — TELEPHONE ENCOUNTER
Caller: Mallory Mata    Relationship: Self    Best call back number: [phone]     What medication are you requesting: SOMETHING TO HELP WITH PAIN     What are your current symptoms: RIGHT SHOULDER PAIN     If a prescription is needed, what is your preferred pharmacy and phone number:      [address] - [phone]  - [phone]

## 2023-12-29 NOTE — TELEPHONE ENCOUNTER
Has she tried Tylenol arthritis.  If she has not I would recommend that first however if she has tried it and it has not worked I do not mind to take over the tramadol for her.  If we do the tramadol I would like to start with only 1 a day

## 2023-12-29 NOTE — TELEPHONE ENCOUNTER
She has not tried the tylenol arthritis. I advised her that you recommended to try it first. She understood. I let her know if that did not help to let us know. She understood and had no further questions or concerns at this time,

## 2024-01-03 RX ORDER — TOPIRAMATE 50 MG/1
50 TABLET, FILM COATED ORAL 2 TIMES DAILY
Qty: 180 TABLET | Refills: 0 | Status: SHIPPED | OUTPATIENT
Start: 2024-01-03

## 2024-01-03 RX ORDER — BACLOFEN 20 MG/1
20 TABLET ORAL 3 TIMES DAILY
Qty: 90 TABLET | Refills: 0 | Status: SHIPPED | OUTPATIENT
Start: 2024-01-03

## 2024-02-12 RX ORDER — BACLOFEN 20 MG/1
20 TABLET ORAL 3 TIMES DAILY
Qty: 90 TABLET | Refills: 0 | Status: SHIPPED | OUTPATIENT
Start: 2024-02-12

## 2024-03-13 ENCOUNTER — OFFICE VISIT (OUTPATIENT)
Dept: INTERNAL MEDICINE | Facility: CLINIC | Age: 49
End: 2024-03-13

## 2024-03-13 VITALS
HEIGHT: 66 IN | TEMPERATURE: 98.1 F | OXYGEN SATURATION: 98 % | DIASTOLIC BLOOD PRESSURE: 90 MMHG | SYSTOLIC BLOOD PRESSURE: 138 MMHG | BODY MASS INDEX: 39.86 KG/M2 | HEART RATE: 105 BPM | WEIGHT: 248 LBS

## 2024-03-13 DIAGNOSIS — R53.83 FATIGUE, UNSPECIFIED TYPE: ICD-10-CM

## 2024-03-13 DIAGNOSIS — Z13.220 SCREENING FOR LIPID DISORDERS: ICD-10-CM

## 2024-03-13 DIAGNOSIS — F33.42 MAJOR DEPRESSIVE DISORDER, RECURRENT EPISODE, IN FULL REMISSION: ICD-10-CM

## 2024-03-13 DIAGNOSIS — R03.0 ELEVATED BP WITHOUT DIAGNOSIS OF HYPERTENSION: ICD-10-CM

## 2024-03-13 DIAGNOSIS — R79.9 ABNORMAL FINDING OF BLOOD CHEMISTRY, UNSPECIFIED: ICD-10-CM

## 2024-03-13 DIAGNOSIS — Z12.31 ENCOUNTER FOR SCREENING MAMMOGRAM FOR MALIGNANT NEOPLASM OF BREAST: Primary | ICD-10-CM

## 2024-03-13 DIAGNOSIS — G24.9 DYSTONIA: ICD-10-CM

## 2024-03-13 DIAGNOSIS — R06.83 SNORING: ICD-10-CM

## 2024-03-13 DIAGNOSIS — G43.809 OTHER MIGRAINE WITHOUT STATUS MIGRAINOSUS, NOT INTRACTABLE: ICD-10-CM

## 2024-03-13 DIAGNOSIS — F41.9 ANXIETY: ICD-10-CM

## 2024-03-13 DIAGNOSIS — M62.838 MUSCLE SPASTICITY: ICD-10-CM

## 2024-03-13 DIAGNOSIS — E67.8 OTHER SPECIFIED HYPERALIMENTATION: ICD-10-CM

## 2024-03-13 DIAGNOSIS — J30.9 ALLERGIC RHINITIS, UNSPECIFIED SEASONALITY, UNSPECIFIED TRIGGER: ICD-10-CM

## 2024-03-13 PROBLEM — M54.2 NECK PAIN: Status: RESOLVED | Noted: 2021-06-28 | Resolved: 2024-03-13

## 2024-03-13 PROBLEM — Z23 IMMUNIZATION DUE: Status: RESOLVED | Noted: 2023-02-27 | Resolved: 2024-03-13

## 2024-03-13 RX ORDER — BREXPIPRAZOLE 3 MG/1
1 TABLET ORAL DAILY
Qty: 90 TABLET | Refills: 1 | Status: SHIPPED | OUTPATIENT
Start: 2024-03-13

## 2024-03-13 RX ORDER — FLUTICASONE PROPIONATE 50 MCG
2 SPRAY, SUSPENSION (ML) NASAL DAILY
Qty: 16 G | Refills: 3 | Status: SHIPPED | OUTPATIENT
Start: 2024-03-13

## 2024-03-13 RX ORDER — DESVENLAFAXINE SUCCINATE 50 MG/1
50 TABLET, EXTENDED RELEASE ORAL
Qty: 90 TABLET | Refills: 1 | Status: SHIPPED | OUTPATIENT
Start: 2024-03-13

## 2024-03-13 NOTE — PROGRESS NOTES
Chief Complaint  Major depressive disorder, recurrent episode, in full remis (Pt started the Trintellix for 2 weeks since she ran out of Pristiq 50 QD) and Obesity (Pt states that she has been picking up weight. Pt states that she eats right but she works at home sits at the computer. )    Subjective      History of Present Illness  The patient presents for evaluation of multiple medical concerns.    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 She thinks she brought herself out from working overtime all night as she was working 10 hours a week overtime. She has been tired a lot lately. She is tired when she wakes up in the morning. Her son has told her that she snores sometimes. She has gained a lot of weight and can not seem to lose it. She thinks it is because she has been going from sitting to laying down. She has been eating right. She eats breakfast, not too big, and lunch. She hardly eats fried foods. She may have candy here and there, but not that often. It keeps her awake sometimes because she gets tired throughout the day. She drinks water, energy drinks, and coffee. She drinks about 3 to 4 bottles of water a day.    She is doing okay mental health wise on Rexulti. She was on Trintellix because she ran out of the Pristiq. She liked the Pristiq a lot better for her. She is on Rexulti.    She has been out of Flonase for a few weeks now. She can tell that her voice changes.    She is doing okay with her muscles as long as she takes her medication. She was off her medication for about a week and could tell that her muscles were overreacting. She is back on her muscle relaxers. She is still seeing PM and R for Botox injections. She is on Topamax to suppress her appetite, which is helping. She takes it every day.    She takes doxepin, which helps her sleep. She did not take it last night because she was tired. If she wakes up around 12:00 or 1:00, she  "does not take it because it makes her tired at work.    She checks her blood pressure every now and then at [facility]. It has been running no more than 120/70.         Objective   Vital Signs:   Vitals:    03/13/24 0919   BP: 138/90   Pulse: 105   Temp: 98.1 °F (36.7 °C)   TempSrc: Temporal   SpO2: 98%   Weight: 112 kg (248 lb)   Height: 167.6 cm (66\")   PainSc: 0-No pain     Body mass index is 40.03 kg/m².    Wt Readings from Last 3 Encounters:   03/13/24 112 kg (248 lb)   12/13/23 113 kg (250 lb)   12/06/23 113 kg (250 lb)     BP Readings from Last 3 Encounters:   03/13/24 138/90   12/06/23 142/84   07/13/23 110/74       Health Maintenance   Topic Date Due    TDAP/TD VACCINES (1 - Tdap) Never done    MAMMOGRAM  04/29/2022    COVID-19 Vaccine (1 - 2023-24 season) 03/13/2025 (Originally 9/1/2023)    BMI FOLLOWUP  05/01/2024    ANNUAL PHYSICAL  03/13/2025    COLORECTAL CANCER SCREENING  06/19/2026    PAP SMEAR  07/13/2026    Pneumococcal Vaccine 0-64 (3 of 3 - PPSV23 or PCV20) 05/24/2042    HEPATITIS C SCREENING  Completed    INFLUENZA VACCINE  Completed       Physical Exam  Vitals reviewed.   Constitutional:       Appearance: Normal appearance. She is well-developed.   HENT:      Head: Normocephalic and atraumatic.      Right Ear: External ear normal.      Left Ear: External ear normal.   Eyes:      Conjunctiva/sclera: Conjunctivae normal.      Pupils: Pupils are equal, round, and reactive to light.   Cardiovascular:      Rate and Rhythm: Normal rate and regular rhythm.      Heart sounds: No murmur heard.     No friction rub. No gallop.      Comments: Slight leg swelling bilaterally  Pulmonary:      Effort: Pulmonary effort is normal.      Breath sounds: Normal breath sounds. No wheezing or rhonchi.   Musculoskeletal:      Comments: Left arm and leg dystonia and contractures   Skin:     General: Skin is warm and dry.   Neurological:      Mental Status: She is alert and oriented to person, place, and time. "   Psychiatric:         Mood and Affect: Affect normal.         Behavior: Behavior normal.         Thought Content: Thought content normal.        Physical Exam        Result Review :  The following data was reviewed by: Tiffany Salomon MD on 03/13/2024:         Results        Procedures          Assessment & Plan  1. Fatigue.  The most common reason for people to be tired is sleep apnea. I will obtain basic blood work. I will refer her to sleep medicine. Disucssed topamax could contribute, she may back off on that    2. Weight gain.  Sometimes Rexulti can cause weight gain, but if she is doing well from a mental health standpoint, I do not recommend changing it.     3. Mental health.  She will continue Pristiq, rexulti and buspar  She will continue Rexulti. She was provided with savings cards to help with the cost of medications.     4. Allergies.  She is getting into the allergy season. I will refill her Flonase.    5. Sleep issues.  She will continue doxepin.    6. Muscle spasms.  She is back on her muscle relaxers.     7. Elevated BP without diagnosis of HTN  She will monito rand will try to get back to the gym        Assessment & Plan  Encounter for screening mammogram for malignant neoplasm of breast    Snoring    Muscle spasticity    Allergic rhinitis, unspecified seasonality, unspecified trigger    Anxiety    Major depressive disorder, recurrent episode, in full remission    Dystonia    Other migraine without status migrainosus, not intractable            Fatigue, unspecified type    Screening for lipid disorders    Elevated BP without diagnosis of hypertension    Abnormal finding of blood chemistry, unspecified    Other specified hyperalimentation      Orders Placed This Encounter   Procedures    Mammo Screening Digital Tomosynthesis Bilateral With CAD    Comprehensive Metabolic Panel    TSH    Lipid Panel    Iron Profile    Vitamin B12 & Folate    Magnesium    Vitamin D,25-Hydroxy    Ambulatory Referral  to Sleep Medicine    CBC & Differential     New Medications Ordered This Visit   Medications    desvenlafaxine (PRISTIQ) 50 MG 24 hr tablet     Sig: Take 1 tablet by mouth every night at bedtime.     Dispense:  90 tablet     Refill:  1    fluticasone (FLONASE) 50 MCG/ACT nasal spray     Si sprays by Each Nare route Daily.     Dispense:  16 g     Refill:  3    Rexulti 3 MG tablet     Sig: Take 1 tablet by mouth Daily.     Dispense:  90 tablet     Refill:  1                      FOLLOW UP  Return in about 4 months (around 2024).  Patient was given instructions and counseling regarding her condition or for health maintenance advice. Please see specific information pulled into the AVS if appropriate.       Tiffany Salomon MD  24  10:08 EDT    CURRENT & DISCONTINUED MEDICATIONS  Current Outpatient Medications   Medication Instructions    baclofen (LIORESAL) 20 mg, Oral, 3 Times Daily    busPIRone (BUSPAR) 15 mg, Oral, 3 Times Daily    desvenlafaxine (PRISTIQ) 50 mg, Oral, Every Night at Bedtime    doxepin (SINEQUAN) 50 mg, Oral, Every Night at Bedtime    fluticasone (FLONASE) 50 MCG/ACT nasal spray 2 sprays, Each Nare, Daily    montelukast (SINGULAIR) 10 mg, Oral, Daily    naproxen (NAPROSYN) 500 mg, Oral, 2 Times Daily With Meals    Rexulti 3 mg, Oral, Daily    topiramate (TOPAMAX) 50 mg, Oral, 2 Times Daily       Medications Discontinued During This Encounter   Medication Reason    traMADol (ULTRAM) 50 MG tablet *Therapy completed    Trintellix 20 MG tablet     Rexulti 3 MG tablet Reorder    fluticasone (FLONASE) 50 MCG/ACT nasal spray Reorder    desvenlafaxine (PRISTIQ) 50 MG 24 hr tablet Reorder        Patient or patient representative verbalized consent for the use of Ambient Listening during the visit with  Tiffany Salomon MD for chart documentation. 3/13/2024  10:08 EDT

## 2024-03-14 ENCOUNTER — REFERRAL TRIAGE (OUTPATIENT)
Age: 49
End: 2024-03-14

## 2024-03-19 ENCOUNTER — PATIENT OUTREACH (OUTPATIENT)
Age: 49
End: 2024-03-19

## 2024-03-19 NOTE — OUTREACH NOTE
SW attempted contact with UTRx1. SW will attempt again in a couple of business days.     [facility] -   Ambulatory Case Management    3/19/2024, 09:25 EDT

## 2024-03-21 ENCOUNTER — PATIENT OUTREACH (OUTPATIENT)
Age: 49
End: 2024-03-21

## 2024-03-21 NOTE — OUTREACH NOTE
SW attempted to contact pt a second time, and scheduled a third call for one week out. SW will attempt again in a week.     [facility] -   Ambulatory Case Management    3/21/2024, 11:32 EDT

## 2024-03-28 ENCOUNTER — PATIENT OUTREACH (OUTPATIENT)
Age: 49
End: 2024-03-28

## 2024-03-28 NOTE — OUTREACH NOTE
UTRx3. SW has attempted to contact pt with UTRx3. SW will D/c pt due to UTR. Pt may contact SW and received services, should they be needed in the future.     [facility] -   Ambulatory Case Management    3/28/2024, 09:42 EDT

## 2024-04-26 ENCOUNTER — TELEPHONE (OUTPATIENT)
Dept: INTERNAL MEDICINE | Facility: CLINIC | Age: 49
End: 2024-04-26

## 2024-04-26 NOTE — TELEPHONE ENCOUNTER
Patient called office stating her BP has been high 105/140, 104/140, 125/167, patient does not take any BP medications, patient went recently to the  for URI, pt states she has not started treatment yet, she has sent a [employer] message yesterday in regards her BP, I have ask patient if she could check her BP right now and she stated she does not have a BP cuff to check, patient denies headaches, dizziness, chest pain, blurry vision. Please advise

## 2024-04-29 RX ORDER — BACLOFEN 20 MG/1
20 TABLET ORAL 3 TIMES DAILY
Qty: 90 TABLET | Refills: 0 | Status: SHIPPED | OUTPATIENT
Start: 2024-04-29

## 2024-04-30 ENCOUNTER — PATIENT ROUNDING (BHMG ONLY) (OUTPATIENT)
Dept: URGENT CARE | Facility: CLINIC | Age: 49
End: 2024-04-30

## 2024-04-30 NOTE — ED NOTES
Thank you for letting us care for you in your recent visit to our urgent care center. We would love to hear about your experience with us. Was this the first time you have visited our location?    We’re always looking for ways to make our patients’ experiences even better. Do you have any recommendations on ways we may improve?     I appreciate you taking the time to respond. Please be on the lookout for a survey about your recent visit from [employer] via text or email. We would greatly appreciate if you could fill that out and turn it back in. We want your voice to be heard and we value your feedback.   Thank you for choosing [facility] for your healthcare needs.

## 2024-05-01 ENCOUNTER — CLINICAL SUPPORT (OUTPATIENT)
Dept: INTERNAL MEDICINE | Facility: CLINIC | Age: 49
End: 2024-05-01

## 2024-05-01 VITALS
HEART RATE: 105 BPM | RESPIRATION RATE: 16 BRPM | DIASTOLIC BLOOD PRESSURE: 102 MMHG | OXYGEN SATURATION: 93 % | SYSTOLIC BLOOD PRESSURE: 162 MMHG

## 2024-05-01 DIAGNOSIS — I10 HYPERTENSION, UNSPECIFIED TYPE: Primary | ICD-10-CM

## 2024-05-01 DIAGNOSIS — Z13.9 ENCOUNTER FOR SCREENING INVOLVING SOCIAL DETERMINANTS OF HEALTH (SDOH): Primary | ICD-10-CM

## 2024-05-01 PROCEDURE — 99211 OFF/OP EST MAY X REQ PHY/QHP: CPT | Performed by: INTERNAL MEDICINE

## 2024-05-01 RX ORDER — LISINOPRIL 20 MG/1
20 TABLET ORAL DAILY
Qty: 30 TABLET | Refills: 0 | Status: SHIPPED | OUTPATIENT
Start: 2024-05-01

## 2024-05-01 RX ORDER — ESCITALOPRAM OXALATE 5 MG/1
5 TABLET ORAL DAILY
Qty: 90 TABLET | Refills: 1 | Status: SHIPPED | OUTPATIENT
Start: 2024-05-01

## 2024-05-01 NOTE — PROGRESS NOTES
"  Blood Pressure Check     Mallory Mata, 1975, presents to the clinic for a blood pressure check    Reason for walk in check: Symptomatic; no SOB; no numbness and/or tingling.  No cough.  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  Patient seen recently at  for rhinosinusitis, allergic conjuctivitis bilateral eyes and HTN.  Orders to start taking Augmentin that was ordered for her as well as Claritin, however only recently started that medication.  Patient confirms she is no longer taking Mucinex, Patanol eye drops, Desvenlafaxine, and Rexulti.  Updated medication list for patient.  Patient confirmed she is Self-Pay, however had previously enrolled in Medicare.  Patient confirmed to \"re-enroll\" she was told she would have to pay $300 and she simply couldn't afford that.       Current Outpatient Medications:     baclofen (LIORESAL) 20 MG tablet, TAKE 1 TABLET BY MOUTH THREE TIMES DAILY, Disp: 90 tablet, Rfl: 0    busPIRone (BUSPAR) 15 MG tablet, Take 1 tablet by mouth 3 (Three) Times a Day., Disp: , Rfl:     escitalopram (Lexapro) 5 MG tablet, Take 1 tablet by mouth Daily., Disp: 90 tablet, Rfl: 1    fluticasone (FLONASE) 50 MCG/ACT nasal spray, 2 sprays by Each Nare route Daily., Disp: 16 g, Rfl: 3    lisinopril (PRINIVIL,ZESTRIL) 20 MG tablet, Take 1 tablet by mouth Daily. New medication for patient., Disp: 30 tablet, Rfl: 0    loratadine (Claritin) 10 MG tablet, Take 1 tablet by mouth Daily for 30 days., Disp: 30 tablet, Rfl: 0    montelukast (SINGULAIR) 10 MG tablet, Take 1 tablet by mouth Daily., Disp: 90 tablet, Rfl: 1    naproxen (NAPROSYN) 500 MG tablet, Take 1 tablet by mouth 2 (Two) Times a Day With Meals., Disp: 90 tablet, Rfl: 1    olopatadine (PATANOL) 0.1 % ophthalmic solution, Administer 1 drop to both eyes 2 (Two) Times a Day As Needed for Allergies., Disp: 5 mL, Rfl: 0    Rexulti 3 MG tablet, Take 1 " "tablet by mouth Daily., Disp: 90 tablet, Rfl: 1    topiramate (TOPAMAX) 50 MG tablet, Take 1 tablet by mouth 2 (Two) Times a Day., Disp: 180 tablet, Rfl: 0      Blood Pressure Reading Today: 162/102 Right arm Sitting Manual              **cannot take BP in Left Arm  Symptoms:  None    Previous Readings:   BP Readings from Last 3 Encounters:   05/01/24 (!) 162/102   04/23/24 (!) 147/105   03/13/24 138/90       Provider Consulted: Dr. Salomon     Follow-Up Plan: Provider notified of symptoms.  Start taking Lisinopril 20mg - 1 PO Qdaily; QTY #30 NR.  Dr. Salomon wanted to confirm if patient has taken Sertraline in the past and if it worked for the patient.  Patient reported that she had tried Sertraline and \"it never helped me.\"  Patient also confirmed that in the past she has followed Mental Health for anxiety and depression, however she just couldn't afford it now.   Scheduled appointment for BP recheck with Nurse on Tuesday, 5/07/2024. Referral to be placed for patient to be followed by Case Management and Social Work department for assistance with insurance related issues as well as medication.      Kyara Lu RN  05/01/24, 11:27 EDT    "

## 2024-05-02 ENCOUNTER — REFERRAL TRIAGE (OUTPATIENT)
Age: 49
End: 2024-05-02

## 2024-05-03 ENCOUNTER — PATIENT OUTREACH (OUTPATIENT)
Age: 49
End: 2024-05-03

## 2024-05-03 NOTE — OUTREACH NOTE
Social Work Assessment  Questions/Answers      Flowsheet Row Most Recent Value   Referral Source physician   Reason for Consult insurance concerns   Preferred Language English   People in Home spouse   Current Living Arrangements apartment   In the past 12 months has the electric, gas, oil, or water company threatened to shut off services in your home? No   Primary Care Provided by self   Family Caregiver if Needed spouse   Quality of Family Relationships helpful   Source of Income disability   Financial/Environmental Concerns insurance inadequate          SDOH updated and reviewed with the patient during this program:  --     Disabilities: Not At Risk (5/3/2024)    Disabilities     Concentrating, Remembering, or Making Decisions Difficulty: no     Doing Errands Independently Difficulty: no      --     Employment: Not At Risk (5/3/2024)    Employment     Do you want help finding or keeping work or a job?: I do not need or want help      Financial Resource Strain: Medium Risk (5/3/2024)    Overall Financial Resource Strain (CARDIA)     Difficulty of Paying Living Expenses: Somewhat hard      --     Food Insecurity: No Food Insecurity (5/3/2024)    Hunger Vital Sign     Worried About Running Out of Food in the Last Year: Never true     Ran Out of Food in the Last Year: Never true      --     Housing Stability: Unknown (5/3/2024)    Housing Stability     Current Living Arrangements: apartment      --     Transportation Needs: No Transportation Needs (5/3/2024)    PRAPARE - Transportation     Lack of Transportation (Medical): No     Lack of Transportation (Non-Medical): No      --     Utilities: Not At Risk (5/3/2024)    [facility] Utilities     Threatened with loss of utilities: No     Patient Outreach    MSW sent patient information about Medicare Savings Program for assistance with premium costs, etc. MSW available if future needs related to insurance.    Rachna RAMOS -   Ambulatory Case Management    5/3/2024,  14:07 EDT

## 2024-05-05 ENCOUNTER — HOSPITAL ENCOUNTER (EMERGENCY)
Facility: HOSPITAL | Age: 49
Discharge: HOME | End: 2024-05-06
Attending: EMERGENCY MEDICINE | Admitting: EMERGENCY MEDICINE

## 2024-05-05 DIAGNOSIS — F14.10 COCAINE ABUSE: Primary | ICD-10-CM

## 2024-05-05 DIAGNOSIS — I95.9 HYPOTENSION, UNSPECIFIED HYPOTENSION TYPE: ICD-10-CM

## 2024-05-05 LAB
ALBUMIN SERPL-MCNC: 4.1 G/DL (ref 3.5–5.2)
ALBUMIN/GLOB SERPL: 1.2 G/DL
ALP SERPL-CCNC: 84 U/L (ref 39–117)
ALT SERPL W P-5'-P-CCNC: 24 U/L (ref 1–33)
AMPHET+METHAMPHET UR QL: NEGATIVE
ANION GAP SERPL CALCULATED.3IONS-SCNC: 15.1 MMOL/L (ref 5–15)
APAP SERPL-MCNC: <5 MCG/ML (ref 0–30)
AST SERPL-CCNC: 19 U/L (ref 1–32)
BARBITURATES UR QL SCN: NEGATIVE
BASOPHILS # BLD AUTO: 0.07 10*3/MM3 (ref 0–0.2)
BASOPHILS NFR BLD AUTO: 0.7 % (ref 0–1.5)
BENZODIAZ UR QL SCN: POSITIVE
BILIRUB SERPL-MCNC: 0.6 MG/DL (ref 0–1.2)
BUN SERPL-MCNC: 10 MG/DL (ref 6–20)
BUN/CREAT SERPL: 11.5 (ref 7–25)
CALCIUM SPEC-SCNC: 9.2 MG/DL (ref 8.6–10.5)
CANNABINOIDS SERPL QL: POSITIVE
CHLORIDE SERPL-SCNC: 105 MMOL/L (ref 98–107)
CO2 SERPL-SCNC: 20.9 MMOL/L (ref 22–29)
COCAINE UR QL: POSITIVE
CREAT SERPL-MCNC: 0.87 MG/DL (ref 0.57–1)
DEPRECATED RDW RBC AUTO: 40.4 FL (ref 37–54)
EGFRCR SERPLBLD CKD-EPI 2021: 83.3 ML/MIN/1.73
EOSINOPHIL # BLD AUTO: 0.07 10*3/MM3 (ref 0–0.4)
EOSINOPHIL NFR BLD AUTO: 0.7 % (ref 0.3–6.2)
ERYTHROCYTE [DISTWIDTH] IN BLOOD BY AUTOMATED COUNT: 12.7 % (ref 12.3–15.4)
ETHANOL BLD-MCNC: <10 MG/DL (ref 0–10)
ETHANOL UR QL: <0.01 %
FENTANYL UR-MCNC: NEGATIVE NG/ML
GLOBULIN UR ELPH-MCNC: 3.3 GM/DL
GLUCOSE SERPL-MCNC: 133 MG/DL (ref 65–99)
HCG INTACT+B SERPL-ACNC: <0.5 MIU/ML
HCT VFR BLD AUTO: 43.8 % (ref 34–46.6)
HGB BLD-MCNC: 14.1 G/DL (ref 12–15.9)
HOLD SPECIMEN: NORMAL
IMM GRANULOCYTES # BLD AUTO: 0.04 10*3/MM3 (ref 0–0.05)
IMM GRANULOCYTES NFR BLD AUTO: 0.4 % (ref 0–0.5)
LYMPHOCYTES # BLD AUTO: 1.65 10*3/MM3 (ref 0.7–3.1)
LYMPHOCYTES NFR BLD AUTO: 17.7 % (ref 19.6–45.3)
MAGNESIUM SERPL-MCNC: 2 MG/DL (ref 1.6–2.6)
MCH RBC QN AUTO: 28.1 PG (ref 26.6–33)
MCHC RBC AUTO-ENTMCNC: 32.2 G/DL (ref 31.5–35.7)
MCV RBC AUTO: 87.3 FL (ref 79–97)
METHADONE UR QL SCN: NEGATIVE
MONOCYTES # BLD AUTO: 0.77 10*3/MM3 (ref 0.1–0.9)
MONOCYTES NFR BLD AUTO: 8.2 % (ref 5–12)
NEUTROPHILS NFR BLD AUTO: 6.74 10*3/MM3 (ref 1.7–7)
NEUTROPHILS NFR BLD AUTO: 72.3 % (ref 42.7–76)
NRBC BLD AUTO-RTO: 0 /100 WBC (ref 0–0.2)
OPIATES UR QL: NEGATIVE
OXYCODONE UR QL SCN: NEGATIVE
PLATELET # BLD AUTO: 281 10*3/MM3 (ref 140–450)
PMV BLD AUTO: 10.2 FL (ref 6–12)
POTASSIUM SERPL-SCNC: 3.2 MMOL/L (ref 3.5–5.2)
PROT SERPL-MCNC: 7.4 G/DL (ref 6–8.5)
RBC # BLD AUTO: 5.02 10*6/MM3 (ref 3.77–5.28)
SALICYLATES SERPL-MCNC: <0.3 MG/DL
SODIUM SERPL-SCNC: 141 MMOL/L (ref 136–145)
TSH SERPL DL<=0.05 MIU/L-ACNC: 0.89 UIU/ML (ref 0.27–4.2)
WBC NRBC COR # BLD AUTO: 9.34 10*3/MM3 (ref 3.4–10.8)
WHOLE BLOOD HOLD COAG: NORMAL
WHOLE BLOOD HOLD SPECIMEN: NORMAL
WHOLE BLOOD HOLD SPECIMEN: NORMAL

## 2024-05-05 PROCEDURE — 99284 EMERGENCY DEPT VISIT MOD MDM: CPT

## 2024-05-05 PROCEDURE — P9612 CATHETERIZE FOR URINE SPEC: HCPCS

## 2024-05-05 PROCEDURE — 80307 DRUG TEST PRSMV CHEM ANLYZR: CPT | Performed by: EMERGENCY MEDICINE

## 2024-05-05 PROCEDURE — 80053 COMPREHEN METABOLIC PANEL: CPT | Performed by: EMERGENCY MEDICINE

## 2024-05-05 PROCEDURE — 25810000003 SODIUM CHLORIDE 0.9 % SOLUTION

## 2024-05-05 PROCEDURE — 36415 COLL VENOUS BLD VENIPUNCTURE: CPT

## 2024-05-05 PROCEDURE — 93005 ELECTROCARDIOGRAM TRACING: CPT | Performed by: EMERGENCY MEDICINE

## 2024-05-05 PROCEDURE — 83735 ASSAY OF MAGNESIUM: CPT

## 2024-05-05 PROCEDURE — 84443 ASSAY THYROID STIM HORMONE: CPT

## 2024-05-05 PROCEDURE — 85025 COMPLETE CBC W/AUTO DIFF WBC: CPT | Performed by: EMERGENCY MEDICINE

## 2024-05-05 PROCEDURE — 82077 ASSAY SPEC XCP UR&BREATH IA: CPT | Performed by: EMERGENCY MEDICINE

## 2024-05-05 PROCEDURE — 80179 DRUG ASSAY SALICYLATE: CPT | Performed by: EMERGENCY MEDICINE

## 2024-05-05 PROCEDURE — 84702 CHORIONIC GONADOTROPIN TEST: CPT

## 2024-05-05 PROCEDURE — 80143 DRUG ASSAY ACETAMINOPHEN: CPT | Performed by: EMERGENCY MEDICINE

## 2024-05-05 RX ORDER — SODIUM CHLORIDE 0.9 % (FLUSH) 0.9 %
10 SYRINGE (ML) INJECTION AS NEEDED
Status: DISCONTINUED | OUTPATIENT
Start: 2024-05-05 | End: 2024-05-06 | Stop reason: HOSPADM

## 2024-05-05 RX ORDER — POTASSIUM CHLORIDE 750 MG/1
40 CAPSULE, EXTENDED RELEASE ORAL ONCE
Status: COMPLETED | OUTPATIENT
Start: 2024-05-05 | End: 2024-05-05

## 2024-05-05 RX ADMIN — SODIUM CHLORIDE 1000 ML: 9 INJECTION, SOLUTION INTRAVENOUS at 23:45

## 2024-05-05 RX ADMIN — POTASSIUM CHLORIDE 40 MEQ: 750 CAPSULE, EXTENDED RELEASE ORAL at 23:48

## 2024-05-06 ENCOUNTER — PATIENT OUTREACH (OUTPATIENT)
Age: 49
End: 2024-05-06

## 2024-05-06 VITALS
TEMPERATURE: 97.9 F | RESPIRATION RATE: 17 BRPM | HEIGHT: 67 IN | WEIGHT: 228.62 LBS | OXYGEN SATURATION: 94 % | SYSTOLIC BLOOD PRESSURE: 107 MMHG | DIASTOLIC BLOOD PRESSURE: 81 MMHG | BODY MASS INDEX: 35.88 KG/M2 | HEART RATE: 100 BPM

## 2024-05-06 LAB
QT INTERVAL: 413 MS
QTC INTERVAL: 539 MS

## 2024-05-18 ENCOUNTER — HOSPITAL ENCOUNTER (EMERGENCY)
Facility: HOSPITAL | Age: 49
Discharge: HOME | End: 2024-05-18
Attending: EMERGENCY MEDICINE

## 2024-05-18 ENCOUNTER — APPOINTMENT (OUTPATIENT)
Dept: CT IMAGING | Facility: HOSPITAL | Age: 49
End: 2024-05-18

## 2024-05-18 VITALS
OXYGEN SATURATION: 99 % | BODY MASS INDEX: 37.8 KG/M2 | RESPIRATION RATE: 18 BRPM | TEMPERATURE: 97.9 F | WEIGHT: 235.23 LBS | HEART RATE: 100 BPM | DIASTOLIC BLOOD PRESSURE: 94 MMHG | HEIGHT: 66 IN | SYSTOLIC BLOOD PRESSURE: 135 MMHG

## 2024-05-18 DIAGNOSIS — G43.909 MIGRAINE WITHOUT STATUS MIGRAINOSUS, NOT INTRACTABLE, UNSPECIFIED MIGRAINE TYPE: Primary | ICD-10-CM

## 2024-05-18 LAB
ABO GROUP BLD: NORMAL
ALBUMIN SERPL-MCNC: 3.6 G/DL (ref 3.5–5.2)
ALBUMIN/GLOB SERPL: 1 G/DL
ALP SERPL-CCNC: 115 U/L (ref 39–117)
ALT SERPL W P-5'-P-CCNC: 31 U/L (ref 1–33)
ANION GAP SERPL CALCULATED.3IONS-SCNC: 13.5 MMOL/L (ref 5–15)
AST SERPL-CCNC: 19 U/L (ref 1–32)
BASOPHILS # BLD AUTO: 0.05 10*3/MM3 (ref 0–0.2)
BASOPHILS NFR BLD AUTO: 0.4 % (ref 0–1.5)
BILIRUB SERPL-MCNC: 0.5 MG/DL (ref 0–1.2)
BLD GP AB SCN SERPL QL: NEGATIVE
BUN SERPL-MCNC: 7 MG/DL (ref 6–20)
BUN/CREAT SERPL: 8.9 (ref 7–25)
CALCIUM SPEC-SCNC: 9 MG/DL (ref 8.6–10.5)
CHLORIDE SERPL-SCNC: 99 MMOL/L (ref 98–107)
CO2 SERPL-SCNC: 23.5 MMOL/L (ref 22–29)
CREAT SERPL-MCNC: 0.79 MG/DL (ref 0.57–1)
D-LACTATE SERPL-SCNC: 1.4 MMOL/L (ref 0.5–2)
D-LACTATE SERPL-SCNC: 2.9 MMOL/L (ref 0.5–2)
DEPRECATED RDW RBC AUTO: 39.4 FL (ref 37–54)
EGFRCR SERPLBLD CKD-EPI 2021: 93.6 ML/MIN/1.73
EOSINOPHIL # BLD AUTO: 0.05 10*3/MM3 (ref 0–0.4)
EOSINOPHIL NFR BLD AUTO: 0.4 % (ref 0.3–6.2)
ERYTHROCYTE [DISTWIDTH] IN BLOOD BY AUTOMATED COUNT: 12.6 % (ref 12.3–15.4)
GLOBULIN UR ELPH-MCNC: 3.5 GM/DL
GLUCOSE SERPL-MCNC: 229 MG/DL (ref 65–99)
HCT VFR BLD AUTO: 39.6 % (ref 34–46.6)
HGB BLD-MCNC: 13 G/DL (ref 12–15.9)
HOLD SPECIMEN: NORMAL
IMM GRANULOCYTES # BLD AUTO: 0.04 10*3/MM3 (ref 0–0.05)
IMM GRANULOCYTES NFR BLD AUTO: 0.3 % (ref 0–0.5)
LYMPHOCYTES # BLD AUTO: 1.25 10*3/MM3 (ref 0.7–3.1)
LYMPHOCYTES NFR BLD AUTO: 9.9 % (ref 19.6–45.3)
MCH RBC QN AUTO: 28.1 PG (ref 26.6–33)
MCHC RBC AUTO-ENTMCNC: 32.8 G/DL (ref 31.5–35.7)
MCV RBC AUTO: 85.7 FL (ref 79–97)
MONOCYTES # BLD AUTO: 1.07 10*3/MM3 (ref 0.1–0.9)
MONOCYTES NFR BLD AUTO: 8.4 % (ref 5–12)
NEUTROPHILS NFR BLD AUTO: 10.22 10*3/MM3 (ref 1.7–7)
NEUTROPHILS NFR BLD AUTO: 80.6 % (ref 42.7–76)
NRBC BLD AUTO-RTO: 0 /100 WBC (ref 0–0.2)
PLATELET # BLD AUTO: 306 10*3/MM3 (ref 140–450)
PMV BLD AUTO: 10 FL (ref 6–12)
POTASSIUM SERPL-SCNC: 3.6 MMOL/L (ref 3.5–5.2)
PROT SERPL-MCNC: 7.1 G/DL (ref 6–8.5)
RBC # BLD AUTO: 4.62 10*6/MM3 (ref 3.77–5.28)
RH BLD: POSITIVE
SODIUM SERPL-SCNC: 136 MMOL/L (ref 136–145)
T&S EXPIRATION DATE: NORMAL
WBC NRBC COR # BLD AUTO: 12.68 10*3/MM3 (ref 3.4–10.8)
WHOLE BLOOD HOLD COAG: NORMAL
WHOLE BLOOD HOLD SPECIMEN: NORMAL

## 2024-05-18 PROCEDURE — 80053 COMPREHEN METABOLIC PANEL: CPT | Performed by: EMERGENCY MEDICINE

## 2024-05-18 PROCEDURE — 25010000002 DROPERIDOL PER 5 MG: Performed by: NURSE PRACTITIONER

## 2024-05-18 PROCEDURE — 86900 BLOOD TYPING SEROLOGIC ABO: CPT | Performed by: EMERGENCY MEDICINE

## 2024-05-18 PROCEDURE — 25010000002 KETOROLAC TROMETHAMINE PER 15 MG: Performed by: NURSE PRACTITIONER

## 2024-05-18 PROCEDURE — 86901 BLOOD TYPING SEROLOGIC RH(D): CPT | Performed by: EMERGENCY MEDICINE

## 2024-05-18 PROCEDURE — 96375 TX/PRO/DX INJ NEW DRUG ADDON: CPT

## 2024-05-18 PROCEDURE — 96374 THER/PROPH/DIAG INJ IV PUSH: CPT

## 2024-05-18 PROCEDURE — 85025 COMPLETE CBC W/AUTO DIFF WBC: CPT | Performed by: EMERGENCY MEDICINE

## 2024-05-18 PROCEDURE — 25010000002 DEXAMETHASONE PER 1 MG: Performed by: NURSE PRACTITIONER

## 2024-05-18 PROCEDURE — 83605 ASSAY OF LACTIC ACID: CPT | Performed by: EMERGENCY MEDICINE

## 2024-05-18 PROCEDURE — 70450 CT HEAD/BRAIN W/O DYE: CPT

## 2024-05-18 PROCEDURE — 86850 RBC ANTIBODY SCREEN: CPT | Performed by: EMERGENCY MEDICINE

## 2024-05-18 PROCEDURE — 96361 HYDRATE IV INFUSION ADD-ON: CPT

## 2024-05-18 PROCEDURE — 99284 EMERGENCY DEPT VISIT MOD MDM: CPT

## 2024-05-18 PROCEDURE — 25810000003 SODIUM CHLORIDE 0.9 % SOLUTION: Performed by: NURSE PRACTITIONER

## 2024-05-18 PROCEDURE — 36415 COLL VENOUS BLD VENIPUNCTURE: CPT

## 2024-05-18 PROCEDURE — 25010000002 DIPHENHYDRAMINE PER 50 MG: Performed by: NURSE PRACTITIONER

## 2024-05-18 RX ORDER — DIPHENHYDRAMINE HYDROCHLORIDE 50 MG/ML
25 INJECTION INTRAMUSCULAR; INTRAVENOUS ONCE
Status: COMPLETED | OUTPATIENT
Start: 2024-05-18 | End: 2024-05-18

## 2024-05-18 RX ORDER — DROPERIDOL 2.5 MG/ML
2.5 INJECTION, SOLUTION INTRAMUSCULAR; INTRAVENOUS ONCE
Status: COMPLETED | OUTPATIENT
Start: 2024-05-18 | End: 2024-05-18

## 2024-05-18 RX ORDER — SODIUM CHLORIDE 0.9 % (FLUSH) 0.9 %
10 SYRINGE (ML) INJECTION AS NEEDED
Status: DISCONTINUED | OUTPATIENT
Start: 2024-05-18 | End: 2024-05-18 | Stop reason: HOSPADM

## 2024-05-18 RX ORDER — DEXAMETHASONE SODIUM PHOSPHATE 4 MG/ML
4 INJECTION, SOLUTION INTRA-ARTICULAR; INTRALESIONAL; INTRAMUSCULAR; INTRAVENOUS; SOFT TISSUE ONCE
Status: COMPLETED | OUTPATIENT
Start: 2024-05-18 | End: 2024-05-18

## 2024-05-18 RX ORDER — KETOROLAC TROMETHAMINE 15 MG/ML
15 INJECTION, SOLUTION INTRAMUSCULAR; INTRAVENOUS ONCE
Status: COMPLETED | OUTPATIENT
Start: 2024-05-18 | End: 2024-05-18

## 2024-05-18 RX ADMIN — SODIUM CHLORIDE 1000 ML: 9 INJECTION, SOLUTION INTRAVENOUS at 11:20

## 2024-05-18 RX ADMIN — SODIUM CHLORIDE 1000 ML: 9 INJECTION, SOLUTION INTRAVENOUS at 13:10

## 2024-05-18 RX ADMIN — KETOROLAC TROMETHAMINE 15 MG: 15 INJECTION, SOLUTION INTRAMUSCULAR; INTRAVENOUS at 11:27

## 2024-05-18 RX ADMIN — DEXAMETHASONE SODIUM PHOSPHATE 4 MG: 4 INJECTION, SOLUTION INTRAMUSCULAR; INTRAVENOUS at 11:24

## 2024-05-18 RX ADMIN — DIPHENHYDRAMINE HYDROCHLORIDE 25 MG: 50 INJECTION, SOLUTION INTRAMUSCULAR; INTRAVENOUS at 11:26

## 2024-05-18 RX ADMIN — DROPERIDOL 2.5 MG: 2.5 INJECTION, SOLUTION INTRAMUSCULAR; INTRAVENOUS at 11:22

## 2024-05-18 NOTE — ED PROVIDER NOTES
Time: 9:56 AM EDT  Date of encounter:  5/18/2024  Independent Historian/Clinical History and Information was obtained by:   Patient    History is limited by: N/A    Chief Complaint: migraine      History of Present Illness:  Patient is a 48 y.o. year old female who presents to the emergency department for evaluation of migraine headache x 2 weeks, states pain is on right side of head. States she has not had a migraine in over 15 years, states this is worse than her previous headaches. She also states she has had some chills and her stools are dark. She denies visual changes, dizziness, weakness or shortness of breath.    HPI    Patient Care Team  Primary Care Provider: Tiffany Salomon MD    Past Medical History:     No Known Allergies  Past Medical History:   Diagnosis Date    Abnormal Pap smear of cervix     Allergic 2000    unsure of month/date    Allergic rhinitis     Anxiety     Contracted ligament     LUE and LLE    Contracture of muscle 04/18/2016    [facility] SITES, WILL CONT BACLOFEN AND BOTOX    Cramp in muscle     Depression     Heart murmur     High blood pressure     Hypertension     Meningitis 01/08/2016    THIS OCCURED AS A CHILD, BUT SHE HAS A SIGNIFICANT AMOUNT OF CONTRACTURES ON HER LEFT SIDE WILL SCHEDULE PHYSICAL THERAPY REFER TO PM&R START BACLOFEN     Migraine headache     Murmur, heart     Recurrent major depressive disorder, in full remission 09/11/2017    DOING WELL ON TRINTELLIX SAYS IT KEEPS HER SMILING :)     Rheumatic fever     Syphilis     Tendinitis of hip, left 10/05/2017    Trochanteric bursitis of left hip 10/05/2017     Past Surgical History:   Procedure Laterality Date    OTHER SURGICAL HISTORY Left 1995    BROKEN BONES , LEFT ARM.  1988 & 1995     OTHER SURGICAL HISTORY      METAL IMPLANTS     ULNAR COLLATERAL LIGAMENT REPAIR      ligaments released left arm    WRIST SURGERY  1995    metal plate left wrist     Family History   Problem Relation Age of Onset    Prostate cancer  Father     COPD Father         COPD    Diabetes type II Mother     Anxiety disorder Mother         anxiety    Depression Mother     Drug abuse Mother     Hyperlipidemia Mother     Miscarriages / Stillbirths Mother         miscarriage    Anxiety disorder Son         Not diagnosed but show signs/anxiety    Uterine cancer Maternal Aunt 30        to clarify if uterine or cervical    Stroke Other     Heart disease Other     Breast cancer Neg Hx     Colon cancer Neg Hx     Deep vein thrombosis Neg Hx     Pulmonary embolism Neg Hx        Home Medications:  Prior to Admission medications    Medication Sig Start Date End Date Taking? Authorizing Provider   baclofen (LIORESAL) 20 MG tablet TAKE 1 TABLET BY MOUTH THREE TIMES DAILY 4/29/24   Yvette Mcmahon PA-C   busPIRone (BUSPAR) 15 MG tablet Take 1 tablet by mouth 3 (Three) Times a Day.    Provider, MD Carlos   escitalopram (Lexapro) 5 MG tablet Take 1 tablet by mouth Daily. 5/1/24   Tiffany Salomon MD   fluticasone (FLONASE) 50 MCG/ACT nasal spray 2 sprays by Each Nare route Daily. 3/13/24   Tiffany Salomon MD   lisinopril (PRINIVIL,ZESTRIL) 20 MG tablet Take 1 tablet by mouth Daily. New medication for patient. 5/1/24   Tiffany Salomon MD   loratadine (Claritin) 10 MG tablet Take 1 tablet by mouth Daily for 30 days. 4/23/24 5/23/24  James Ventura MD   montelukast (SINGULAIR) 10 MG tablet Take 1 tablet by mouth Daily. 12/19/23   Tiffany Salomon MD   naproxen (NAPROSYN) 500 MG tablet Take 1 tablet by mouth 2 (Two) Times a Day With Meals. 12/19/23   Tiffany Salomon MD   olopatadine (PATANOL) 0.1 % ophthalmic solution Administer 1 drop to both eyes 2 (Two) Times a Day As Needed for Allergies.  Patient not taking: Reported on 5/1/2024 4/23/24   James Ventura MD   Rexulti 3 MG tablet Take 1 tablet by mouth Daily.  Patient not taking: Reported on 5/1/2024 3/13/24   Tiffany Salomon MD   topiramate (TOPAMAX) 50 MG tablet Take 1 tablet by mouth 2  "(Two) Times a Day. 1/3/24   Tiffany Salomon MD        Social History:   Social History     Tobacco Use    Smoking status: Former     Current packs/day: 0.00     Average packs/day: 0.5 packs/day for 15.9 years (7.9 ttl pk-yrs)     Types: Cigarettes     Start date: 3/21/2006     Quit date: 2022     Years since quittin.2    Smokeless tobacco: Never    Tobacco comments:     Will be stopping 2..   Vaping Use    Vaping status: Every Day    Substances: Nicotine, Flavoring    Devices: Disposable, Pre-filled pod   Substance Use Topics    Alcohol use: Not Currently     Comment: social    Drug use: Not Currently     Comment: FORMER IN THE PAST          Review of Systems:  Review of Systems   Constitutional: Negative.    Eyes: Negative.    Respiratory: Negative.     Cardiovascular: Negative.    Gastrointestinal: Negative.    Endocrine: Negative.    Genitourinary: Negative.    Musculoskeletal: Negative.    Skin: Negative.    Allergic/Immunologic: Negative.    Neurological:  Positive for headaches.   Hematological: Negative.    Psychiatric/Behavioral: Negative.          Physical Exam:  /90   Pulse 99   Temp 97.9 °F (36.6 °C) (Oral)   Resp 18   Ht 167.6 cm (66\")   Wt 107 kg (235 lb 3.7 oz)   LMP 2024 (Approximate)   SpO2 99%   BMI 37.97 kg/m²     Physical Exam  Constitutional:       Appearance: Normal appearance.   HENT:      Head: Normocephalic and atraumatic.      Nose: Nose normal.      Mouth/Throat:      Mouth: Mucous membranes are moist.   Eyes:      Pupils: Pupils are equal, round, and reactive to light.   Cardiovascular:      Rate and Rhythm: Normal rate and regular rhythm.      Pulses: Normal pulses.   Pulmonary:      Effort: Pulmonary effort is normal.      Breath sounds: Normal breath sounds.   Abdominal:      General: Abdomen is flat. Bowel sounds are normal.      Palpations: Abdomen is soft.   Musculoskeletal:         General: Normal range of motion.      Cervical back: Normal range " of motion.   Skin:     General: Skin is warm and dry.      Capillary Refill: Capillary refill takes less than 2 seconds.   Neurological:      General: No focal deficit present.      Mental Status: She is alert and oriented to person, place, and time. Mental status is at baseline.   Psychiatric:         Mood and Affect: Mood normal.                  Procedures:  Procedures      Medical Decision Making:      Comorbidities that affect care:    Hypertension, Obesity    External Notes reviewed:    None      The following orders were placed and all results were independently analyzed by me:  Orders Placed This Encounter   Procedures    CT Head Without Contrast    [facility] Draw    Comprehensive Metabolic Panel    Occult Blood, Fecal By Immunoassay - Stool, Per Rectum    CBC Auto Differential    Lactic Acid, Plasma    STAT Lactic Acid, Reflex    NPO Diet NPO Type: Strict NPO    Undress & Gown    Measure Blood Pressure    Vital Signs    Orthostatic Blood Pressure    Pulse Oximetry    Oxygen Therapy- Nasal Cannula; Titrate 1-6 LPM Per SpO2; 90 - 95%    Type & Screen    Insert Peripheral IV    CBC & Differential    Green Top (Gel)    Lavender Top    Gold Top - SST    Light Blue Top    Extra Tubes    Gray Top       Medications Given in the Emergency Department:  Medications   sodium chloride 0.9 % flush 10 mL (has no administration in time range)   sodium chloride 0.9 % bolus 1,000 mL (0 mL Intravenous Stopped 5/18/24 1413)   ketorolac (TORADOL) injection 15 mg (15 mg Intravenous Given 5/18/24 1127)   dexAMETHasone (DECADRON) injection 4 mg (4 mg Intravenous Given 5/18/24 1124)   diphenhydrAMINE (BENADRYL) injection 25 mg (25 mg Intravenous Given 5/18/24 1126)   droperidol (INAPSINE) injection 2.5 mg (2.5 mg Intravenous Given 5/18/24 1122)   sodium chloride 0.9 % bolus 1,000 mL (0 mL Intravenous Stopped 5/18/24 1413)        ED Course:    ED Course as of 05/18/24 1537   Sat May 18, 2024   1301 Pt advises headache resolved, CT  head unremarkable, no acute changes. She is appropriate for outpatient follow up, no meds prescribed, strict return precautions discussed on discharge. [TP]      ED Course User Index  [TP] Nivia Coleman APRN       Labs:    Lab Results (last 24 hours)       Procedure Component Value Units Date/Time    CBC & Differential [[id number]]  (Abnormal) Collected: 05/18/24 1005    Specimen: Blood from Arm, Right Updated: 05/18/24 1015    Narrative:      The following orders were created for panel order CBC & Differential.  Procedure                               Abnormality         Status                     ---------                               -----------         ------                     CBC Auto Differential[[id number]]        Abnormal            Final result                 Please view results for these tests on the individual orders.    Comprehensive Metabolic Panel [[id number]]  (Abnormal) Collected: 05/18/24 1005    Specimen: Blood from Arm, Right Updated: 05/18/24 1038     Glucose 229 mg/dL      BUN 7 mg/dL      Creatinine 0.79 mg/dL      Sodium 136 mmol/L      Potassium 3.6 mmol/L      Chloride 99 mmol/L      CO2 23.5 mmol/L      Calcium 9.0 mg/dL      Total Protein 7.1 g/dL      Albumin 3.6 g/dL      ALT (SGPT) 31 U/L      AST (SGOT) 19 U/L      Alkaline Phosphatase 115 U/L      Total Bilirubin 0.5 mg/dL      Globulin 3.5 gm/dL      A/G Ratio 1.0 g/dL      BUN/Creatinine Ratio 8.9     Anion Gap 13.5 mmol/L      eGFR 93.6 mL/min/1.73     Narrative:      GFR Normal >60  Chronic Kidney Disease <60  Kidney Failure <15      CBC Auto Differential [[id number]]  (Abnormal) Collected: 05/18/24 1005    Specimen: Blood from Arm, Right Updated: 05/18/24 1015     WBC 12.68 10*3/mm3      RBC 4.62 10*6/mm3      Hemoglobin 13.0 g/dL      Hematocrit 39.6 %      MCV 85.7 fL      MCH 28.1 pg      MCHC 32.8 g/dL      RDW 12.6 %      RDW-SD 39.4 fl      MPV 10.0 fL      Platelets 306 10*3/mm3      Neutrophil % 80.6 %       Lymphocyte % 9.9 %      Monocyte % 8.4 %      Eosinophil % 0.4 %      Basophil % 0.4 %      Immature Grans % 0.3 %      Neutrophils, Absolute 10.22 10*3/mm3      Lymphocytes, Absolute 1.25 10*3/mm3      Monocytes, Absolute 1.07 10*3/mm3      Eosinophils, Absolute 0.05 10*3/mm3      Basophils, Absolute 0.05 10*3/mm3      Immature Grans, Absolute 0.04 10*3/mm3      nRBC 0.0 /100 WBC     Lactic Acid, Plasma [[id number]]  (Abnormal) Collected: 05/18/24 1005    Specimen: Blood from Arm, Right Updated: 05/18/24 1101     Lactate 2.9 mmol/L     STAT Lactic Acid, Reflex [[id number]]  (Normal) Collected: 05/18/24 1455    Specimen: Blood from Arm, Right Updated: 05/18/24 1530     Lactate 1.4 mmol/L              Imaging:    CT Head Without Contrast    Result Date: 5/18/2024  CT HEAD WO CONTRAST-  Date of Exam: 5/18/2024 12:17 PM  Indication: headache.  Comparison: None available.  Technique: Axial CT images were obtained of the head without contrast administration.  Reconstructed coronal and sagittal images were also obtained. Automated exposure control and iterative construction methods were used.   Findings: There is some motion artifact. There is no hemorrhage, edema, or mass effect. There are no abnormal extra-axial fluid collections. The CSF spaces/ventricles are normal. There is no fluid in the visualized paranasal sinuses/middle ear cavities      Impression: No acute process demonstrated    Electronically Signed ByMarcela Zhao On:5/18/2024 12:41 PM         Differential Diagnosis and Discussion:    Headache: Differential diagnosis includes but is not limited to migraine, cluster headache, hypertension, tumor, subarachnoid bleeding, pseudotumor cerebri, temporal arteritis, infections, tension headache, and TMJ syndrome.    All labs were reviewed and interpreted by me.  CT scan radiology impression was interpreted by me.    MDM     Amount and/or Complexity of Data Reviewed  Clinical lab tests: reviewed  Tests in the  radiology section of CPT®: reviewed                 Patient Care Considerations:           Consultants/Shared Management Plan:    None    Social Determinants of Health:    Patient is independent, reliable, and has access to care.       Disposition and Care Coordination:    Discharged: The patient is suitable and stable for discharge with no need for consideration of admission.    I have explained the patient´s condition, diagnoses and treatment plan based on the information available to me at this time. I have answered questions and addressed any concerns. The patient has a good  understanding of the patient´s diagnosis, condition, and treatment plan as can be expected at this point. The vital signs have been stable. The patient´s condition is stable and appropriate for discharge from the emergency department.      The patient will pursue further outpatient evaluation with the primary care physician or other designated or consulting physician as outlined in the discharge instructions. They are agreeable to this plan of care and follow-up instructions have been explained in detail. The patient has received these instructions in written format and has expressed an understanding of the discharge instructions. The patient is aware that any significant change in condition or worsening of symptoms should prompt an immediate return to this or the closest emergency department or call to 911.  I have explained discharge medications and the need for follow up with the patient/caretakers. This was also printed in the discharge instructions. Patient was discharged with the following medications and follow up:      Medication List      No changes were made to your prescriptions during this visit.      Tiffany Salomon MD  [address] [place] [id number] [phone]      As needed       Final diagnoses:   Migraine without status migrainosus, not intractable, unspecified migraine type        ED Disposition       ED  Disposition   Discharge    Condition   Stable    Comment   --               This medical record created using voice recognition software.             Nivia Coleman, APRN  05/18/24 1535

## 2024-07-03 RX ORDER — BACLOFEN 20 MG/1
20 TABLET ORAL 3 TIMES DAILY
Qty: 90 TABLET | Refills: 0 | Status: SHIPPED | OUTPATIENT
Start: 2024-07-03

## 2024-07-05 RX ORDER — NAPROXEN 500 MG/1
500 TABLET ORAL 2 TIMES DAILY WITH MEALS
Qty: 90 TABLET | Refills: 0 | Status: SHIPPED | OUTPATIENT
Start: 2024-07-05

## 2024-07-05 RX ORDER — TOPIRAMATE 50 MG/1
50 TABLET, FILM COATED ORAL 2 TIMES DAILY
Qty: 180 TABLET | Refills: 0 | Status: SHIPPED | OUTPATIENT
Start: 2024-07-05

## 2024-07-11 RX ORDER — BUSPIRONE HYDROCHLORIDE 15 MG/1
15 TABLET ORAL 3 TIMES DAILY
Qty: 90 TABLET | Refills: 1 | Status: SHIPPED | OUTPATIENT
Start: 2024-07-11

## 2024-08-05 ENCOUNTER — APPOINTMENT (OUTPATIENT)
Dept: GENERAL RADIOLOGY | Facility: HOSPITAL | Age: 49
End: 2024-08-05

## 2024-08-05 ENCOUNTER — HOSPITAL ENCOUNTER (EMERGENCY)
Facility: HOSPITAL | Age: 49
Discharge: HOME | End: 2024-08-05
Attending: EMERGENCY MEDICINE | Admitting: EMERGENCY MEDICINE

## 2024-08-05 VITALS
BODY MASS INDEX: 36.56 KG/M2 | OXYGEN SATURATION: 99 % | RESPIRATION RATE: 16 BRPM | DIASTOLIC BLOOD PRESSURE: 86 MMHG | HEIGHT: 66 IN | SYSTOLIC BLOOD PRESSURE: 133 MMHG | HEART RATE: 94 BPM | TEMPERATURE: 98.6 F | WEIGHT: 227.51 LBS

## 2024-08-05 DIAGNOSIS — F14.90 COCAINE USE: Primary | ICD-10-CM

## 2024-08-05 DIAGNOSIS — R07.9 CHEST PAIN, UNSPECIFIED TYPE: ICD-10-CM

## 2024-08-05 LAB
ALBUMIN SERPL-MCNC: 4.2 G/DL (ref 3.5–5.2)
ALBUMIN/GLOB SERPL: 1.4 G/DL
ALP SERPL-CCNC: 86 U/L (ref 39–117)
ALT SERPL W P-5'-P-CCNC: 19 U/L (ref 1–33)
ANION GAP SERPL CALCULATED.3IONS-SCNC: 10.7 MMOL/L (ref 5–15)
AST SERPL-CCNC: 19 U/L (ref 1–32)
BASOPHILS # BLD AUTO: 0.05 10*3/MM3 (ref 0–0.2)
BASOPHILS NFR BLD AUTO: 0.9 % (ref 0–1.5)
BILIRUB SERPL-MCNC: 0.5 MG/DL (ref 0–1.2)
BUN SERPL-MCNC: 6 MG/DL (ref 6–20)
BUN/CREAT SERPL: 8.3 (ref 7–25)
CALCIUM SPEC-SCNC: 9.1 MG/DL (ref 8.6–10.5)
CHLORIDE SERPL-SCNC: 105 MMOL/L (ref 98–107)
CO2 SERPL-SCNC: 21.3 MMOL/L (ref 22–29)
CREAT SERPL-MCNC: 0.72 MG/DL (ref 0.57–1)
DEPRECATED RDW RBC AUTO: 41.8 FL (ref 37–54)
EGFRCR SERPLBLD CKD-EPI 2021: 103.9 ML/MIN/1.73
EOSINOPHIL # BLD AUTO: 0.14 10*3/MM3 (ref 0–0.4)
EOSINOPHIL NFR BLD AUTO: 2.6 % (ref 0.3–6.2)
ERYTHROCYTE [DISTWIDTH] IN BLOOD BY AUTOMATED COUNT: 13.5 % (ref 12.3–15.4)
GEN 5 2HR TROPONIN T REFLEX: <6 NG/L
GLOBULIN UR ELPH-MCNC: 3 GM/DL
GLUCOSE SERPL-MCNC: 125 MG/DL (ref 65–99)
HCT VFR BLD AUTO: 40.5 % (ref 34–46.6)
HGB BLD-MCNC: 13.3 G/DL (ref 12–15.9)
HOLD SPECIMEN: NORMAL
HOLD SPECIMEN: NORMAL
IMM GRANULOCYTES # BLD AUTO: 0.01 10*3/MM3 (ref 0–0.05)
IMM GRANULOCYTES NFR BLD AUTO: 0.2 % (ref 0–0.5)
LIPASE SERPL-CCNC: 20 U/L (ref 13–60)
LYMPHOCYTES # BLD AUTO: 1.8 10*3/MM3 (ref 0.7–3.1)
LYMPHOCYTES NFR BLD AUTO: 34 % (ref 19.6–45.3)
MAGNESIUM SERPL-MCNC: 2.1 MG/DL (ref 1.6–2.6)
MCH RBC QN AUTO: 27.7 PG (ref 26.6–33)
MCHC RBC AUTO-ENTMCNC: 32.8 G/DL (ref 31.5–35.7)
MCV RBC AUTO: 84.2 FL (ref 79–97)
MONOCYTES # BLD AUTO: 0.48 10*3/MM3 (ref 0.1–0.9)
MONOCYTES NFR BLD AUTO: 9.1 % (ref 5–12)
NEUTROPHILS NFR BLD AUTO: 2.81 10*3/MM3 (ref 1.7–7)
NEUTROPHILS NFR BLD AUTO: 53.2 % (ref 42.7–76)
NRBC BLD AUTO-RTO: 0 /100 WBC (ref 0–0.2)
NT-PROBNP SERPL-MCNC: 73.6 PG/ML (ref 0–450)
PLATELET # BLD AUTO: 295 10*3/MM3 (ref 140–450)
PMV BLD AUTO: 9.9 FL (ref 6–12)
POTASSIUM SERPL-SCNC: 3.5 MMOL/L (ref 3.5–5.2)
PROT SERPL-MCNC: 7.2 G/DL (ref 6–8.5)
RBC # BLD AUTO: 4.81 10*6/MM3 (ref 3.77–5.28)
SODIUM SERPL-SCNC: 137 MMOL/L (ref 136–145)
TROPONIN T DELTA: NORMAL
TROPONIN T SERPL HS-MCNC: <6 NG/L
WBC NRBC COR # BLD AUTO: 5.29 10*3/MM3 (ref 3.4–10.8)
WHOLE BLOOD HOLD COAG: NORMAL
WHOLE BLOOD HOLD SPECIMEN: NORMAL

## 2024-08-05 PROCEDURE — 96374 THER/PROPH/DIAG INJ IV PUSH: CPT

## 2024-08-05 PROCEDURE — 93010 ELECTROCARDIOGRAM REPORT: CPT | Performed by: INTERNAL MEDICINE

## 2024-08-05 PROCEDURE — 83880 ASSAY OF NATRIURETIC PEPTIDE: CPT | Performed by: EMERGENCY MEDICINE

## 2024-08-05 PROCEDURE — 25010000002 LORAZEPAM PER 2 MG: Performed by: EMERGENCY MEDICINE

## 2024-08-05 PROCEDURE — 84484 ASSAY OF TROPONIN QUANT: CPT | Performed by: EMERGENCY MEDICINE

## 2024-08-05 PROCEDURE — 80053 COMPREHEN METABOLIC PANEL: CPT | Performed by: EMERGENCY MEDICINE

## 2024-08-05 PROCEDURE — 93005 ELECTROCARDIOGRAM TRACING: CPT | Performed by: EMERGENCY MEDICINE

## 2024-08-05 PROCEDURE — 36415 COLL VENOUS BLD VENIPUNCTURE: CPT

## 2024-08-05 PROCEDURE — 83735 ASSAY OF MAGNESIUM: CPT | Performed by: EMERGENCY MEDICINE

## 2024-08-05 PROCEDURE — 85025 COMPLETE CBC W/AUTO DIFF WBC: CPT | Performed by: EMERGENCY MEDICINE

## 2024-08-05 PROCEDURE — 71045 X-RAY EXAM CHEST 1 VIEW: CPT

## 2024-08-05 PROCEDURE — 99284 EMERGENCY DEPT VISIT MOD MDM: CPT

## 2024-08-05 PROCEDURE — 83690 ASSAY OF LIPASE: CPT | Performed by: EMERGENCY MEDICINE

## 2024-08-05 RX ORDER — SODIUM CHLORIDE 0.9 % (FLUSH) 0.9 %
10 SYRINGE (ML) INJECTION AS NEEDED
Status: DISCONTINUED | OUTPATIENT
Start: 2024-08-05 | End: 2024-08-05 | Stop reason: HOSPADM

## 2024-08-05 RX ORDER — ASPIRIN 81 MG/1
324 TABLET, CHEWABLE ORAL ONCE
Status: DISCONTINUED | OUTPATIENT
Start: 2024-08-05 | End: 2024-08-05 | Stop reason: HOSPADM

## 2024-08-05 RX ORDER — LORAZEPAM 2 MG/ML
1 INJECTION INTRAMUSCULAR ONCE
Status: COMPLETED | OUTPATIENT
Start: 2024-08-05 | End: 2024-08-05

## 2024-08-05 RX ORDER — DESVENLAFAXINE SUCCINATE 50 MG/1
50 TABLET, EXTENDED RELEASE ORAL
COMMUNITY
Start: 2024-06-09

## 2024-08-05 RX ADMIN — LORAZEPAM 1 MG: 2 INJECTION INTRAMUSCULAR; INTRAVENOUS at 03:52

## 2024-08-05 RX ADMIN — Medication 10 ML: at 03:53

## 2024-08-05 NOTE — Clinical Note
[facility] EMERGENCY ROOM  [address] [place] [place] [address]  Phone: [phone]  Fax: [phone]    Mallory Mata was seen and treated in our emergency department on 8/5/2024.  She may return to work on 08/06/2024.         Thank you for choosing [facility].    Ansley Staton RN

## 2024-08-05 NOTE — ED PROVIDER NOTES
Time: 2:41 AM EDT  Date of encounter:  8/5/2024  Independent Historian/Clinical History and Information was obtained by:   Patient    History is limited by: N/A    Chief Complaint: Chest pain      History of Present Illness:  Patient is a 49 y.o. year old female who presents to the emergency department for evaluation of chest pain    Patient states she was lying at rest tonight attempted to go to sleep she believes some chest discomfort woke her from sleep.  She states she had been dealing with stress anxiety in her life lately and she resorted to using cocaine a little while ago and states that now she is having some anterior chest discomfort.  She denies any radiation to her arms neck or back.  She denies any associated shortness of breath nausea or vomiting.  States she feels somewhat panicked and anxious at this time.     HPI    Patient Care Team  Primary Care Provider: Tiffany Salomon MD    Past Medical History:     No Known Allergies  Past Medical History:   Diagnosis Date    Abnormal Pap smear of cervix     Allergic 2000    unsure of month/date    Allergic rhinitis     Anxiety     Contracted ligament     LUE and LLE    Contracture of muscle 04/18/2016    [facility], WILL CONT BACLOFEN AND BOTOX    Cramp in muscle     Depression     Heart murmur     High blood pressure     Hypertension     Meningitis 01/08/2016    THIS OCCURED AS A CHILD, BUT SHE HAS A SIGNIFICANT AMOUNT OF CONTRACTURES ON HER LEFT SIDE WILL SCHEDULE PHYSICAL THERAPY REFER TO PM&R START BACLOFEN     Migraine headache     Murmur, heart     Recurrent major depressive disorder, in full remission 09/11/2017    DOING WELL ON TRINTELLIX SAYS IT KEEPS HER SMILING :)     Rheumatic fever     Syphilis     Tendinitis of hip, left 10/05/2017    Trochanteric bursitis of left hip 10/05/2017     Past Surgical History:   Procedure Laterality Date    OTHER SURGICAL HISTORY Left 1995    BROKEN BONES , LEFT ARM.  1988 & 1995     OTHER SURGICAL HISTORY       METAL IMPLANTS     ULNAR COLLATERAL LIGAMENT REPAIR      ligaments released left arm    WRIST SURGERY  1995    metal plate left wrist     Family History   Problem Relation Age of Onset    Prostate cancer Father     COPD Father         COPD    Diabetes type II Mother     Anxiety disorder Mother         anxiety    Depression Mother     Drug abuse Mother     Hyperlipidemia Mother     Miscarriages / Stillbirths Mother         miscarriage    Anxiety disorder Son         Not diagnosed but show signs/anxiety    Uterine cancer Maternal Aunt 30        to clarify if uterine or cervical    Stroke Other     Heart disease Other     Breast cancer Neg Hx     Colon cancer Neg Hx     Deep vein thrombosis Neg Hx     Pulmonary embolism Neg Hx        Home Medications:  Prior to Admission medications    Medication Sig Start Date End Date Taking? Authorizing Provider   baclofen (LIORESAL) 20 MG tablet TAKE 1 TABLET BY MOUTH THREE TIMES DAILY 7/3/24   Yvette Mcmahon PA-C   busPIRone (BUSPAR) 15 MG tablet Take 1 tablet by mouth 3 (Three) Times a Day. 7/11/24   Tiffany Salomon MD   escitalopram (Lexapro) 5 MG tablet Take 1 tablet by mouth Daily. 5/1/24   Tiffany Salomon MD   fluticasone (FLONASE) 50 MCG/ACT nasal spray 2 sprays by Each Nare route Daily. 3/13/24   Tiffany Salomon MD   lisinopril (PRINIVIL,ZESTRIL) 20 MG tablet Take 1 tablet by mouth Daily. New medication for patient. 5/1/24   Tiffany Salomon MD   loratadine (Claritin) 10 MG tablet Take 1 tablet by mouth Daily for 30 days. 4/23/24 5/23/24  James Ventura MD   montelukast (SINGULAIR) 10 MG tablet Take 1 tablet by mouth Daily. 12/19/23   Tiffany Salomon MD   naproxen (NAPROSYN) 500 MG tablet TAKE 1 TABLET BY MOUTH TWICE DAILY WITH MEALS 7/5/24   Tiffany Salomon MD   olopatadine (PATANOL) 0.1 % ophthalmic solution Administer 1 drop to both eyes 2 (Two) Times a Day As Needed for Allergies.  Patient not taking: Reported on 5/1/2024 4/23/24   Lorenzo  "MD James   Rexulti 3 MG tablet Take 1 tablet by mouth Daily.  Patient not taking: Reported on 2024 3/13/24   Tiffany Salomon MD   topiramate (TOPAMAX) 50 MG tablet Take 1 tablet by mouth twice daily 24   Tiffany Salomon MD        Social History:   Social History     Tobacco Use    Smoking status: Former     Current packs/day: 0.00     Average packs/day: 0.5 packs/day for 15.9 years (7.9 ttl pk-yrs)     Types: Cigarettes     Start date: 3/21/2006     Quit date: 2022     Years since quittin.5    Smokeless tobacco: Never    Tobacco comments:     Will be stopping 22   Vaping Use    Vaping status: Every Day    Substances: Nicotine, Flavoring    Devices: Disposable, Pre-filled pod   Substance Use Topics    Alcohol use: Not Currently     Comment: social    Drug use: Not Currently     Comment: FORMER IN THE PAST          Review of Systems:  Review of Systems   Constitutional:  Negative for chills and fever.   HENT:  Negative for congestion, ear pain and sore throat.    Eyes:  Negative for pain.   Respiratory:  Negative for cough, chest tightness and shortness of breath.    Cardiovascular:  Positive for chest pain.   Gastrointestinal:  Negative for abdominal pain, diarrhea, nausea and vomiting.   Genitourinary:  Negative for flank pain and hematuria.   Musculoskeletal:  Negative for joint swelling.   Skin:  Negative for pallor.   Neurological:  Negative for seizures and headaches.   Psychiatric/Behavioral:  The patient is nervous/anxious.    All other systems reviewed and are negative.       Physical Exam:  /86 (BP Location: Right arm, Patient Position: Lying)   Pulse 94   Temp 98.6 °F (37 °C) (Oral)   Resp 16   Ht 167.6 cm (66\")   Wt 103 kg (227 lb 8.2 oz)   LMP 2024 (Approximate)   SpO2 99%   BMI 36.72 kg/m²     Physical Exam  Vitals and nursing note reviewed.   Constitutional:       General: She is not in acute distress.     Appearance: Normal appearance. She is not " toxic-appearing.   HENT:      Head: Normocephalic and atraumatic.      Jaw: There is normal jaw occlusion.   Eyes:      General: Lids are normal.      Extraocular Movements: Extraocular movements intact.      Conjunctiva/sclera: Conjunctivae normal.      Pupils: Pupils are equal, round, and reactive to light.      Comments: Injected conjunctiva   Cardiovascular:      Rate and Rhythm: Regular rhythm. Tachycardia present.      Pulses: Normal pulses.      Heart sounds: Normal heart sounds.   Pulmonary:      Effort: Pulmonary effort is normal. No respiratory distress.      Breath sounds: Normal breath sounds. No wheezing or rhonchi.   Abdominal:      General: Abdomen is flat.      Palpations: Abdomen is soft.      Tenderness: There is no abdominal tenderness. There is no guarding or rebound.   Musculoskeletal:         General: Normal range of motion.      Cervical back: Normal range of motion and neck supple.      Right lower leg: No edema.      Left lower leg: No edema.      Comments: Left lower extremity in walking brace.  Mild chronic contracture of left upper extremity   Skin:     General: Skin is warm and dry.   Neurological:      Mental Status: She is alert and oriented to person, place, and time. Mental status is at baseline.   Psychiatric:         Mood and Affect: Mood normal.                Procedures:  Procedures      Medical Decision Making:      Comorbidities that affect care:    Depression, hypertension, heart murmur, migraine, history rheumatic fever    External Notes reviewed:    Previous Clinic Note: Outpatient PCP visit March 2024      The following orders were placed and all results were independently analyzed by me:  Orders Placed This Encounter   Procedures    XR Chest 1 View    Odin Draw    High Sensitivity Troponin T    Comprehensive Metabolic Panel    Lipase    BNP    Magnesium    CBC Auto Differential    High Sensitivity Troponin T 2Hr    NPO Diet NPO Type: Strict NPO    Undress & Gown     Continuous Pulse Oximetry    Oxygen Therapy- Nasal Cannula; Titrate 1-6 LPM Per SpO2; 90 - 95%    ECG 12 Lead ED Triage Standing Order; Chest Pain    ECG 12 Lead ED Triage Standing Order; Chest Pain    Insert Peripheral IV    CBC & Differential    Green Top (Gel)    Lavender Top    Gold Top - SST    Light Blue Top       Medications Given in the Emergency Department:  Medications   sodium chloride 0.9 % flush 10 mL (10 mL Intravenous Given 8/5/24 0353)   aspirin chewable tablet 324 mg (324 mg Oral Not Given 8/5/24 0341)   LORazepam (ATIVAN) injection 1 mg (1 mg Intravenous Given 8/5/24 0352)        ED Course:    ED Course as of 08/05/24 0707   Mon Aug 05, 2024   0248 My interpretation of EKG: Sinus tachycardia 102, no acute ischemia [JS]      ED Course User Index  [JS] Ashish Rivera MD       Labs:    Lab Results (last 24 hours)       Procedure Component Value Units Date/Time    High Sensitivity Troponin T [[id number]]  (Normal) Collected: 08/05/24 0304    Specimen: Blood Updated: 08/05/24 0435     HS Troponin T <6 ng/L     Narrative:      High Sensitive Troponin T Reference Range:  <14.0 ng/L- Negative Female for AMI  <22.0 ng/L- Negative Male for AMI  >=14 - Abnormal Female indicating possible myocardial injury.  >=22 - Abnormal Male indicating possible myocardial injury.   Clinicians would have to utilize clinical acumen, EKG, Troponin, and serial changes to determine if it is an Acute Myocardial Infarction or myocardial injury due to an underlying chronic condition.         CBC & Differential [[id number]]  (Normal) Collected: 08/05/24 0304    Specimen: Blood Updated: 08/05/24 0415    Narrative:      The following orders were created for panel order CBC & Differential.  Procedure                               Abnormality         Status                     ---------                               -----------         ------                     CBC Auto Differential[[id number]]        Normal              Final result                  Please view results for these tests on the individual orders.    Comprehensive Metabolic Panel [[id number]]  (Abnormal) Collected: 08/05/24 0304    Specimen: Blood Updated: 08/05/24 0437     Glucose 125 mg/dL      BUN 6 mg/dL      Creatinine 0.72 mg/dL      Sodium 137 mmol/L      Potassium 3.5 mmol/L      Chloride 105 mmol/L      CO2 21.3 mmol/L      Calcium 9.1 mg/dL      Total Protein 7.2 g/dL      Albumin 4.2 g/dL      ALT (SGPT) 19 U/L      AST (SGOT) 19 U/L      Alkaline Phosphatase 86 U/L      Total Bilirubin 0.5 mg/dL      Globulin 3.0 gm/dL      A/G Ratio 1.4 g/dL      BUN/Creatinine Ratio 8.3     Anion Gap 10.7 mmol/L      eGFR 103.9 mL/min/1.73     Narrative:      GFR Normal >60  Chronic Kidney Disease <60  Kidney Failure <15      Lipase [[id number]]  (Normal) Collected: 08/05/24 0304    Specimen: Blood Updated: 08/05/24 0437     Lipase 20 U/L     BNP [[id number]]  (Normal) Collected: 08/05/24 0304    Specimen: Blood Updated: 08/05/24 0433     proBNP 73.6 pg/mL     Narrative:      This assay is used as an aid in the diagnosis of individuals suspected of having heart failure. It can be used as an aid in the diagnosis of acute decompensated heart failure (ADHF) in patients presenting with signs and symptoms of ADHF to the emergency department (ED). In addition, NT-proBNP of <300 pg/mL indicates ADHF is not likely.    Age Range Result Interpretation  NT-proBNP Concentration (pg/mL:      <50             Positive            >450                   Gray                 300-450                    Negative             <300    50-75           Positive            >900                  Gray                300-900                  Negative            <300      >75             Positive            >1800                  Gray                300-1800                  Negative            <300    Magnesium [[id number]]  (Normal) Collected: 08/05/24 0304    Specimen: Blood Updated: 08/05/24 0437     Magnesium 2.1  mg/dL     CBC Auto Differential [[id number]]  (Normal) Collected: 08/05/24 0304    Specimen: Blood Updated: 08/05/24 0415     WBC 5.29 10*3/mm3      RBC 4.81 10*6/mm3      Hemoglobin 13.3 g/dL      Hematocrit 40.5 %      MCV 84.2 fL      MCH 27.7 pg      MCHC 32.8 g/dL      RDW 13.5 %      RDW-SD 41.8 fl      MPV 9.9 fL      Platelets 295 10*3/mm3      Neutrophil % 53.2 %      Lymphocyte % 34.0 %      Monocyte % 9.1 %      Eosinophil % 2.6 %      Basophil % 0.9 %      Immature Grans % 0.2 %      Neutrophils, Absolute 2.81 10*3/mm3      Lymphocytes, Absolute 1.80 10*3/mm3      Monocytes, Absolute 0.48 10*3/mm3      Eosinophils, Absolute 0.14 10*3/mm3      Basophils, Absolute 0.05 10*3/mm3      Immature Grans, Absolute 0.01 10*3/mm3      nRBC 0.0 /100 WBC     High Sensitivity Troponin T 2Hr [[id number]] Collected: 08/05/24 0508    Specimen: Blood from Arm, Right Updated: 08/05/24 0533     HS Troponin T <6 ng/L      Troponin T Delta --     Comment: Unable to calculate.       Narrative:      High Sensitive Troponin T Reference Range:  <14.0 ng/L- Negative Female for AMI  <22.0 ng/L- Negative Male for AMI  >=14 - Abnormal Female indicating possible myocardial injury.  >=22 - Abnormal Male indicating possible myocardial injury.   Clinicians would have to utilize clinical acumen, EKG, Troponin, and serial changes to determine if it is an Acute Myocardial Infarction or myocardial injury due to an underlying chronic condition.                  Imaging:    XR Chest 1 View    Result Date: 8/5/2024  XR CHEST 1 VW Date of Exam: 8/5/2024 3:09 AM EDT Indication: Chest Pain Triage Protocol Comparison: 7/2/2021 Findings: Cardiac and mediastinal contours are normal. The lungs are clear. Pulmonary vascularity is normal. No pneumothorax.     No acute cardiopulmonary findings. Electronically Signed: Kenroy Hunt MD  8/5/2024 3:10 AM EDT  Workstation ID: [id number]       Differential Diagnosis and Discussion:    Chest Pain:  Based on the  patient's signs and symptoms, I considered aortic dissection, myocardial infaction, pulmonary embolism, cardiac tamponade, pericarditis, pneumothorax, musculoskeletal chest pain and other differential diagnosis as an etiology of the patient's chest pain.     All labs were reviewed and interpreted by me.  All X-rays impressions were independently interpreted by me.  EKG was interpreted by me.    MDM               Patient Care Considerations:    NARCOTICS: I considered prescribing opiate pain medication as an outpatient, however no narcotic pain control required.      Consultants/Shared Management Plan:    None    Social Determinants of Health:    Patient is independent, reliable, and has access to care.       Disposition and Care Coordination:    Discharged: The patient is suitable and stable for discharge with no need for consideration of admission.    I have explained the patient´s condition, diagnoses and treatment plan based on the information available to me at this time. I have answered questions and addressed any concerns. The patient has a good  understanding of the patient´s diagnosis, condition, and treatment plan as can be expected at this point. The vital signs have been stable. The patient´s condition is stable and appropriate for discharge from the emergency department.      The patient will pursue further outpatient evaluation with the primary care physician or other designated or consulting physician as outlined in the discharge instructions. They are agreeable to this plan of care and follow-up instructions have been explained in detail. The patient has received these instructions in written format and has expressed an understanding of the discharge instructions. The patient is aware that any significant change in condition or worsening of symptoms should prompt an immediate return to this or the closest emergency department or call to 911.    Final diagnoses:   Cocaine use   Chest pain, unspecified  type        ED Disposition       ED Disposition   Discharge    Condition   Stable    Comment   --               This medical record created using voice recognition software.             Ashish Rivera MD  08/05/24 0759

## 2024-08-06 LAB
QT INTERVAL: 348 MS
QT INTERVAL: 350 MS
QTC INTERVAL: 431 MS
QTC INTERVAL: 454 MS

## 2024-08-22 RX ORDER — BACLOFEN 20 MG/1
20 TABLET ORAL 3 TIMES DAILY
Qty: 90 TABLET | Refills: 0 | Status: SHIPPED | OUTPATIENT
Start: 2024-08-22

## 2024-10-03 ENCOUNTER — APPOINTMENT (OUTPATIENT)
Dept: GENERAL RADIOLOGY | Facility: HOSPITAL | Age: 49
End: 2024-10-03
Payer: MEDICAID

## 2024-10-03 ENCOUNTER — HOSPITAL ENCOUNTER (EMERGENCY)
Facility: HOSPITAL | Age: 49
Discharge: HOME | End: 2024-10-04
Attending: EMERGENCY MEDICINE
Payer: MEDICAID

## 2024-10-03 DIAGNOSIS — T40.5X5A ADVERSE EFFECT OF COCAINE, INITIAL ENCOUNTER: ICD-10-CM

## 2024-10-03 DIAGNOSIS — F41.1 ANXIETY REACTION: Primary | ICD-10-CM

## 2024-10-03 LAB
ALBUMIN SERPL-MCNC: 4.1 G/DL (ref 3.5–5.2)
ALBUMIN/GLOB SERPL: 1.2 G/DL
ALP SERPL-CCNC: 103 U/L (ref 39–117)
ALT SERPL W P-5'-P-CCNC: 26 U/L (ref 1–33)
ANION GAP SERPL CALCULATED.3IONS-SCNC: 11.6 MMOL/L (ref 5–15)
AST SERPL-CCNC: 16 U/L (ref 1–32)
BASOPHILS # BLD AUTO: 0.06 10*3/MM3 (ref 0–0.2)
BASOPHILS NFR BLD AUTO: 0.9 % (ref 0–1.5)
BILIRUB SERPL-MCNC: 0.4 MG/DL (ref 0–1.2)
BUN SERPL-MCNC: 5 MG/DL (ref 6–20)
BUN/CREAT SERPL: 6.5 (ref 7–25)
CALCIUM SPEC-SCNC: 9.4 MG/DL (ref 8.6–10.5)
CHLORIDE SERPL-SCNC: 104 MMOL/L (ref 98–107)
CO2 SERPL-SCNC: 22.4 MMOL/L (ref 22–29)
CREAT SERPL-MCNC: 0.77 MG/DL (ref 0.57–1)
D DIMER PPP FEU-MCNC: <0.27 MCGFEU/ML (ref 0–0.5)
DEPRECATED RDW RBC AUTO: 39.2 FL (ref 37–54)
EGFRCR SERPLBLD CKD-EPI 2021: 95.9 ML/MIN/1.73
EOSINOPHIL # BLD AUTO: 0.07 10*3/MM3 (ref 0–0.4)
EOSINOPHIL NFR BLD AUTO: 1.1 % (ref 0.3–6.2)
ERYTHROCYTE [DISTWIDTH] IN BLOOD BY AUTOMATED COUNT: 12.8 % (ref 12.3–15.4)
GLOBULIN UR ELPH-MCNC: 3.4 GM/DL
GLUCOSE SERPL-MCNC: 203 MG/DL (ref 65–99)
HCT VFR BLD AUTO: 42.5 % (ref 34–46.6)
HGB BLD-MCNC: 13.9 G/DL (ref 12–15.9)
HOLD SPECIMEN: NORMAL
HOLD SPECIMEN: NORMAL
IMM GRANULOCYTES # BLD AUTO: 0.01 10*3/MM3 (ref 0–0.05)
IMM GRANULOCYTES NFR BLD AUTO: 0.2 % (ref 0–0.5)
LIPASE SERPL-CCNC: 32 U/L (ref 13–60)
LYMPHOCYTES # BLD AUTO: 2 10*3/MM3 (ref 0.7–3.1)
LYMPHOCYTES NFR BLD AUTO: 30.6 % (ref 19.6–45.3)
MAGNESIUM SERPL-MCNC: 2 MG/DL (ref 1.6–2.6)
MCH RBC QN AUTO: 27.5 PG (ref 26.6–33)
MCHC RBC AUTO-ENTMCNC: 32.7 G/DL (ref 31.5–35.7)
MCV RBC AUTO: 84 FL (ref 79–97)
MONOCYTES # BLD AUTO: 0.47 10*3/MM3 (ref 0.1–0.9)
MONOCYTES NFR BLD AUTO: 7.2 % (ref 5–12)
NEUTROPHILS NFR BLD AUTO: 3.92 10*3/MM3 (ref 1.7–7)
NEUTROPHILS NFR BLD AUTO: 60 % (ref 42.7–76)
NRBC BLD AUTO-RTO: 0 /100 WBC (ref 0–0.2)
NT-PROBNP SERPL-MCNC: <36 PG/ML (ref 0–450)
PLATELET # BLD AUTO: 310 10*3/MM3 (ref 140–450)
PMV BLD AUTO: 10.4 FL (ref 6–12)
POTASSIUM SERPL-SCNC: 4.1 MMOL/L (ref 3.5–5.2)
PROT SERPL-MCNC: 7.5 G/DL (ref 6–8.5)
QT INTERVAL: 322 MS
QTC INTERVAL: 454 MS
RBC # BLD AUTO: 5.06 10*6/MM3 (ref 3.77–5.28)
SODIUM SERPL-SCNC: 138 MMOL/L (ref 136–145)
TROPONIN T SERPL HS-MCNC: <6 NG/L
WBC NRBC COR # BLD AUTO: 6.53 10*3/MM3 (ref 3.4–10.8)
WHOLE BLOOD HOLD COAG: NORMAL
WHOLE BLOOD HOLD SPECIMEN: NORMAL

## 2024-10-03 PROCEDURE — 84484 ASSAY OF TROPONIN QUANT: CPT | Performed by: EMERGENCY MEDICINE

## 2024-10-03 PROCEDURE — 83690 ASSAY OF LIPASE: CPT

## 2024-10-03 PROCEDURE — 80053 COMPREHEN METABOLIC PANEL: CPT

## 2024-10-03 PROCEDURE — 85025 COMPLETE CBC W/AUTO DIFF WBC: CPT

## 2024-10-03 PROCEDURE — 93005 ELECTROCARDIOGRAM TRACING: CPT

## 2024-10-03 PROCEDURE — 93005 ELECTROCARDIOGRAM TRACING: CPT | Performed by: EMERGENCY MEDICINE

## 2024-10-03 PROCEDURE — 85379 FIBRIN DEGRADATION QUANT: CPT | Performed by: EMERGENCY MEDICINE

## 2024-10-03 PROCEDURE — 84484 ASSAY OF TROPONIN QUANT: CPT

## 2024-10-03 PROCEDURE — 36415 COLL VENOUS BLD VENIPUNCTURE: CPT

## 2024-10-03 PROCEDURE — 71045 X-RAY EXAM CHEST 1 VIEW: CPT

## 2024-10-03 PROCEDURE — 83880 ASSAY OF NATRIURETIC PEPTIDE: CPT

## 2024-10-03 PROCEDURE — 83735 ASSAY OF MAGNESIUM: CPT

## 2024-10-03 PROCEDURE — 99284 EMERGENCY DEPT VISIT MOD MDM: CPT

## 2024-10-03 RX ORDER — SODIUM CHLORIDE 0.9 % (FLUSH) 0.9 %
10 SYRINGE (ML) INJECTION AS NEEDED
Status: DISCONTINUED | OUTPATIENT
Start: 2024-10-03 | End: 2024-10-04 | Stop reason: HOSPADM

## 2024-10-03 RX ORDER — ASPIRIN 81 MG/1
324 TABLET, CHEWABLE ORAL ONCE
Status: COMPLETED | OUTPATIENT
Start: 2024-10-03 | End: 2024-10-03

## 2024-10-03 RX ORDER — LORAZEPAM 0.5 MG/1
1 TABLET ORAL ONCE
Status: COMPLETED | OUTPATIENT
Start: 2024-10-03 | End: 2024-10-03

## 2024-10-03 RX ADMIN — ASPIRIN 324 MG: 81 TABLET, CHEWABLE ORAL at 21:40

## 2024-10-03 RX ADMIN — LORAZEPAM 1 MG: 0.5 TABLET ORAL at 21:40

## 2024-10-03 NOTE — ED PROVIDER NOTES
Time: 7:38 PM EDT  Date of encounter:  10/3/2024  Independent Historian/Clinical History and Information was obtained by:   Patient    History is limited by: N/A    Chief Complaint   Patient presents with    Chest Pain    Shortness of Breath         History of Present Illness:  Patient is a 49 y.o. year old female who presents to the emergency department for evaluation of chest pain.  Patient began having chest pain shortness of breath approximately 1 hour ago that is midsternal and radiates towards the right side.  (Provider in triage, Elmo Benavides PA-C)    Patient states that she feels as though the pain is related to anxiety.  States it is moving around her anterior chest and started approximately 4 hours ago while she was at rest.  States she was fine while at work earlier today.  She does report that she used cocaine early in the morning but states she was fine afterwards until recently.  She states this is similar to previous episodes of chest pain.  She denies any leg pain or pedal edema.    Patient Care Team  Primary Care Provider: Tiffany Salomon MD    Past Medical History:     No Known Allergies  Past Medical History:   Diagnosis Date    Abnormal Pap smear of cervix     Allergic 2000    unsure of month/date    Allergic rhinitis     Anxiety     Contracted ligament     LUE and LLE    Contracture of muscle 04/18/2016    [facility], WILL CONT BACLOFEN AND BOTOX    Cramp in muscle     Depression     Heart murmur     High blood pressure     Hypertension     Meningitis 01/08/2016    THIS OCCURED AS A CHILD, BUT SHE HAS A SIGNIFICANT AMOUNT OF CONTRACTURES ON HER LEFT SIDE WILL SCHEDULE PHYSICAL THERAPY REFER TO PM&R START BACLOFEN     Migraine headache     Murmur, heart     Recurrent major depressive disorder, in full remission 09/11/2017    DOING WELL ON TRINTELLIX SAYS IT KEEPS HER SMILING :)     Rheumatic fever     Syphilis     Tendinitis of hip, left 10/05/2017    Trochanteric bursitis of left hip  10/05/2017     Past Surgical History:   Procedure Laterality Date    OTHER SURGICAL HISTORY Left 1995    BROKEN BONES , LEFT ARM.  1988 & 1995     OTHER SURGICAL HISTORY      METAL IMPLANTS     ULNAR COLLATERAL LIGAMENT REPAIR      ligaments released left arm    WRIST SURGERY  1995    metal plate left wrist     Family History   Problem Relation Age of Onset    Prostate cancer Father     COPD Father         COPD    Diabetes type II Mother     Anxiety disorder Mother         anxiety    Depression Mother     Drug abuse Mother     Hyperlipidemia Mother     Miscarriages / Stillbirths Mother         miscarriage    Anxiety disorder Son         Not diagnosed but show signs/anxiety    Uterine cancer Maternal Aunt 30        to clarify if uterine or cervical    Stroke Other     Heart disease Other     Breast cancer Neg Hx     Colon cancer Neg Hx     Deep vein thrombosis Neg Hx     Pulmonary embolism Neg Hx        Home Medications:  Prior to Admission medications    Medication Sig Start Date End Date Taking? Authorizing Provider   baclofen (LIORESAL) 20 MG tablet TAKE 1 TABLET BY MOUTH THREE TIMES DAILY 8/22/24   Tiffany Salomon MD   busPIRone (BUSPAR) 15 MG tablet Take 1 tablet by mouth 3 (Three) Times a Day. 7/11/24   Tiffany Salomon MD   desvenlafaxine (PRISTIQ) 50 MG 24 hr tablet Take 1 tablet by mouth every night at bedtime. 6/9/24   ProviderCarlos MD   escitalopram (Lexapro) 5 MG tablet Take 1 tablet by mouth Daily. 5/1/24   Tiffany Salomon MD   fluticasone (FLONASE) 50 MCG/ACT nasal spray 2 sprays by Each Nare route Daily. 3/13/24   Tiffany Salomon MD   lisinopril (PRINIVIL,ZESTRIL) 20 MG tablet Take 1 tablet by mouth Daily. New medication for patient. 5/1/24   Tiffany Salomon MD   loratadine (Claritin) 10 MG tablet Take 1 tablet by mouth Daily for 30 days. 4/23/24 5/23/24  James Ventura MD   montelukast (SINGULAIR) 10 MG tablet Take 1 tablet by mouth Daily. 12/19/23   Tiffany Salomon MD  "  naproxen (NAPROSYN) 500 MG tablet TAKE 1 TABLET BY MOUTH TWICE DAILY WITH MEALS 24   Tiffany Salomon MD   olopatadine (PATANOL) 0.1 % ophthalmic solution Administer 1 drop to both eyes 2 (Two) Times a Day As Needed for Allergies.  Patient not taking: Reported on 2024   James Ventura MD   Rexulti 3 MG tablet Take 1 tablet by mouth Daily.  Patient not taking: Reported on 2024 3/13/24   Tiffany Salomon MD   topiramate (TOPAMAX) 50 MG tablet Take 1 tablet by mouth twice daily 24   Tiffany Salomon MD        Social History:   Social History     Tobacco Use    Smoking status: Former     Current packs/day: 0.00     Average packs/day: 0.5 packs/day for 15.9 years (7.9 ttl pk-yrs)     Types: Cigarettes     Start date: 3/21/2006     Quit date: 2022     Years since quittin.6    Smokeless tobacco: Never    Tobacco comments:     Will be stopping 2..   Vaping Use    Vaping status: Every Day    Substances: Nicotine, Flavoring    Devices: Disposable, Pre-filled pod   Substance Use Topics    Alcohol use: Not Currently     Comment: social    Drug use: Not Currently     Comment: FORMER IN THE PAST          Review of Systems:  Review of Systems   Constitutional:  Negative for chills and fever.   HENT:  Negative for congestion, ear pain and sore throat.    Eyes:  Negative for pain.   Respiratory:  Positive for shortness of breath. Negative for cough and chest tightness.    Cardiovascular:  Positive for chest pain.   Gastrointestinal:  Negative for abdominal pain, diarrhea, nausea and vomiting.   Genitourinary:  Negative for flank pain and hematuria.   Musculoskeletal:  Negative for joint swelling.   Skin:  Negative for pallor.   Neurological:  Negative for seizures and headaches.   All other systems reviewed and are negative.       Physical Exam:  /97   Pulse 97   Temp 97.8 °F (36.6 °C) (Oral)   Resp 16   Ht 167.6 cm (66\")   Wt 101 kg (222 lb 7.1 oz)   SpO2 96%   BMI 35.90 " kg/m²         Physical Exam  Vitals and nursing note reviewed.   Constitutional:       General: She is not in acute distress.     Appearance: Normal appearance. She is not toxic-appearing.   HENT:      Head: Normocephalic and atraumatic.      Jaw: There is normal jaw occlusion.      Mouth/Throat:      Mouth: Mucous membranes are moist.   Eyes:      General: Lids are normal.      Extraocular Movements: Extraocular movements intact.      Conjunctiva/sclera: Conjunctivae normal.      Pupils: Pupils are equal, round, and reactive to light.   Cardiovascular:      Rate and Rhythm: Regular rhythm. Tachycardia present.      Pulses: Normal pulses.      Heart sounds: Normal heart sounds.   Pulmonary:      Effort: Pulmonary effort is normal. No respiratory distress.      Breath sounds: Normal breath sounds. No wheezing or rhonchi.   Abdominal:      General: Abdomen is flat. There is no distension.      Palpations: Abdomen is soft.      Tenderness: There is no abdominal tenderness. There is no guarding or rebound.   Musculoskeletal:         General: Normal range of motion.      Cervical back: Normal range of motion and neck supple.      Right lower leg: No tenderness. No edema.      Left lower leg: No tenderness. No edema.   Skin:     General: Skin is warm and dry.   Neurological:      General: No focal deficit present.      Mental Status: She is alert and oriented to person, place, and time. Mental status is at baseline.   Psychiatric:         Mood and Affect: Mood normal.         Behavior: Behavior normal.                  Procedures:  Procedures      Medical Decision Making:      Comorbidities that affect care:    Migraine, rheumatic fever, hypertension, anxiety, depression, history of cocaine use    External Notes reviewed:    Previous Clinic Note: PCP visit for 3/13/2024      The following orders were placed and all results were independently analyzed by me:  Orders Placed This Encounter   Procedures    XR Chest 1 View     [place] Draw    High Sensitivity Troponin T    Comprehensive Metabolic Panel    Lipase    BNP    Magnesium    CBC Auto Differential    High Sensitivity Troponin T 2Hr    D-dimer, Quantitative    CBC & Differential    Green Top (Gel)    Lavender Top    Gold Top - SST    Light Blue Top       Medications Given in the Emergency Department:  Medications   aspirin chewable tablet 324 mg (324 mg Oral Given 10/3/24 2140)   LORazepam (ATIVAN) tablet 1 mg (1 mg Oral Given 10/3/24 2140)        ED Course:    The patient was initially evaluated in the triage area where orders were placed. The patient was later dispositioned by Ashish Rivera MD.      The patient was advised to stay for completion of workup which includes but is not limited to communication of labs and radiological results, reassessment and plan. The patient was advised that leaving prior to disposition by a provider could result in critical findings that are not communicated to the patient.     ED Course as of 10/04/24 0711   u Oct 03, 2024   1930 PROVIDER IN TRIAGE  Patient was evaluated by me in triage, Elmo Benavides PA-C.  Orders were placed and patient is currently awaiting final results and disposition.  [MD]   2111 My interpretation of EKG: Sinus tachycardia 119, no acute ischemia [JS]      ED Course User Index  [JS] Ashish Rivera MD  [MD] Elmo Benavides PA-C       Labs:    Lab Results (last 24 hours)       Procedure Component Value Units Date/Time    High Sensitivity Troponin T [[id number]]  (Normal) Collected: 10/03/24 1942    Specimen: Blood from Arm, Right Updated: 10/03/24 2014     HS Troponin T <6 ng/L     Narrative:      High Sensitive Troponin T Reference Range:  <14.0 ng/L- Negative Female for AMI  <22.0 ng/L- Negative Male for AMI  >=14 - Abnormal Female indicating possible myocardial injury.  >=22 - Abnormal Male indicating possible myocardial injury.   Clinicians would have to utilize clinical acumen, EKG, Troponin, and serial changes to  determine if it is an Acute Myocardial Infarction or myocardial injury due to an underlying chronic condition.         CBC & Differential [[id number]]  (Normal) Collected: 10/03/24 1942    Specimen: Blood from Arm, Right Updated: 10/03/24 1953    Narrative:      The following orders were created for panel order CBC & Differential.  Procedure                               Abnormality         Status                     ---------                               -----------         ------                     CBC Auto Differential[[id number]]        Normal              Final result                 Please view results for these tests on the individual orders.    Comprehensive Metabolic Panel [[id number]]  (Abnormal) Collected: 10/03/24 1942    Specimen: Blood from Arm, Right Updated: 10/03/24 2014     Glucose 203 mg/dL      BUN 5 mg/dL      Creatinine 0.77 mg/dL      Sodium 138 mmol/L      Potassium 4.1 mmol/L      Chloride 104 mmol/L      CO2 22.4 mmol/L      Calcium 9.4 mg/dL      Total Protein 7.5 g/dL      Albumin 4.1 g/dL      ALT (SGPT) 26 U/L      AST (SGOT) 16 U/L      Alkaline Phosphatase 103 U/L      Total Bilirubin 0.4 mg/dL      Globulin 3.4 gm/dL      A/G Ratio 1.2 g/dL      BUN/Creatinine Ratio 6.5     Anion Gap 11.6 mmol/L      eGFR 95.9 mL/min/1.73     Narrative:      GFR Normal >60  Chronic Kidney Disease <60  Kidney Failure <15      Lipase [[id number]]  (Normal) Collected: 10/03/24 1942    Specimen: Blood from Arm, Right Updated: 10/03/24 2014     Lipase 32 U/L     BNP [[id number]]  (Normal) Collected: 10/03/24 1942    Specimen: Blood from Arm, Right Updated: 10/03/24 2010     proBNP <36.0 pg/mL     Narrative:      This assay is used as an aid in the diagnosis of individuals suspected of having heart failure. It can be used as an aid in the diagnosis of acute decompensated heart failure (ADHF) in patients presenting with signs and symptoms of ADHF to the emergency department (ED). In addition, NT-proBNP of <300  pg/mL indicates ADHF is not likely.    Age Range Result Interpretation  NT-proBNP Concentration (pg/mL:      <50             Positive            >450                   Gray                 300-450                    Negative             <300    50-75           Positive            >900                  Gray                300-900                  Negative            <300      >75             Positive            >1800                  Gray                300-1800                  Negative            <300    Magnesium [[id number]]  (Normal) Collected: 10/03/24 1942    Specimen: Blood from Arm, Right Updated: 10/03/24 2014     Magnesium 2.0 mg/dL     CBC Auto Differential [[id number]]  (Normal) Collected: 10/03/24 1942    Specimen: Blood from Arm, Right Updated: 10/03/24 1953     WBC 6.53 10*3/mm3      RBC 5.06 10*6/mm3      Hemoglobin 13.9 g/dL      Hematocrit 42.5 %      MCV 84.0 fL      MCH 27.5 pg      MCHC 32.7 g/dL      RDW 12.8 %      RDW-SD 39.2 fl      MPV 10.4 fL      Platelets 310 10*3/mm3      Neutrophil % 60.0 %      Lymphocyte % 30.6 %      Monocyte % 7.2 %      Eosinophil % 1.1 %      Basophil % 0.9 %      Immature Grans % 0.2 %      Neutrophils, Absolute 3.92 10*3/mm3      Lymphocytes, Absolute 2.00 10*3/mm3      Monocytes, Absolute 0.47 10*3/mm3      Eosinophils, Absolute 0.07 10*3/mm3      Basophils, Absolute 0.06 10*3/mm3      Immature Grans, Absolute 0.01 10*3/mm3      nRBC 0.0 /100 WBC     D-dimer, Quantitative [[id number]]  (Normal) Collected: 10/03/24 1942    Specimen: Blood from Arm, Right Updated: 10/03/24 2321     D-Dimer, Quantitative <0.27 MCGFEU/mL     Narrative:      According to the assay 's published package insert, a normal (<0.50 MCGFEU/mL) D-dimer result in conjunction with a non-high clinical probability assessment, excludes deep vein thrombosis (DVT) and pulmonary embolism (PE) with high sensitivity.    D-dimer values increase with age and this can make VTE exclusion of an  "older population difficult. To address this, the [employer], based on best available evidence and recent guidelines, recommends that clinicians use age-adjusted D-dimer thresholds in patients greater than 50 years of age with: a) a low probability of PE who do not meet all Pulmonary Embolism Rule Out Criteria, or b) in those with intermediate probability of PE.   The formula for an age-adjusted D-dimer cut-off is \"age/100\".  For example, a 60 year old patient would have an age-adjusted cut-off of 0.60 MCGFEU/mL and an 80 year old 0.80 MCGFEU/mL.    High Sensitivity Troponin T 2Hr [[id number]] Collected: 10/03/24 4050    Specimen: Blood from Arm, Right Updated: 10/04/24 0016     HS Troponin T <6 ng/L      Troponin T Delta --     Comment: Unable to calculate.       Narrative:      High Sensitive Troponin T Reference Range:  <14.0 ng/L- Negative Female for AMI  <22.0 ng/L- Negative Male for AMI  >=14 - Abnormal Female indicating possible myocardial injury.  >=22 - Abnormal Male indicating possible myocardial injury.   Clinicians would have to utilize clinical acumen, EKG, Troponin, and serial changes to determine if it is an Acute Myocardial Infarction or myocardial injury due to an underlying chronic condition.                  Imaging:    XR Chest 1 View    Result Date: 10/3/2024  XR CHEST 1 VW Date of Exam: 10/3/2024 7:47 PM EDT Indication: Chest Pain Triage Protocol Comparison: 8/5/2024 Findings: There is evidence for possible developing infiltrates within the bilateral lung bases, more pronounced in the left lower lobe. The cardiac silhouette and mediastinum are stable. No acute osseous abnormalities are seen.     Impression: Evidence for potential developing bibasilar infiltrates, more pronounced in the left lower lobe. The findings may indicate developing bibasilar pneumonia. Mild pulmonary edema could also be considered. Electronically Signed: Jean Tarango MD  10/3/2024 8:00 PM EDT  " Workstation ID: [id number]       Differential Diagnosis and Discussion:      Chest Pain:  Based on the patient's signs and symptoms, I considered aortic dissection, myocardial infaction, pulmonary embolism, cardiac tamponade, pericarditis, pneumothorax, musculoskeletal chest pain and other differential diagnosis as an etiology of the patient's chest pain.     All labs were reviewed and interpreted by me.  All X-rays impressions were independently interpreted by me.  EKG was interpreted by me.    [facility]                   Patient Care Considerations:    NARCOTICS: I considered prescribing opiate pain medication as an outpatient, however no narcotic pain control required in the ED.      Consultants/Shared Management Plan:    None    Social Determinants of Health:    Patient is independent, reliable, and has access to care.       Disposition and Care Coordination:    Discharged: The patient is suitable and stable for discharge with no need for consideration of admission.    I have explained the patient´s condition, diagnoses and treatment plan based on the information available to me at this time. I have answered questions and addressed any concerns. The patient has a good  understanding of the patient´s diagnosis, condition, and treatment plan as can be expected at this point. The vital signs have been stable. The patient´s condition is stable and appropriate for discharge from the emergency department.      The patient will pursue further outpatient evaluation with the primary care physician or other designated or consulting physician as outlined in the discharge instructions. They are agreeable to this plan of care and follow-up instructions have been explained in detail. The patient has received these instructions in written format and has expressed an understanding of the discharge instructions. The patient is aware that any significant change in condition or worsening of symptoms should prompt an immediate return to  this or the closest emergency department or call to 911.    Final diagnoses:   Anxiety reaction   Adverse effect of cocaine, initial encounter        ED Disposition       ED Disposition   Discharge    Condition   Stable    Comment   --               This medical record created using voice recognition software.             Ashish Rivera MD  10/04/24 0711

## 2024-10-04 VITALS
SYSTOLIC BLOOD PRESSURE: 146 MMHG | RESPIRATION RATE: 16 BRPM | WEIGHT: 222.44 LBS | TEMPERATURE: 97.8 F | DIASTOLIC BLOOD PRESSURE: 97 MMHG | HEIGHT: 66 IN | OXYGEN SATURATION: 96 % | HEART RATE: 97 BPM | BODY MASS INDEX: 35.75 KG/M2

## 2024-10-04 LAB
GEN 5 2HR TROPONIN T REFLEX: <6 NG/L
TROPONIN T DELTA: NORMAL

## 2024-10-27 ENCOUNTER — APPOINTMENT (OUTPATIENT)
Dept: GENERAL RADIOLOGY | Facility: HOSPITAL | Age: 49
End: 2024-10-27
Payer: MEDICAID

## 2024-10-27 ENCOUNTER — HOSPITAL ENCOUNTER (EMERGENCY)
Facility: HOSPITAL | Age: 49
Discharge: HOME | End: 2024-10-27
Attending: EMERGENCY MEDICINE | Admitting: EMERGENCY MEDICINE
Payer: MEDICAID

## 2024-10-27 VITALS
DIASTOLIC BLOOD PRESSURE: 91 MMHG | HEART RATE: 89 BPM | OXYGEN SATURATION: 96 % | TEMPERATURE: 98.7 F | SYSTOLIC BLOOD PRESSURE: 128 MMHG | BODY MASS INDEX: 32.98 KG/M2 | WEIGHT: 222.66 LBS | RESPIRATION RATE: 18 BRPM | HEIGHT: 69 IN

## 2024-10-27 DIAGNOSIS — R07.9 CHEST PAIN, UNSPECIFIED TYPE: Primary | ICD-10-CM

## 2024-10-27 LAB
ALBUMIN SERPL-MCNC: 3.9 G/DL (ref 3.5–5.2)
ALBUMIN/GLOB SERPL: 1.3 G/DL
ALP SERPL-CCNC: 90 U/L (ref 39–117)
ALT SERPL W P-5'-P-CCNC: 24 U/L (ref 1–33)
ANION GAP SERPL CALCULATED.3IONS-SCNC: 10.1 MMOL/L (ref 5–15)
AST SERPL-CCNC: 18 U/L (ref 1–32)
BASOPHILS # BLD AUTO: 0.07 10*3/MM3 (ref 0–0.2)
BASOPHILS NFR BLD AUTO: 1.3 % (ref 0–1.5)
BILIRUB SERPL-MCNC: 0.4 MG/DL (ref 0–1.2)
BUN SERPL-MCNC: 5 MG/DL (ref 6–20)
BUN/CREAT SERPL: 6.9 (ref 7–25)
CALCIUM SPEC-SCNC: 9.2 MG/DL (ref 8.6–10.5)
CHLORIDE SERPL-SCNC: 103 MMOL/L (ref 98–107)
CO2 SERPL-SCNC: 23.9 MMOL/L (ref 22–29)
CREAT SERPL-MCNC: 0.72 MG/DL (ref 0.57–1)
DEPRECATED RDW RBC AUTO: 41 FL (ref 37–54)
EGFRCR SERPLBLD CKD-EPI 2021: 103.9 ML/MIN/1.73
EOSINOPHIL # BLD AUTO: 0.16 10*3/MM3 (ref 0–0.4)
EOSINOPHIL NFR BLD AUTO: 2.9 % (ref 0.3–6.2)
ERYTHROCYTE [DISTWIDTH] IN BLOOD BY AUTOMATED COUNT: 13.2 % (ref 12.3–15.4)
GEN 5 2HR TROPONIN T REFLEX: <6 NG/L
GLOBULIN UR ELPH-MCNC: 3 GM/DL
GLUCOSE SERPL-MCNC: 114 MG/DL (ref 65–99)
HCT VFR BLD AUTO: 40.5 % (ref 34–46.6)
HGB BLD-MCNC: 13.3 G/DL (ref 12–15.9)
HOLD SPECIMEN: NORMAL
HOLD SPECIMEN: NORMAL
IMM GRANULOCYTES # BLD AUTO: 0.01 10*3/MM3 (ref 0–0.05)
IMM GRANULOCYTES NFR BLD AUTO: 0.2 % (ref 0–0.5)
LIPASE SERPL-CCNC: 25 U/L (ref 13–60)
LYMPHOCYTES # BLD AUTO: 1.87 10*3/MM3 (ref 0.7–3.1)
LYMPHOCYTES NFR BLD AUTO: 34.4 % (ref 19.6–45.3)
MAGNESIUM SERPL-MCNC: 1.9 MG/DL (ref 1.6–2.6)
MCH RBC QN AUTO: 27.9 PG (ref 26.6–33)
MCHC RBC AUTO-ENTMCNC: 32.8 G/DL (ref 31.5–35.7)
MCV RBC AUTO: 84.9 FL (ref 79–97)
MONOCYTES # BLD AUTO: 0.56 10*3/MM3 (ref 0.1–0.9)
MONOCYTES NFR BLD AUTO: 10.3 % (ref 5–12)
NEUTROPHILS NFR BLD AUTO: 2.76 10*3/MM3 (ref 1.7–7)
NEUTROPHILS NFR BLD AUTO: 50.9 % (ref 42.7–76)
NRBC BLD AUTO-RTO: 0 /100 WBC (ref 0–0.2)
NT-PROBNP SERPL-MCNC: <36 PG/ML (ref 0–450)
PLATELET # BLD AUTO: 303 10*3/MM3 (ref 140–450)
PMV BLD AUTO: 10.4 FL (ref 6–12)
POTASSIUM SERPL-SCNC: 3.7 MMOL/L (ref 3.5–5.2)
PROT SERPL-MCNC: 6.9 G/DL (ref 6–8.5)
QT INTERVAL: 322 MS
QTC INTERVAL: 454 MS
RBC # BLD AUTO: 4.77 10*6/MM3 (ref 3.77–5.28)
SODIUM SERPL-SCNC: 137 MMOL/L (ref 136–145)
TROPONIN T DELTA: NORMAL
TROPONIN T SERPL HS-MCNC: <6 NG/L
WBC NRBC COR # BLD AUTO: 5.43 10*3/MM3 (ref 3.4–10.8)
WHOLE BLOOD HOLD COAG: NORMAL
WHOLE BLOOD HOLD SPECIMEN: NORMAL

## 2024-10-27 PROCEDURE — 83690 ASSAY OF LIPASE: CPT | Performed by: EMERGENCY MEDICINE

## 2024-10-27 PROCEDURE — 96372 THER/PROPH/DIAG INJ SC/IM: CPT

## 2024-10-27 PROCEDURE — 25010000002 KETOROLAC TROMETHAMINE PER 15 MG: Performed by: EMERGENCY MEDICINE

## 2024-10-27 PROCEDURE — 36415 COLL VENOUS BLD VENIPUNCTURE: CPT | Performed by: EMERGENCY MEDICINE

## 2024-10-27 PROCEDURE — 99284 EMERGENCY DEPT VISIT MOD MDM: CPT

## 2024-10-27 PROCEDURE — 71045 X-RAY EXAM CHEST 1 VIEW: CPT

## 2024-10-27 PROCEDURE — 93005 ELECTROCARDIOGRAM TRACING: CPT

## 2024-10-27 PROCEDURE — 83880 ASSAY OF NATRIURETIC PEPTIDE: CPT | Performed by: EMERGENCY MEDICINE

## 2024-10-27 PROCEDURE — 84484 ASSAY OF TROPONIN QUANT: CPT | Performed by: EMERGENCY MEDICINE

## 2024-10-27 PROCEDURE — 83735 ASSAY OF MAGNESIUM: CPT | Performed by: EMERGENCY MEDICINE

## 2024-10-27 PROCEDURE — 85025 COMPLETE CBC W/AUTO DIFF WBC: CPT | Performed by: EMERGENCY MEDICINE

## 2024-10-27 PROCEDURE — 93005 ELECTROCARDIOGRAM TRACING: CPT | Performed by: EMERGENCY MEDICINE

## 2024-10-27 PROCEDURE — 80053 COMPREHEN METABOLIC PANEL: CPT | Performed by: EMERGENCY MEDICINE

## 2024-10-27 RX ORDER — KETOROLAC TROMETHAMINE 30 MG/ML
60 INJECTION, SOLUTION INTRAMUSCULAR; INTRAVENOUS ONCE
Status: COMPLETED | OUTPATIENT
Start: 2024-10-27 | End: 2024-10-27

## 2024-10-27 RX ORDER — ASPIRIN 81 MG/1
324 TABLET, CHEWABLE ORAL ONCE
Status: COMPLETED | OUTPATIENT
Start: 2024-10-27 | End: 2024-10-27

## 2024-10-27 RX ORDER — SODIUM CHLORIDE 0.9 % (FLUSH) 0.9 %
10 SYRINGE (ML) INJECTION AS NEEDED
Status: DISCONTINUED | OUTPATIENT
Start: 2024-10-27 | End: 2024-10-27 | Stop reason: HOSPADM

## 2024-10-27 RX ADMIN — KETOROLAC TROMETHAMINE 60 MG: 30 INJECTION, SOLUTION INTRAMUSCULAR at 14:50

## 2024-10-27 RX ADMIN — ASPIRIN 324 MG: 81 TABLET, CHEWABLE ORAL at 11:41

## 2024-10-27 NOTE — ED PROVIDER NOTES
Time: 12:30 PM EDT  Date of encounter:  10/27/2024  Independent Historian/Clinical History and Information was obtained by:   Patient    History is limited by: N/A    Chief Complaint: chest pain      History of Present Illness:  Patient is a 49 y.o. year old female who presents to the emergency department for evaluation of chest pain.  Patient is complaining of midsternal dull chest pain.  She states she has been under a lot of stress.  She admits to using cocaine last night.  She has had several previous ED visits for chest pain most recently after using cocaine.      Patient Care Team  Primary Care Provider: Tiffany Salomon MD    Past Medical History:     No Known Allergies  Past Medical History:   Diagnosis Date    Abnormal Pap smear of cervix     Allergic 2000    unsure of month/date    Allergic rhinitis     Anxiety     Contracted ligament     LUE and LLE    Contracture of muscle 04/18/2016    MULTUP SITES, WILL CONT BACLOFEN AND BOTOX    Cramp in muscle     Depression     Heart murmur     High blood pressure     Hypertension     Meningitis 01/08/2016    THIS OCCURED AS A CHILD, BUT SHE HAS A SIGNIFICANT AMOUNT OF CONTRACTURES ON HER LEFT SIDE WILL SCHEDULE PHYSICAL THERAPY REFER TO PM&R START BACLOFEN     Migraine headache     Murmur, heart     Recurrent major depressive disorder, in full remission 09/11/2017    DOING WELL ON TRINTELLIX SAYS IT KEEPS HER SMILING :)     Rheumatic fever     Syphilis     Tendinitis of hip, left 10/05/2017    Trochanteric bursitis of left hip 10/05/2017     Past Surgical History:   Procedure Laterality Date    OTHER SURGICAL HISTORY Left 1995    BROKEN BONES , LEFT ARM.  1988 & 1995     OTHER SURGICAL HISTORY      METAL IMPLANTS     ULNAR COLLATERAL LIGAMENT REPAIR      ligaments released left arm    WRIST SURGERY  1995    metal plate left wrist     Family History   Problem Relation Age of Onset    Prostate cancer Father     COPD Father         COPD    Diabetes type II Mother      Anxiety disorder Mother         anxiety    Depression Mother     Drug abuse Mother     Hyperlipidemia Mother     Miscarriages / Stillbirths Mother         miscarriage    Anxiety disorder Son         Not diagnosed but show signs/anxiety    Uterine cancer Maternal Aunt 30        to clarify if uterine or cervical    Stroke Other     Heart disease Other     Breast cancer Neg Hx     Colon cancer Neg Hx     Deep vein thrombosis Neg Hx     Pulmonary embolism Neg Hx        Home Medications:  Prior to Admission medications    Medication Sig Start Date End Date Taking? Authorizing Provider   baclofen (LIORESAL) 20 MG tablet TAKE 1 TABLET BY MOUTH THREE TIMES DAILY 8/22/24   Tiffany Salomon MD   busPIRone (BUSPAR) 15 MG tablet Take 1 tablet by mouth 3 (Three) Times a Day. 7/11/24   Tiffany Salomon MD   desvenlafaxine (PRISTIQ) 50 MG 24 hr tablet Take 1 tablet by mouth every night at bedtime. 6/9/24   ProviderCarlos MD   escitalopram (Lexapro) 5 MG tablet Take 1 tablet by mouth Daily. 5/1/24   Tiffany Salomon MD   fluticasone (FLONASE) 50 MCG/ACT nasal spray 2 sprays by Each Nare route Daily. 3/13/24   Tiffany Salomon MD   lisinopril (PRINIVIL,ZESTRIL) 20 MG tablet Take 1 tablet by mouth Daily. New medication for patient. 5/1/24   Tiffany Salomon MD   loratadine (Claritin) 10 MG tablet Take 1 tablet by mouth Daily for 30 days. 4/23/24 5/23/24  James Ventura MD   montelukast (SINGULAIR) 10 MG tablet Take 1 tablet by mouth Daily. 12/19/23   Tiffany Salomon MD   naproxen (NAPROSYN) 500 MG tablet TAKE 1 TABLET BY MOUTH TWICE DAILY WITH MEALS 7/5/24   Tiffany Salomon MD   olopatadine (PATANOL) 0.1 % ophthalmic solution Administer 1 drop to both eyes 2 (Two) Times a Day As Needed for Allergies.  Patient not taking: Reported on 5/1/2024 4/23/24   James Ventura MD   Rexulti 3 MG tablet Take 1 tablet by mouth Daily.  Patient not taking: Reported on 5/1/2024 3/13/24   Tiffany Salomon MD  "  topiramate (TOPAMAX) 50 MG tablet Take 1 tablet by mouth twice daily 24   Tiffany Salomon MD        Social History:   Social History     Tobacco Use    Smoking status: Former     Current packs/day: 0.00     Average packs/day: 0.5 packs/day for 15.9 years (7.9 ttl pk-yrs)     Types: Cigarettes     Start date: 3/21/2006     Quit date: 2022     Years since quittin.7    Smokeless tobacco: Never    Tobacco comments:     Will be stopping 2.   Vaping Use    Vaping status: Every Day    Substances: Nicotine, Flavoring    Devices: Disposable, Pre-filled pod   Substance Use Topics    Alcohol use: Not Currently     Comment: social    Drug use: Not Currently     Comment: FORMER IN THE PAST          Review of Systems:  Review of Systems   Cardiovascular:  Positive for chest pain.        Physical Exam:  /91   Pulse 89   Temp 98.7 °F (37.1 °C) (Oral)   Resp 18   Ht 175.3 cm (69\")   Wt 101 kg (222 lb 10.6 oz)   SpO2 96%   BMI 32.88 kg/m²     Physical Exam  Vitals and nursing note reviewed.   Constitutional:       General: She is not in acute distress.  HENT:      Head: Normocephalic and atraumatic.   Eyes:      Extraocular Movements: Extraocular movements intact.   Cardiovascular:      Rate and Rhythm: Normal rate and regular rhythm.      Heart sounds: Normal heart sounds.   Pulmonary:      Effort: Pulmonary effort is normal. No respiratory distress.      Breath sounds: Normal breath sounds.   Abdominal:      General: Abdomen is flat.      Palpations: Abdomen is soft.      Tenderness: There is no abdominal tenderness.   Musculoskeletal:         General: Normal range of motion.      Cervical back: Normal range of motion.   Skin:     General: Skin is warm and dry.      Capillary Refill: Capillary refill takes less than 2 seconds.   Neurological:      Mental Status: She is alert and oriented to person, place, and time. Mental status is at baseline.                  Procedures:  Procedures      Medical " Decision Making:      Comorbidities that affect care:    Hypertension    External Notes reviewed:    Previous ED Note: Patient seen 10/3/2024 for anxiety/chest pain and discharged      The following orders were placed and all results were independently analyzed by me:  Orders Placed This Encounter   Procedures    XR Chest 1 View    [place] Draw    High Sensitivity Troponin T    Comprehensive Metabolic Panel    Lipase    BNP    Magnesium    CBC Auto Differential    High Sensitivity Troponin T 2Hr    Undress & Gown    Continuous Pulse Oximetry    ECG 12 Lead ED Triage Standing Order; Chest Pain    CBC & Differential    Green Top (Gel)    Lavender Top    Gold Top - SST    Light Blue Top       Medications Given in the Emergency Department:  Medications   aspirin chewable tablet 324 mg (324 mg Oral Given 10/27/24 1141)   ketorolac (TORADOL) injection 60 mg (60 mg Intramuscular Given 10/27/24 1450)        ED Course:    ED Course as of 10/29/24 1310   Sun Oct 27, 2024   1108 EKG:    Rhythm: nsr  Rate: 86  Intervals: Normal  T-wave: Normal  ST Segment: Normal    EKG Comparison: 10/3/24    Interpreted by me   [NL]      ED Course User Index  [NL] Elver Caicedo DO       Labs:    Lab Results (last 24 hours)       ** No results found for the last 24 hours. **             Imaging:    No Radiology Exams Resulted Within Past 24 Hours      Differential Diagnosis and Discussion:    Chest Pain:  Based on the patient's signs and symptoms, I considered aortic dissection, myocardial infaction, pulmonary embolism, cardiac tamponade, pericarditis, pneumothorax, musculoskeletal chest pain and other differential diagnosis as an etiology of the patient's chest pain.     All labs were reviewed and interpreted by me.  All X-rays impressions were independently interpreted by me.  EKG was interpreted by me.    MDM  The patient is resting comfortably and feels better, is alert and in no distress. The repeat examination is unremarkable and benign.  Electrocardiogram shows no signs of acute ischemia and the history, exam, diagnostic testing and current condition did not suggest that this patient is having an acute myocardial infarction, significant arrhythmia, unstable angina, esophageal perforation, pulmonary embolism, aortic dissection, severe pneumonia, sepsis for other significant pathology that would warrant further testing, continued ED treatment, admission, cardiology or other specialist consultation at this point. The vital signs have been stable. The patient's condition is stable and appropriate for discharge. The patient will pursue further outpatient evaluation with the primary care physician, or designated physician or cardiologist.  Patient was advised to discontinue her cocaine use.  The patient has expressed a clear and thorough understanding and agreed to follow-up as instructed.            Patient Care Considerations:    CT CHEST: I considered ordering a CT scan of the chest, however patient does not appear to have clinical symptoms of pulmonary embolism      Consultants/Shared Management Plan:    None    Social Determinants of Health:    Patient is independent, reliable, and has access to care.       Disposition and Care Coordination:    Discharged: The patient is suitable and stable for discharge with no need for consideration of admission.    I have explained the patient´s condition, diagnoses and treatment plan based on the information available to me at this time. I have answered questions and addressed any concerns. The patient has a good  understanding of the patient´s diagnosis, condition, and treatment plan as can be expected at this point. The vital signs have been stable. The patient´s condition is stable and appropriate for discharge from the emergency department.      The patient will pursue further outpatient evaluation with the primary care physician or other designated or consulting physician as outlined in the discharge  instructions. They are agreeable to this plan of care and follow-up instructions have been explained in detail. The patient has received these instructions in written format and has expressed an understanding of the discharge instructions. The patient is aware that any significant change in condition or worsening of symptoms should prompt an immediate return to this or the closest emergency department or call to 911.    Final diagnoses:   Chest pain, unspecified type        ED Disposition       ED Disposition   Discharge    Condition   Stable    Comment   --               This medical record created using voice recognition software.             Elver Caicedo DO  10/29/24 1239       Elver Caicedo,   10/29/24 1312

## 2024-10-30 ENCOUNTER — APPOINTMENT (OUTPATIENT)
Dept: GENERAL RADIOLOGY | Facility: HOSPITAL | Age: 49
End: 2024-10-30
Payer: MEDICAID

## 2024-10-30 VITALS
HEART RATE: 117 BPM | BODY MASS INDEX: 36.17 KG/M2 | RESPIRATION RATE: 20 BRPM | TEMPERATURE: 98.4 F | HEIGHT: 66 IN | DIASTOLIC BLOOD PRESSURE: 109 MMHG | SYSTOLIC BLOOD PRESSURE: 171 MMHG | WEIGHT: 225.09 LBS | OXYGEN SATURATION: 96 %

## 2024-10-30 LAB
ALBUMIN SERPL-MCNC: 4 G/DL (ref 3.5–5.2)
ALBUMIN/GLOB SERPL: 1.5 G/DL
ALP SERPL-CCNC: 86 U/L (ref 39–117)
ALT SERPL W P-5'-P-CCNC: 24 U/L (ref 1–33)
ANION GAP SERPL CALCULATED.3IONS-SCNC: 12.3 MMOL/L (ref 5–15)
AST SERPL-CCNC: 21 U/L (ref 1–32)
BASOPHILS # BLD AUTO: 0.06 10*3/MM3 (ref 0–0.2)
BASOPHILS NFR BLD AUTO: 1.1 % (ref 0–1.5)
BILIRUB SERPL-MCNC: 0.5 MG/DL (ref 0–1.2)
BUN SERPL-MCNC: 3 MG/DL (ref 6–20)
BUN/CREAT SERPL: 4.5 (ref 7–25)
CALCIUM SPEC-SCNC: 9.2 MG/DL (ref 8.6–10.5)
CHLORIDE SERPL-SCNC: 103 MMOL/L (ref 98–107)
CO2 SERPL-SCNC: 22.7 MMOL/L (ref 22–29)
CREAT SERPL-MCNC: 0.67 MG/DL (ref 0.57–1)
DEPRECATED RDW RBC AUTO: 40.8 FL (ref 37–54)
EGFRCR SERPLBLD CKD-EPI 2021: 108.6 ML/MIN/1.73
EOSINOPHIL # BLD AUTO: 0.07 10*3/MM3 (ref 0–0.4)
EOSINOPHIL NFR BLD AUTO: 1.3 % (ref 0.3–6.2)
ERYTHROCYTE [DISTWIDTH] IN BLOOD BY AUTOMATED COUNT: 13.3 % (ref 12.3–15.4)
GLOBULIN UR ELPH-MCNC: 2.7 GM/DL
GLUCOSE SERPL-MCNC: 129 MG/DL (ref 65–99)
HCT VFR BLD AUTO: 42 % (ref 34–46.6)
HGB BLD-MCNC: 13.5 G/DL (ref 12–15.9)
HOLD SPECIMEN: NORMAL
HOLD SPECIMEN: NORMAL
IMM GRANULOCYTES # BLD AUTO: 0.01 10*3/MM3 (ref 0–0.05)
IMM GRANULOCYTES NFR BLD AUTO: 0.2 % (ref 0–0.5)
LIPASE SERPL-CCNC: 16 U/L (ref 13–60)
LYMPHOCYTES # BLD AUTO: 2.31 10*3/MM3 (ref 0.7–3.1)
LYMPHOCYTES NFR BLD AUTO: 42.6 % (ref 19.6–45.3)
MAGNESIUM SERPL-MCNC: 1.9 MG/DL (ref 1.6–2.6)
MCH RBC QN AUTO: 27.3 PG (ref 26.6–33)
MCHC RBC AUTO-ENTMCNC: 32.1 G/DL (ref 31.5–35.7)
MCV RBC AUTO: 84.8 FL (ref 79–97)
MONOCYTES # BLD AUTO: 0.64 10*3/MM3 (ref 0.1–0.9)
MONOCYTES NFR BLD AUTO: 11.8 % (ref 5–12)
NEUTROPHILS NFR BLD AUTO: 2.33 10*3/MM3 (ref 1.7–7)
NEUTROPHILS NFR BLD AUTO: 43 % (ref 42.7–76)
NRBC BLD AUTO-RTO: 0 /100 WBC (ref 0–0.2)
NT-PROBNP SERPL-MCNC: 110.5 PG/ML (ref 0–450)
PLATELET # BLD AUTO: 308 10*3/MM3 (ref 140–450)
PMV BLD AUTO: 10.4 FL (ref 6–12)
POTASSIUM SERPL-SCNC: 3.4 MMOL/L (ref 3.5–5.2)
PROT SERPL-MCNC: 6.7 G/DL (ref 6–8.5)
RBC # BLD AUTO: 4.95 10*6/MM3 (ref 3.77–5.28)
SODIUM SERPL-SCNC: 138 MMOL/L (ref 136–145)
TROPONIN T SERPL HS-MCNC: <6 NG/L
WBC NRBC COR # BLD AUTO: 5.42 10*3/MM3 (ref 3.4–10.8)
WHOLE BLOOD HOLD COAG: NORMAL
WHOLE BLOOD HOLD SPECIMEN: NORMAL

## 2024-10-30 PROCEDURE — 84484 ASSAY OF TROPONIN QUANT: CPT | Performed by: EMERGENCY MEDICINE

## 2024-10-30 PROCEDURE — 84484 ASSAY OF TROPONIN QUANT: CPT

## 2024-10-30 PROCEDURE — 99284 EMERGENCY DEPT VISIT MOD MDM: CPT

## 2024-10-30 PROCEDURE — 93005 ELECTROCARDIOGRAM TRACING: CPT | Performed by: EMERGENCY MEDICINE

## 2024-10-30 PROCEDURE — 71045 X-RAY EXAM CHEST 1 VIEW: CPT

## 2024-10-30 PROCEDURE — 83735 ASSAY OF MAGNESIUM: CPT | Performed by: EMERGENCY MEDICINE

## 2024-10-30 PROCEDURE — 93005 ELECTROCARDIOGRAM TRACING: CPT

## 2024-10-30 PROCEDURE — 80053 COMPREHEN METABOLIC PANEL: CPT | Performed by: EMERGENCY MEDICINE

## 2024-10-30 PROCEDURE — 85025 COMPLETE CBC W/AUTO DIFF WBC: CPT | Performed by: EMERGENCY MEDICINE

## 2024-10-30 PROCEDURE — 36415 COLL VENOUS BLD VENIPUNCTURE: CPT | Performed by: EMERGENCY MEDICINE

## 2024-10-30 PROCEDURE — 83690 ASSAY OF LIPASE: CPT | Performed by: EMERGENCY MEDICINE

## 2024-10-30 PROCEDURE — 83880 ASSAY OF NATRIURETIC PEPTIDE: CPT | Performed by: EMERGENCY MEDICINE

## 2024-10-30 RX ORDER — SODIUM CHLORIDE 0.9 % (FLUSH) 0.9 %
10 SYRINGE (ML) INJECTION AS NEEDED
Status: DISCONTINUED | OUTPATIENT
Start: 2024-10-30 | End: 2024-10-31 | Stop reason: HOSPADM

## 2024-10-30 RX ORDER — ASPIRIN 81 MG/1
324 TABLET, CHEWABLE ORAL ONCE
Status: DISCONTINUED | OUTPATIENT
Start: 2024-10-30 | End: 2024-10-31 | Stop reason: HOSPADM

## 2024-10-31 ENCOUNTER — HOSPITAL ENCOUNTER (EMERGENCY)
Facility: HOSPITAL | Age: 49
Discharge: HOME | End: 2024-10-31
Attending: EMERGENCY MEDICINE
Payer: MEDICAID

## 2024-10-31 ENCOUNTER — HOSPITAL ENCOUNTER (EMERGENCY)
Facility: HOSPITAL | Age: 49
Discharge: HOME | End: 2024-11-01
Attending: EMERGENCY MEDICINE
Payer: MEDICAID

## 2024-10-31 ENCOUNTER — APPOINTMENT (OUTPATIENT)
Dept: GENERAL RADIOLOGY | Facility: HOSPITAL | Age: 49
End: 2024-10-31
Payer: MEDICAID

## 2024-10-31 DIAGNOSIS — R07.9 NONSPECIFIC CHEST PAIN: Primary | ICD-10-CM

## 2024-10-31 DIAGNOSIS — M94.0 COSTOCHONDRITIS: ICD-10-CM

## 2024-10-31 DIAGNOSIS — J98.11 ATELECTASIS: ICD-10-CM

## 2024-10-31 DIAGNOSIS — R07.9 CHEST PAIN, UNSPECIFIED TYPE: Primary | ICD-10-CM

## 2024-10-31 DIAGNOSIS — F14.10 COCAINE ABUSE: ICD-10-CM

## 2024-10-31 DIAGNOSIS — E87.6 HYPOKALEMIA: ICD-10-CM

## 2024-10-31 LAB
ALBUMIN SERPL-MCNC: 3.8 G/DL (ref 3.5–5.2)
ALBUMIN/GLOB SERPL: 1.4 G/DL
ALP SERPL-CCNC: 83 U/L (ref 39–117)
ALT SERPL W P-5'-P-CCNC: 25 U/L (ref 1–33)
ANION GAP SERPL CALCULATED.3IONS-SCNC: 12.6 MMOL/L (ref 5–15)
AST SERPL-CCNC: 19 U/L (ref 1–32)
BASOPHILS # BLD AUTO: 0.05 10*3/MM3 (ref 0–0.2)
BASOPHILS NFR BLD AUTO: 1 % (ref 0–1.5)
BILIRUB SERPL-MCNC: 0.3 MG/DL (ref 0–1.2)
BUN SERPL-MCNC: 6 MG/DL (ref 6–20)
BUN/CREAT SERPL: 9.1 (ref 7–25)
CALCIUM SPEC-SCNC: 9.1 MG/DL (ref 8.6–10.5)
CHLORIDE SERPL-SCNC: 105 MMOL/L (ref 98–107)
CO2 SERPL-SCNC: 23.4 MMOL/L (ref 22–29)
CREAT SERPL-MCNC: 0.66 MG/DL (ref 0.57–1)
DEPRECATED RDW RBC AUTO: 41.3 FL (ref 37–54)
EGFRCR SERPLBLD CKD-EPI 2021: 109 ML/MIN/1.73
EOSINOPHIL # BLD AUTO: 0.12 10*3/MM3 (ref 0–0.4)
EOSINOPHIL NFR BLD AUTO: 2.3 % (ref 0.3–6.2)
ERYTHROCYTE [DISTWIDTH] IN BLOOD BY AUTOMATED COUNT: 13.4 % (ref 12.3–15.4)
GEN 5 2HR TROPONIN T REFLEX: <6 NG/L
GLOBULIN UR ELPH-MCNC: 2.7 GM/DL
GLUCOSE SERPL-MCNC: 105 MG/DL (ref 65–99)
HCG INTACT+B SERPL-ACNC: <0.5 MIU/ML
HCT VFR BLD AUTO: 39.1 % (ref 34–46.6)
HGB BLD-MCNC: 12.6 G/DL (ref 12–15.9)
HOLD SPECIMEN: NORMAL
HOLD SPECIMEN: NORMAL
IMM GRANULOCYTES # BLD AUTO: 0.01 10*3/MM3 (ref 0–0.05)
IMM GRANULOCYTES NFR BLD AUTO: 0.2 % (ref 0–0.5)
LIPASE SERPL-CCNC: 29 U/L (ref 13–60)
LYMPHOCYTES # BLD AUTO: 2.36 10*3/MM3 (ref 0.7–3.1)
LYMPHOCYTES NFR BLD AUTO: 45.6 % (ref 19.6–45.3)
MAGNESIUM SERPL-MCNC: 1.9 MG/DL (ref 1.6–2.6)
MCH RBC QN AUTO: 27.3 PG (ref 26.6–33)
MCHC RBC AUTO-ENTMCNC: 32.2 G/DL (ref 31.5–35.7)
MCV RBC AUTO: 84.6 FL (ref 79–97)
MONOCYTES # BLD AUTO: 0.47 10*3/MM3 (ref 0.1–0.9)
MONOCYTES NFR BLD AUTO: 9.1 % (ref 5–12)
NEUTROPHILS NFR BLD AUTO: 2.17 10*3/MM3 (ref 1.7–7)
NEUTROPHILS NFR BLD AUTO: 41.8 % (ref 42.7–76)
NRBC BLD AUTO-RTO: 0 /100 WBC (ref 0–0.2)
NT-PROBNP SERPL-MCNC: 57.9 PG/ML (ref 0–450)
PLATELET # BLD AUTO: 312 10*3/MM3 (ref 140–450)
PMV BLD AUTO: 10.4 FL (ref 6–12)
POTASSIUM SERPL-SCNC: 3.9 MMOL/L (ref 3.5–5.2)
PROT SERPL-MCNC: 6.5 G/DL (ref 6–8.5)
RBC # BLD AUTO: 4.62 10*6/MM3 (ref 3.77–5.28)
SODIUM SERPL-SCNC: 141 MMOL/L (ref 136–145)
TROPONIN T DELTA: NORMAL
TROPONIN T SERPL HS-MCNC: <6 NG/L
WBC NRBC COR # BLD AUTO: 5.18 10*3/MM3 (ref 3.4–10.8)
WHOLE BLOOD HOLD COAG: NORMAL
WHOLE BLOOD HOLD SPECIMEN: NORMAL

## 2024-10-31 PROCEDURE — 84484 ASSAY OF TROPONIN QUANT: CPT | Performed by: EMERGENCY MEDICINE

## 2024-10-31 PROCEDURE — 99284 EMERGENCY DEPT VISIT MOD MDM: CPT

## 2024-10-31 PROCEDURE — 85379 FIBRIN DEGRADATION QUANT: CPT

## 2024-10-31 PROCEDURE — 93005 ELECTROCARDIOGRAM TRACING: CPT | Performed by: EMERGENCY MEDICINE

## 2024-10-31 PROCEDURE — 84702 CHORIONIC GONADOTROPIN TEST: CPT

## 2024-10-31 PROCEDURE — 71045 X-RAY EXAM CHEST 1 VIEW: CPT

## 2024-10-31 PROCEDURE — 83690 ASSAY OF LIPASE: CPT | Performed by: EMERGENCY MEDICINE

## 2024-10-31 PROCEDURE — 80053 COMPREHEN METABOLIC PANEL: CPT | Performed by: EMERGENCY MEDICINE

## 2024-10-31 PROCEDURE — 83880 ASSAY OF NATRIURETIC PEPTIDE: CPT | Performed by: EMERGENCY MEDICINE

## 2024-10-31 PROCEDURE — 83735 ASSAY OF MAGNESIUM: CPT | Performed by: EMERGENCY MEDICINE

## 2024-10-31 PROCEDURE — 85025 COMPLETE CBC W/AUTO DIFF WBC: CPT | Performed by: EMERGENCY MEDICINE

## 2024-10-31 PROCEDURE — 36415 COLL VENOUS BLD VENIPUNCTURE: CPT

## 2024-10-31 RX ORDER — SODIUM CHLORIDE 0.9 % (FLUSH) 0.9 %
10 SYRINGE (ML) INJECTION AS NEEDED
Status: DISCONTINUED | OUTPATIENT
Start: 2024-10-31 | End: 2024-11-01 | Stop reason: HOSPADM

## 2024-10-31 RX ORDER — POTASSIUM CHLORIDE 1500 MG/1
20 TABLET, EXTENDED RELEASE ORAL DAILY
Qty: 5 TABLET | Refills: 0 | Status: SHIPPED | OUTPATIENT
Start: 2024-10-31

## 2024-10-31 RX ORDER — ASPIRIN 81 MG/1
324 TABLET, CHEWABLE ORAL ONCE
Status: COMPLETED | OUTPATIENT
Start: 2024-10-31 | End: 2024-10-31

## 2024-10-31 RX ADMIN — ASPIRIN 324 MG: 81 TABLET, CHEWABLE ORAL at 22:17

## 2024-10-31 RX ADMIN — NITROGLYCERIN 1 INCH: 20 OINTMENT TOPICAL at 23:26

## 2024-10-31 NOTE — DISCHARGE INSTRUCTIONS
Follow-up with your primary care provider within 7 days.  You are being referred to cardiology, they should call in 2-3 business days to schedule an appointment however if you have not heard from them give them a call using the number provided.  Return to ER if symptoms worsen or fail to improve.    Take potassium daily.  As discussed discontinue drugs.

## 2024-10-31 NOTE — ED TRIAGE NOTES
Pt to ED from home with reports of waking up tonight, feeling faint and feeling like heart was racing.      Pt evaluated in ED on 10/27 for same symptoms.      Pt states she has anxiety and has been without her medication x3 days.  Pt took buspar TID and has been completely out for 3 days.

## 2024-10-31 NOTE — ED PROVIDER NOTES
Time: 12:46 AM EDT  Date of encounter:  10/30/2024  Independent Historian/Clinical History and Information was obtained by:   Patient    History is limited by: N/A    Chief Complaint: Chest pain      History of Present Illness:  Patient is a 49 y.o. year old female who presents to the emergency department for evaluation of chest pain.  Patient states that has been going on for a while now and she is supposed to see cardiology however she has not been able to get into an appointment.  Patient states that she lost the referral and needs a new one.  Patient states that she has been using cocaine and feels like that is what is making her heart beat faster than normal.  Patient has no cardiac history.  Patient states that she has anxiety but she has not been taking her medications for the past 5 days.      Patient Care Team  Primary Care Provider: Tiffany Salomon MD    Past Medical History:     No Known Allergies  Past Medical History:   Diagnosis Date    Abnormal Pap smear of cervix     Allergic 2000    unsure of month/date    Allergic rhinitis     Anxiety     Contracted ligament     LUE and LLE    Contracture of muscle 04/18/2016    [facility], WILL CONT BACLOFEN AND BOTOX    Cramp in muscle     Depression     Heart murmur     High blood pressure     Hypertension     Meningitis 01/08/2016    THIS OCCURED AS A CHILD, BUT SHE HAS A SIGNIFICANT AMOUNT OF CONTRACTURES ON HER LEFT SIDE WILL SCHEDULE PHYSICAL THERAPY REFER TO PM&R START BACLOFEN     Migraine headache     Murmur, heart     Recurrent major depressive disorder, in full remission 09/11/2017    DOING WELL ON TRINTELLIX SAYS IT KEEPS HER SMILING :)     Rheumatic fever     Syphilis     Tendinitis of hip, left 10/05/2017    Trochanteric bursitis of left hip 10/05/2017     Past Surgical History:   Procedure Laterality Date    OTHER SURGICAL HISTORY Left 1995    BROKEN BONES , LEFT ARM.  1988 & 1995     OTHER SURGICAL HISTORY      METAL IMPLANTS     ULNAR  COLLATERAL LIGAMENT REPAIR      ligaments released left arm    WRIST SURGERY  1995    metal plate left wrist     Family History   Problem Relation Age of Onset    Prostate cancer Father     COPD Father         COPD    Diabetes type II Mother     Anxiety disorder Mother         anxiety    Depression Mother     Drug abuse Mother     Hyperlipidemia Mother     Miscarriages / Stillbirths Mother         miscarriage    Anxiety disorder Son         Not diagnosed but show signs/anxiety    Uterine cancer Maternal Aunt 30        to clarify if uterine or cervical    Stroke Other     Heart disease Other     Breast cancer Neg Hx     Colon cancer Neg Hx     Deep vein thrombosis Neg Hx     Pulmonary embolism Neg Hx        Home Medications:  Prior to Admission medications    Medication Sig Start Date End Date Taking? Authorizing Provider   baclofen (LIORESAL) 20 MG tablet TAKE 1 TABLET BY MOUTH THREE TIMES DAILY 8/22/24   Tiffany Salomon MD   busPIRone (BUSPAR) 15 MG tablet Take 1 tablet by mouth 3 (Three) Times a Day. 7/11/24   Tiffany Salomon MD   desvenlafaxine (PRISTIQ) 50 MG 24 hr tablet Take 1 tablet by mouth every night at bedtime. 6/9/24   ProviderCarlos MD   escitalopram (Lexapro) 5 MG tablet Take 1 tablet by mouth Daily. 5/1/24   Tiffany Salomon MD   fluticasone (FLONASE) 50 MCG/ACT nasal spray 2 sprays by Each Nare route Daily. 3/13/24   Tiffany Salomon MD   lisinopril (PRINIVIL,ZESTRIL) 20 MG tablet Take 1 tablet by mouth Daily. New medication for patient. 5/1/24   Tiffany Salomon MD   loratadine (Claritin) 10 MG tablet Take 1 tablet by mouth Daily for 30 days. 4/23/24 5/23/24  Jmaes Ventura MD   montelukast (SINGULAIR) 10 MG tablet Take 1 tablet by mouth Daily. 12/19/23   Tiffany Salomon MD   naproxen (NAPROSYN) 500 MG tablet TAKE 1 TABLET BY MOUTH TWICE DAILY WITH MEALS 7/5/24   Tiffany Salomon MD   olopatadine (PATANOL) 0.1 % ophthalmic solution Administer 1 drop to both eyes 2  "(Two) Times a Day As Needed for Allergies.  Patient not taking: Reported on 2024   James Ventura MD   Rexulti 3 MG tablet Take 1 tablet by mouth Daily.  Patient not taking: Reported on 2024 3/13/24   Tiffany Salomon MD   topiramate (TOPAMAX) 50 MG tablet Take 1 tablet by mouth twice daily 24   Tiffany Salomon MD        Social History:   Social History     Tobacco Use    Smoking status: Former     Current packs/day: 0.00     Average packs/day: 0.5 packs/day for 15.9 years (7.9 ttl pk-yrs)     Types: Cigarettes     Start date: 3/21/2006     Quit date: 2022     Years since quittin.7    Smokeless tobacco: Never    Tobacco comments:     Will be stopping 2.1.   Vaping Use    Vaping status: Every Day    Substances: Nicotine, Flavoring    Devices: Disposable, Pre-filled pod   Substance Use Topics    Alcohol use: Not Currently     Comment: social    Drug use: Not Currently     Comment: FORMER IN THE PAST          Review of Systems:  Review of Systems   Cardiovascular:  Positive for chest pain.   Neurological:  Positive for weakness.        Physical Exam:  BP (!) 171/109 (BP Location: Right arm, Patient Position: Sitting)   Pulse 117   Temp 98.4 °F (36.9 °C) (Oral)   Resp 20   Ht 167.6 cm (66\")   Wt 102 kg (225 lb 1.4 oz)   LMP 10/16/2024   SpO2 96%   BMI 36.33 kg/m²     Physical Exam  Vitals reviewed.   Constitutional:       Appearance: Normal appearance.   Cardiovascular:      Rate and Rhythm: Regular rhythm. Tachycardia present.      Heart sounds: Normal heart sounds.   Pulmonary:      Effort: Pulmonary effort is normal.      Breath sounds: Normal breath sounds.   Neurological:      Comments: Left-sided weakness residual from prior meningitis   Psychiatric:         Behavior: Behavior is cooperative.                  Procedures:  Procedures      Medical Decision Making:      Comorbidities that affect care:    Hypertension, Smoking, Substance Abuse    External Notes " reviewed:    Previous ED Note: Patient seen on 10/27/2024 for chest pain and had similar workup      The following orders were placed and all results were independently analyzed by me:  Orders Placed This Encounter   Procedures    XR Chest 1 View    [place] Draw    High Sensitivity Troponin T    Comprehensive Metabolic Panel    Lipase    BNP    Magnesium    CBC Auto Differential    High Sensitivity Troponin T 2Hr    Ambulatory Referral to Cardiology    NPO Diet NPO Type: Strict NPO    Undress & Gown    Continuous Pulse Oximetry    Oxygen Therapy- Nasal Cannula; Titrate 1-6 LPM Per SpO2; 90 - 95%    ECG 12 Lead ED Triage Standing Order; Chest Pain    ECG 12 Lead ED Triage Standing Order; Chest Pain    Insert Peripheral IV    CBC & Differential    Green Top (Gel)    Lavender Top    Gold Top - SST    Light Blue Top       Medications Given in the Emergency Department:  Medications   sodium chloride 0.9 % flush 10 mL (has no administration in time range)   aspirin chewable tablet 324 mg (has no administration in time range)        ED Course:    ED Course as of 10/31/24 0212   Thu Oct 31, 2024   0051 Comprehensive Metabolic Panel(!)  The patient´s CMP that was reviewed and interpretted by me shows no abnormalities of critical concern. Of note, the patient´s sodium and potassium are acceptable. The patient´s liver enzymes are unremarkable. The patient´s renal function (creatinine) is preserved. The patient has a normal anion gap.    Potassium being replaced [AS]   0051 CBC & Differential  The patient´s CBC that was reviewed and interpreted by me shows no abnormalities of critical concern. Of note, there is no anemia requiring a blood transfusion and the platelet count is acceptable. [AS]   0051 XR Chest 1 View  Negative x-ray [AS]      ED Course User Index  [AS] Seaver, Alyce B, APRADAM       Labs:    Lab Results (last 24 hours)       Procedure Component Value Units Date/Time    High Sensitivity Troponin T [[id number]]   (Normal) Collected: 10/30/24 2150    Specimen: Blood from Arm, Right Updated: 10/30/24 2233     HS Troponin T <6 ng/L     Narrative:      High Sensitive Troponin T Reference Range:  <14.0 ng/L- Negative Female for AMI  <22.0 ng/L- Negative Male for AMI  >=14 - Abnormal Female indicating possible myocardial injury.  >=22 - Abnormal Male indicating possible myocardial injury.   Clinicians would have to utilize clinical acumen, EKG, Troponin, and serial changes to determine if it is an Acute Myocardial Infarction or myocardial injury due to an underlying chronic condition.         CBC & Differential [[id number]]  (Normal) Collected: 10/30/24 2150    Specimen: Blood from Arm, Right Updated: 10/30/24 2207    Narrative:      The following orders were created for panel order CBC & Differential.  Procedure                               Abnormality         Status                     ---------                               -----------         ------                     CBC Auto Differential[[id number]]        Normal              Final result                 Please view results for these tests on the individual orders.    Comprehensive Metabolic Panel [[id number]]  (Abnormal) Collected: 10/30/24 2150    Specimen: Blood from Arm, Right Updated: 10/30/24 2233     Glucose 129 mg/dL      BUN 3 mg/dL      Creatinine 0.67 mg/dL      Sodium 138 mmol/L      Potassium 3.4 mmol/L      Chloride 103 mmol/L      CO2 22.7 mmol/L      Calcium 9.2 mg/dL      Total Protein 6.7 g/dL      Albumin 4.0 g/dL      ALT (SGPT) 24 U/L      AST (SGOT) 21 U/L      Alkaline Phosphatase 86 U/L      Total Bilirubin 0.5 mg/dL      Globulin 2.7 gm/dL      A/G Ratio 1.5 g/dL      BUN/Creatinine Ratio 4.5     Anion Gap 12.3 mmol/L      eGFR 108.6 mL/min/1.73     Narrative:      GFR Normal >60  Chronic Kidney Disease <60  Kidney Failure <15      Lipase [[id number]]  (Normal) Collected: 10/30/24 2150    Specimen: Blood from Arm, Right Updated: 10/30/24 2233      Lipase 16 U/L     BNP [[id number]]  (Normal) Collected: 10/30/24 2150    Specimen: Blood from Arm, Right Updated: 10/30/24 2228     proBNP 110.5 pg/mL     Narrative:      This assay is used as an aid in the diagnosis of individuals suspected of having heart failure. It can be used as an aid in the diagnosis of acute decompensated heart failure (ADHF) in patients presenting with signs and symptoms of ADHF to the emergency department (ED). In addition, NT-proBNP of <300 pg/mL indicates ADHF is not likely.    Age Range Result Interpretation  NT-proBNP Concentration (pg/mL:      <50             Positive            >450                   Gray                 300-450                    Negative             <300    50-75           Positive            >900                  Gray                300-900                  Negative            <300      >75             Positive            >1800                  Gray                300-1800                  Negative            <300    Magnesium [[id number]]  (Normal) Collected: 10/30/24 2150    Specimen: Blood from Arm, Right Updated: 10/30/24 2233     Magnesium 1.9 mg/dL     CBC Auto Differential [[id number]]  (Normal) Collected: 10/30/24 2150    Specimen: Blood from Arm, Right Updated: 10/30/24 2207     WBC 5.42 10*3/mm3      RBC 4.95 10*6/mm3      Hemoglobin 13.5 g/dL      Hematocrit 42.0 %      MCV 84.8 fL      MCH 27.3 pg      MCHC 32.1 g/dL      RDW 13.3 %      RDW-SD 40.8 fl      MPV 10.4 fL      Platelets 308 10*3/mm3      Neutrophil % 43.0 %      Lymphocyte % 42.6 %      Monocyte % 11.8 %      Eosinophil % 1.3 %      Basophil % 1.1 %      Immature Grans % 0.2 %      Neutrophils, Absolute 2.33 10*3/mm3      Lymphocytes, Absolute 2.31 10*3/mm3      Monocytes, Absolute 0.64 10*3/mm3      Eosinophils, Absolute 0.07 10*3/mm3      Basophils, Absolute 0.06 10*3/mm3      Immature Grans, Absolute 0.01 10*3/mm3      nRBC 0.0 /100 WBC     High Sensitivity Troponin T 2Hr [[id number]]  Collected: 10/30/24 2343    Specimen: Blood Updated: 10/31/24 0015     HS Troponin T <6 ng/L      Troponin T Delta --     Comment: Unable to calculate.       Narrative:      High Sensitive Troponin T Reference Range:  <14.0 ng/L- Negative Female for AMI  <22.0 ng/L- Negative Male for AMI  >=14 - Abnormal Female indicating possible myocardial injury.  >=22 - Abnormal Male indicating possible myocardial injury.   Clinicians would have to utilize clinical acumen, EKG, Troponin, and serial changes to determine if it is an Acute Myocardial Infarction or myocardial injury due to an underlying chronic condition.                  Imaging:    XR Chest 1 View    Result Date: 10/30/2024  XR CHEST 1 VW Date of Exam: 10/30/2024 9:49 PM EDT Indication: Chest Pain Triage Protocol Comparison: Chest radiograph 10/27/2024 Findings: Mediastinum: Cardiomediastinal silhouette appears unchanged and normal in size Lungs: Mild left basal opacities likely representing atelectasis. Pleura: No pleural effusion or pneumothorax. Bones and soft tissues: No acute osseous abnormality.     Impression: No convincing acute intrathoracic finding. Mild left basal opacities likely representing atelectasis Electronically Signed: Cali Pablo  10/30/2024 10:09 PM EDT  Workstation ID: [id number]       Differential Diagnosis and Discussion:    Chest Pain:  Based on the patient's signs and symptoms, I considered aortic dissection, myocardial infaction, pulmonary embolism, cardiac tamponade, pericarditis, pneumothorax, musculoskeletal chest pain and other differential diagnosis as an etiology of the patient's chest pain.     All labs were reviewed and interpreted by me.  All X-rays impressions were independently interpreted by me.    MDM       Patient Care Considerations:    ANTIBIOTICS: I considered prescribing antibiotics as an outpatient however no bacterial focus of infection was found.      Consultants/Shared Management Plan:    None    Social  Determinants of Health:    Patient is independent, reliable, and has access to care.       Disposition and Care Coordination:    Discharged: I considered escalation of care by admitting this patient to the hospital, however patient having no new symptoms compared to 10/27/2024 and is safe for outpatient follow-up    I have explained the patient´s condition, diagnoses and treatment plan based on the information available to me at this time. I have answered questions and addressed any concerns. The patient has a good  understanding of the patient´s diagnosis, condition, and treatment plan as can be expected at this point. The vital signs have been stable. The patient´s condition is stable and appropriate for discharge from the emergency department.      The patient will pursue further outpatient evaluation with the primary care physician or other designated or consulting physician as outlined in the discharge instructions. They are agreeable to this plan of care and follow-up instructions have been explained in detail. The patient has received these instructions in written format and has expressed an understanding of the discharge instructions. The patient is aware that any significant change in condition or worsening of symptoms should prompt an immediate return to this or the closest emergency department or call to 911.  I have explained discharge medications and the need for follow up with the patient/caretakers. This was also printed in the discharge instructions. Patient was discharged with the following medications and follow up:      Medication List        New Prescriptions      potassium chloride 20 MEQ CR tablet  Commonly known as: KLOR-CON M20  Take 1 tablet by mouth Daily.               Where to Get Your Medications        These medications were sent to [facility]/pharmacy #[id number] - [place], [place] - [address] - [phone]  - [phone] FX  [address] [place] [address]      Hours:  24-hours Phone: [phone]   potassium chloride 20 MEQ CR tablet      [facility]  [address]  [facility] [address] [phone]        Tiffany Salomon MD  [address] [id number]  [phone]             Final diagnoses:   Nonspecific chest pain   Atelectasis   Hypokalemia   Cocaine abuse        ED Disposition       ED Disposition   Discharge    Condition   Stable    Comment   --               This medical record created using voice recognition software.             Seaver, Alyce B, APRN  10/31/24 0212

## 2024-11-01 VITALS
DIASTOLIC BLOOD PRESSURE: 91 MMHG | WEIGHT: 224.87 LBS | HEIGHT: 66 IN | TEMPERATURE: 98.9 F | HEART RATE: 101 BPM | RESPIRATION RATE: 23 BRPM | BODY MASS INDEX: 36.14 KG/M2 | SYSTOLIC BLOOD PRESSURE: 141 MMHG | OXYGEN SATURATION: 98 %

## 2024-11-01 LAB
D DIMER PPP FEU-MCNC: 0.35 MCGFEU/ML (ref 0–0.5)
QT INTERVAL: 344 MS
QT INTERVAL: 372 MS
QTC INTERVAL: 437 MS
QTC INTERVAL: 482 MS

## 2024-11-01 PROCEDURE — 93005 ELECTROCARDIOGRAM TRACING: CPT

## 2024-11-01 RX ORDER — BACLOFEN 20 MG/1
20 TABLET ORAL 3 TIMES DAILY
Qty: 90 TABLET | Refills: 0 | Status: SHIPPED | OUTPATIENT
Start: 2024-11-01

## 2024-11-01 NOTE — ED PROVIDER NOTES
Time: 11:35 PM EDT  Date of encounter:  10/31/2024  Independent Historian/Clinical History and Information was obtained by:   Patient    History is limited by: N/A    Chief Complaint: chest pain       History of Present Illness:  Patient is a 49 y.o. year old female who presents to the emergency department for evaluation of chest pain that began 2 days ago.  Patient states she was seen in ED yesterday and was given a cardiology referral.  Patient denies shortness of breath.  Patient denies cardiac history.      Patient Care Team  Primary Care Provider: Tiffany Salomon MD    Past Medical History:     No Known Allergies  Past Medical History:   Diagnosis Date    Abnormal Pap smear of cervix     Allergic 2000    unsure of month/date    Allergic rhinitis     Anxiety     Contracted ligament     LUE and LLE    Contracture of muscle 04/18/2016    [facility], WILL CONT BACLOFEN AND BOTOX    Cramp in muscle     Depression     Heart murmur     High blood pressure     Hypertension     Meningitis 01/08/2016    THIS OCCURED AS A CHILD, BUT SHE HAS A SIGNIFICANT AMOUNT OF CONTRACTURES ON HER LEFT SIDE WILL SCHEDULE PHYSICAL THERAPY REFER TO PM&R START BACLOFEN     Migraine headache     Murmur, heart     Recurrent major depressive disorder, in full remission 09/11/2017    DOING WELL ON TRINTELLIX SAYS IT KEEPS HER SMILING :)     Rheumatic fever     Syphilis     Tendinitis of hip, left 10/05/2017    Trochanteric bursitis of left hip 10/05/2017     Past Surgical History:   Procedure Laterality Date    OTHER SURGICAL HISTORY Left 1995    BROKEN BONES , LEFT ARM.  1988 & 1995     OTHER SURGICAL HISTORY      METAL IMPLANTS     ULNAR COLLATERAL LIGAMENT REPAIR      ligaments released left arm    WRIST SURGERY  1995    metal plate left wrist     Family History   Problem Relation Age of Onset    Prostate cancer Father     COPD Father         COPD    Diabetes type II Mother     Anxiety disorder Mother         anxiety    Depression  Mother     Drug abuse Mother     Hyperlipidemia Mother     Miscarriages / Stillbirths Mother         miscarriage    Anxiety disorder Son         Not diagnosed but show signs/anxiety    Uterine cancer Maternal Aunt 30        to clarify if uterine or cervical    Stroke Other     Heart disease Other     Breast cancer Neg Hx     Colon cancer Neg Hx     Deep vein thrombosis Neg Hx     Pulmonary embolism Neg Hx        Home Medications:  Prior to Admission medications    Medication Sig Start Date End Date Taking? Authorizing Provider   baclofen (LIORESAL) 20 MG tablet TAKE 1 TABLET BY MOUTH THREE TIMES DAILY 8/22/24   Tiffany Salomon MD   busPIRone (BUSPAR) 15 MG tablet Take 1 tablet by mouth 3 (Three) Times a Day. 7/11/24   Tiffany Salomon MD   desvenlafaxine (PRISTIQ) 50 MG 24 hr tablet Take 1 tablet by mouth every night at bedtime. 6/9/24   ProviderCarlos MD   escitalopram (Lexapro) 5 MG tablet Take 1 tablet by mouth Daily. 5/1/24   Tiffany Salomon MD   fluticasone (FLONASE) 50 MCG/ACT nasal spray 2 sprays by Each Nare route Daily. 3/13/24   Tiffany Salomon MD   lisinopril (PRINIVIL,ZESTRIL) 20 MG tablet Take 1 tablet by mouth Daily. New medication for patient. 5/1/24   Tiffany Salomon MD   loratadine (Claritin) 10 MG tablet Take 1 tablet by mouth Daily for 30 days. 4/23/24 5/23/24  James Ventura MD   montelukast (SINGULAIR) 10 MG tablet Take 1 tablet by mouth Daily. 12/19/23   Tiffany Salomon MD   naproxen (NAPROSYN) 500 MG tablet TAKE 1 TABLET BY MOUTH TWICE DAILY WITH MEALS 7/5/24   Tiffany Salomon MD   olopatadine (PATANOL) 0.1 % ophthalmic solution Administer 1 drop to both eyes 2 (Two) Times a Day As Needed for Allergies.  Patient not taking: Reported on 5/1/2024 4/23/24   James Ventura MD   potassium chloride (KLOR-CON M20) 20 MEQ CR tablet Take 1 tablet by mouth Daily. 10/31/24   Seaver, Alyce B, APRN   Rexulti 3 MG tablet Take 1 tablet by mouth Daily.  Patient not  "taking: Reported on 2024 3/13/24   Tiffany Salomon MD   topiramate (TOPAMAX) 50 MG tablet Take 1 tablet by mouth twice daily 24   Tiffany Salomon MD        Social History:   Social History     Tobacco Use    Smoking status: Former     Current packs/day: 0.00     Average packs/day: 0.5 packs/day for 15.9 years (7.9 ttl pk-yrs)     Types: Cigarettes     Start date: 3/21/2006     Quit date: 2022     Years since quittin.7    Smokeless tobacco: Never    Tobacco comments:     Will be stopping 2.   Vaping Use    Vaping status: Every Day    Substances: Nicotine, Flavoring    Devices: Disposable, Pre-filled pod   Substance Use Topics    Alcohol use: Not Currently     Comment: social    Drug use: Not Currently     Comment: FORMER IN THE PAST          Review of Systems:  Review of Systems   Constitutional:  Negative for chills and fever.   HENT:  Negative for congestion, rhinorrhea and sore throat.    Eyes:  Negative for pain and visual disturbance.   Respiratory:  Negative for apnea, cough, chest tightness and shortness of breath.    Cardiovascular:  Positive for chest pain. Negative for palpitations.   Gastrointestinal:  Negative for abdominal pain, diarrhea, nausea and vomiting.   Genitourinary:  Negative for difficulty urinating and dysuria.   Musculoskeletal:  Negative for joint swelling and myalgias.   Skin:  Negative for color change.   Neurological:  Negative for seizures and headaches.   Psychiatric/Behavioral: Negative.     All other systems reviewed and are negative.       Physical Exam:  /100   Pulse 101   Temp 99 °F (37.2 °C) (Oral)   Resp 18   Ht 167.6 cm (65.98\")   Wt 102 kg (224 lb 13.9 oz)   LMP 10/16/2024   SpO2 99%   BMI 36.31 kg/m²     Physical Exam  Vitals and nursing note reviewed.   Constitutional:       General: She is not in acute distress.     Appearance: Normal appearance. She is not toxic-appearing.   HENT:      Head: Normocephalic and atraumatic.      Jaw: " There is normal jaw occlusion.   Eyes:      General: Lids are normal.      Extraocular Movements: Extraocular movements intact.      Conjunctiva/sclera: Conjunctivae normal.      Pupils: Pupils are equal, round, and reactive to light.   Cardiovascular:      Rate and Rhythm: Normal rate and regular rhythm.      Pulses: Normal pulses.      Heart sounds: Normal heart sounds.   Pulmonary:      Effort: Pulmonary effort is normal. No respiratory distress.      Breath sounds: Normal breath sounds. No wheezing or rhonchi.   Chest:      Chest wall: Tenderness (Worse with palpation to anterior aspect of chest) present.   Abdominal:      General: Abdomen is flat.      Palpations: Abdomen is soft.      Tenderness: There is no abdominal tenderness. There is no guarding or rebound.   Musculoskeletal:         General: Normal range of motion.      Cervical back: Normal range of motion and neck supple.      Right lower leg: No edema.      Left lower leg: No edema.   Skin:     General: Skin is warm and dry.   Neurological:      Mental Status: She is alert and oriented to person, place, and time. Mental status is at baseline.   Psychiatric:         Mood and Affect: Mood normal.                  Procedures:  Procedures      Medical Decision Making:      Comorbidities that affect care:    Hypertension    External Notes reviewed:          The following orders were placed and all results were independently analyzed by me:  Orders Placed This Encounter   Procedures    XR Chest 1 View    [place] Draw    High Sensitivity Troponin T    Comprehensive Metabolic Panel    Lipase    BNP    Magnesium    CBC Auto Differential    hCG, Quantitative, Pregnancy    D-dimer, Quantitative    NPO Diet NPO Type: Strict NPO    Undress & Gown    Continuous Pulse Oximetry    Oxygen Therapy- Nasal Cannula; Titrate 1-6 LPM Per SpO2; 90 - 95%    ECG 12 Lead ED Triage Standing Order; Chest Pain    ECG 12 Lead ED Triage Standing Order; Chest Pain    Insert  Peripheral IV    CBC & Differential    Green Top (Gel)    Lavender Top    Gold Top - SST    Light Blue Top       Medications Given in the Emergency Department:  Medications   sodium chloride 0.9 % flush 10 mL (has no administration in time range)   aspirin chewable tablet 324 mg (324 mg Oral Given 10/31/24 2217)   nitroglycerin (NITROSTAT) ointment 1 inch (1 inch Topical Given 10/31/24 2326)        ED Course:         Labs:    Lab Results (last 24 hours)       Procedure Component Value Units Date/Time    High Sensitivity Troponin T [[id number]]  (Normal) Collected: 10/31/24 2216    Specimen: Blood Updated: 10/31/24 2242     HS Troponin T <6 ng/L     Narrative:      High Sensitive Troponin T Reference Range:  <14.0 ng/L- Negative Female for AMI  <22.0 ng/L- Negative Male for AMI  >=14 - Abnormal Female indicating possible myocardial injury.  >=22 - Abnormal Male indicating possible myocardial injury.   Clinicians would have to utilize clinical acumen, EKG, Troponin, and serial changes to determine if it is an Acute Myocardial Infarction or myocardial injury due to an underlying chronic condition.         CBC & Differential [[id number]]  (Abnormal) Collected: 10/31/24 2216    Specimen: Blood Updated: 10/31/24 2223    Narrative:      The following orders were created for panel order CBC & Differential.  Procedure                               Abnormality         Status                     ---------                               -----------         ------                     CBC Auto Differential[[id number]]        Abnormal            Final result                 Please view results for these tests on the individual orders.    Comprehensive Metabolic Panel [[id number]]  (Abnormal) Collected: 10/31/24 2216    Specimen: Blood Updated: 10/31/24 2309     Glucose 105 mg/dL      BUN 6 mg/dL      Creatinine 0.66 mg/dL      Sodium 141 mmol/L      Potassium 3.9 mmol/L      Chloride 105 mmol/L      CO2 23.4 mmol/L      Calcium 9.1  mg/dL      Total Protein 6.5 g/dL      Albumin 3.8 g/dL      ALT (SGPT) 25 U/L      AST (SGOT) 19 U/L      Alkaline Phosphatase 83 U/L      Total Bilirubin 0.3 mg/dL      Globulin 2.7 gm/dL      A/G Ratio 1.4 g/dL      BUN/Creatinine Ratio 9.1     Anion Gap 12.6 mmol/L      eGFR 109.0 mL/min/1.73     Narrative:      GFR Normal >60  Chronic Kidney Disease <60  Kidney Failure <15      Lipase [[id number]]  (Normal) Collected: 10/31/24 2216    Specimen: Blood Updated: 10/31/24 2242     Lipase 29 U/L     BNP [[id number]]  (Normal) Collected: 10/31/24 2216    Specimen: Blood Updated: 10/31/24 2241     proBNP 57.9 pg/mL     Narrative:      This assay is used as an aid in the diagnosis of individuals suspected of having heart failure. It can be used as an aid in the diagnosis of acute decompensated heart failure (ADHF) in patients presenting with signs and symptoms of ADHF to the emergency department (ED). In addition, NT-proBNP of <300 pg/mL indicates ADHF is not likely.    Age Range Result Interpretation  NT-proBNP Concentration (pg/mL:      <50             Positive            >450                   Gray                 300-450                    Negative             <300    50-75           Positive            >900                  Gray                300-900                  Negative            <300      >75             Positive            >1800                  Gray                300-1800                  Negative            <300    Magnesium [[id number]]  (Normal) Collected: 10/31/24 2216    Specimen: Blood Updated: 10/31/24 2309     Magnesium 1.9 mg/dL     CBC Auto Differential [[id number]]  (Abnormal) Collected: 10/31/24 2216    Specimen: Blood Updated: 10/31/24 2223     WBC 5.18 10*3/mm3      RBC 4.62 10*6/mm3      Hemoglobin 12.6 g/dL      Hematocrit 39.1 %      MCV 84.6 fL      MCH 27.3 pg      MCHC 32.2 g/dL      RDW 13.4 %      RDW-SD 41.3 fl      MPV 10.4 fL      Platelets 312 10*3/mm3      Neutrophil % 41.8 %       Lymphocyte % 45.6 %      Monocyte % 9.1 %      Eosinophil % 2.3 %      Basophil % 1.0 %      Immature Grans % 0.2 %      Neutrophils, Absolute 2.17 10*3/mm3      Lymphocytes, Absolute 2.36 10*3/mm3      Monocytes, Absolute 0.47 10*3/mm3      Eosinophils, Absolute 0.12 10*3/mm3      Basophils, Absolute 0.05 10*3/mm3      Immature Grans, Absolute 0.01 10*3/mm3      nRBC 0.0 /100 WBC     hCG, Quantitative, Pregnancy [[id number]] Collected: 10/31/24 2216    Specimen: Blood Updated: 10/31/24 2248     HCG Quantitative <0.50 mIU/mL     Narrative:      HCG Ranges by Gestational Age    Females - non-pregnant premenopausal   </= 1mIU/mL HCG  Females - postmenopausal               </= 7mIU/mL HCG    3 Weeks       5.4   -      72 mIU/mL  4 Weeks      10.2   -     708 mIU/mL  5 Weeks       217   -   8,245 mIU/mL  6 Weeks       152   -  32,177 mIU/mL  7 Weeks     4,059   - 153,767 mIU/mL  8 Weeks    31,366   - 149,094 mIU/mL  9 Weeks    59,109   - 135,901 mIU/mL  10 Weeks   44,186   - 170,409 mIU/mL  12 Weeks   27,107   - 201,615 mIU/mL  14 Weeks   24,302   -  93,646 mIU/mL  15 Weeks   12,540   -  69,747 mIU/mL  16 Weeks    8,904   -  55,332 mIU/mL  17 Weeks    8,240   -  51,793 mIU/mL  18 Weeks    9,649   -  55,271 mIU/mL      D-dimer, Quantitative [[id number]]  (Normal) Collected: 10/31/24 2216    Specimen: Blood Updated: 11/01/24 0007     D-Dimer, Quantitative 0.35 MCGFEU/mL     Narrative:      According to the assay 's published package insert, a normal (<0.50 MCGFEU/mL) D-dimer result in conjunction with a non-high clinical probability assessment, excludes deep vein thrombosis (DVT) and pulmonary embolism (PE) with high sensitivity.    D-dimer values increase with age and this can make VTE exclusion of an older population difficult. To address this, the [employer], based on best available evidence and recent guidelines, recommends that clinicians use age-adjusted D-dimer thresholds in patients  "greater than 50 years of age with: a) a low probability of PE who do not meet all Pulmonary Embolism Rule Out Criteria, or b) in those with intermediate probability of PE.   The formula for an age-adjusted D-dimer cut-off is \"age/100\".  For example, a 60 year old patient would have an age-adjusted cut-off of 0.60 MCGFEU/mL and an 80 year old 0.80 MCGFEU/mL.             Imaging:    XR Chest 1 View    Result Date: 10/31/2024  XR CHEST 1 VW Date of Exam: 10/31/2024 10:01 PM EDT Indication: Chest Pain Triage Protocol Comparison: 10/30/2024 and prior Findings: Study limited by overlying support and monitoring apparatus. Patient is rotated. Heart and mediastinal contours are within normal limits. Increased opacity at the left base again noted. Osseous structures are unremarkable.     Impression: Probable atelectasis left base with approximately change given differences in technique Electronically Signed: Eamon Daniels MD  10/31/2024 10:28 PM EDT  Workstation ID: [id number]       Differential Diagnosis and Discussion:    Chest Pain:  Based on the patient's signs and symptoms, I considered aortic dissection, myocardial infaction, pulmonary embolism, cardiac tamponade, pericarditis, pneumothorax, musculoskeletal chest pain and other differential diagnosis as an etiology of the patient's chest pain.     All labs were reviewed and interpreted by me.  All X-rays impressions were independently interpreted by me.  EKG was interpreted by supervising attending.    MDM     CBC shows no evidence of leukocytosis.  BNP and troponin within normal limits.  D-dimer within normal limits.  The patient´s CMP that was reviewed and interpretted by me shows no abnormalities of critical concern. Of note, the patient´s sodium and potassium are acceptable. The patient´s liver enzymes are unremarkable. The patient´s renal function (creatinine) is preserved. The patient has a normal anion gap.  Patient's oxygen saturation is 99% on room air.  " Patient's heart score is 2 indicating outpatient follow-up appropriate.  Patient has an outpatient cardiology appointment scheduled in the next 2 weeks.  I instructed patient to follow-up.  I instructed patient to return to ED if she develops any new or worsening symptoms.  Otherwise follow-up with cardiology and PCP.  Patient states she understands and agrees with plan of care.      D-dimer within normal limits.    Patient states she ran out of her baclofen and was wanting a refill.    Patient's chest pain was reproducible upon palpation consistent with costochondritis.            Patient Care Considerations:          Consultants/Shared Management Plan:    None    Social Determinants of Health:    Patient is independent, reliable, and has access to care.       Disposition and Care Coordination:    Discharged: The patient is suitable and stable for discharge with no need for consideration of admission.    I have explained the patient´s condition, diagnoses and treatment plan based on the information available to me at this time. I have answered questions and addressed any concerns. The patient has a good  understanding of the patient´s diagnosis, condition, and treatment plan as can be expected at this point. The vital signs have been stable. The patient´s condition is stable and appropriate for discharge from the emergency department.      The patient will pursue further outpatient evaluation with the primary care physician or other designated or consulting physician as outlined in the discharge instructions. They are agreeable to this plan of care and follow-up instructions have been explained in detail. The patient has received these instructions in written format and has expressed an understanding of the discharge instructions. The patient is aware that any significant change in condition or worsening of symptoms should prompt an immediate return to this or the closest emergency department or call to 911.  I  have explained discharge medications and the need for follow up with the patient/caretakers. This was also printed in the discharge instructions. Patient was discharged with the following medications and follow up:      Where to Get Your Medications        These medications were sent to [facility]/pharmacy #[id number] - [place], [place] - [address] - [phone]  - [phone] FX  [address] [place] [address]      Hours: 24-hours Phone: [phone]   baclofen 20 MG tablet          Medication List      No changes were made to your prescriptions during this visit.      Tiffany Salomon MD  [address] [place] [id number] [phone]    Call in 1 day  To schedule follow-up       Final diagnoses:   Chest pain, unspecified type   Costochondritis        ED Disposition       ED Disposition   Discharge    Condition   Stable    Comment   --               This medical record created using voice recognition software.             Juan Diehl PA-C  11/01/24 0026

## 2024-11-04 LAB
QT INTERVAL: 346 MS
QT INTERVAL: 353 MS
QT INTERVAL: 369 MS
QT INTERVAL: 382 MS
QTC INTERVAL: 458 MS
QTC INTERVAL: 459 MS
QTC INTERVAL: 464 MS
QTC INTERVAL: 475 MS

## 2024-11-05 RX ORDER — BUSPIRONE HYDROCHLORIDE 15 MG/1
15 TABLET ORAL 3 TIMES DAILY
Qty: 90 TABLET | Refills: 0 | Status: SHIPPED | OUTPATIENT
Start: 2024-11-05

## 2024-11-13 ENCOUNTER — OFFICE VISIT (OUTPATIENT)
Dept: CARDIOLOGY | Facility: CLINIC | Age: 49
End: 2024-11-13

## 2024-11-13 VITALS
BODY MASS INDEX: 35.03 KG/M2 | SYSTOLIC BLOOD PRESSURE: 169 MMHG | HEIGHT: 66 IN | HEART RATE: 113 BPM | DIASTOLIC BLOOD PRESSURE: 107 MMHG | WEIGHT: 218 LBS

## 2024-11-13 DIAGNOSIS — F41.9 ANXIETY: ICD-10-CM

## 2024-11-13 DIAGNOSIS — R07.2 PRECORDIAL PAIN: Primary | ICD-10-CM

## 2024-11-13 DIAGNOSIS — I10 HYPERTENSION, ESSENTIAL: ICD-10-CM

## 2024-11-13 RX ORDER — MULTIPLE VITAMINS W/ MINERALS TAB 9MG-400MCG
1 TAB ORAL DAILY
COMMUNITY

## 2024-11-13 NOTE — PROGRESS NOTES
Chief Complaint  Chest Pain    Subjective            Mallory Mata presents to [facility] CARDIOLOGY    49-year-old female.  She has no previous cardiac problems.  She does have high anxiety and frequent anxiety attacks.  During these episodes she feels chest pain described as pressure and tightness.  She also reports subjective tachycardia.  She has had several emergency room visits for this.  Each of these visits her EKGs have looked fine, biomarkers have been negative.  She has not had any other cardiac workup.  She does vape.  She has hypertension and is on an ACE inhibitor.  She does report a history of drug use as well and previously had a overdose attempt in the past.    [facility]  Past Medical History:   Diagnosis Date    Abnormal Pap smear of cervix     Allergic 2000    unsure of month/date    Allergic rhinitis     Anxiety     Contracted ligament     LUE and LLE    Contracture of muscle 04/18/2016    [facility], WILL CONT BACLOFEN AND BOTOX    Cramp in muscle     Depression     Heart murmur     High blood pressure     Hypertension     Meningitis 01/08/2016    THIS OCCURED AS A CHILD, BUT SHE HAS A SIGNIFICANT AMOUNT OF CONTRACTURES ON HER LEFT SIDE WILL SCHEDULE PHYSICAL THERAPY REFER TO PM&R START BACLOFEN     Migraine headache     Murmur, heart     Recurrent major depressive disorder, in full remission 09/11/2017    DOING WELL ON TRINTELLIX SAYS IT KEEPS HER SMILING :)     Rheumatic fever     Syphilis     Tendinitis of hip, left 10/05/2017    Trochanteric bursitis of left hip 10/05/2017         SURGICALHX  Past Surgical History:   Procedure Laterality Date    OTHER SURGICAL HISTORY Left 1995    BROKEN BONES , LEFT ARM.  1988 & 1995     OTHER SURGICAL HISTORY      METAL IMPLANTS     ULNAR COLLATERAL LIGAMENT REPAIR      ligaments released left arm    WRIST SURGERY  1995    metal plate left wrist        SOC  Social History     Socioeconomic History    Marital status:    Tobacco Use     Smoking status: Former     Current packs/day: 0.00     Average packs/day: 0.5 packs/day for 15.9 years (7.9 ttl pk-yrs)     Types: Cigarettes     Start date: 3/21/2006     Quit date: 2022     Years since quittin.7    Smokeless tobacco: Never    Tobacco comments:     Will be stopping 2..   Vaping Use    Vaping status: Every Day    Substances: Nicotine, Flavoring    Devices: Disposable, Pre-filled pod   Substance and Sexual Activity    Alcohol use: Not Currently     Comment: social    Drug use: Not Currently     Comment: FORMER IN THE PAST     Sexual activity: Yes     Partners: Male     Birth control/protection: None         FAMHX  Family History   Problem Relation Age of Onset    Prostate cancer Father     COPD Father         COPD    Diabetes type II Mother     Anxiety disorder Mother         anxiety    Depression Mother     Drug abuse Mother     Hyperlipidemia Mother     Miscarriages / Stillbirths Mother         miscarriage    Anxiety disorder Son         Not diagnosed but show signs/anxiety    Uterine cancer Maternal Aunt 30        to clarify if uterine or cervical    Stroke Other     Heart disease Other     Breast cancer Neg Hx     Colon cancer Neg Hx     Deep vein thrombosis Neg Hx     Pulmonary embolism Neg Hx           ALLERGY  No Known Allergies     MEDSCURRENT    Current Outpatient Medications:     B Complex Vitamins (VITAMIN B COMPLEX PO), Take  by mouth., Disp: , Rfl:     baclofen (LIORESAL) 20 MG tablet, Take 1 tablet by mouth 3 (Three) Times a Day., Disp: 90 tablet, Rfl: 0    busPIRone (BUSPAR) 15 MG tablet, TAKE 1 TABLET BY MOUTH THREE TIMES DAILY, Disp: 90 tablet, Rfl: 0    desvenlafaxine (PRISTIQ) 50 MG 24 hr tablet, Take 1 tablet by mouth every night at bedtime., Disp: , Rfl:     escitalopram (Lexapro) 5 MG tablet, Take 1 tablet by mouth Daily., Disp: 90 tablet, Rfl: 1    fluticasone (FLONASE) 50 MCG/ACT nasal spray, 2 sprays by Each Nare route Daily., Disp: 16 g, Rfl: 3    lisinopril  "(PRINIVIL,ZESTRIL) 20 MG tablet, Take 1 tablet by mouth Daily. New medication for patient., Disp: 30 tablet, Rfl: 0    loratadine (Claritin) 10 MG tablet, Take 1 tablet by mouth Daily for 30 days., Disp: 30 tablet, Rfl: 0    montelukast (SINGULAIR) 10 MG tablet, Take 1 tablet by mouth Daily., Disp: 90 tablet, Rfl: 1    multivitamin with minerals (Hair Skin and Nails Formula) tablet tablet, Take 1 tablet by mouth Daily., Disp: , Rfl:     multivitamin with minerals tablet tablet, Take 1 tablet by mouth Daily., Disp: , Rfl:     naproxen (NAPROSYN) 500 MG tablet, TAKE 1 TABLET BY MOUTH TWICE DAILY WITH MEALS, Disp: 90 tablet, Rfl: 0    topiramate (TOPAMAX) 50 MG tablet, Take 1 tablet by mouth twice daily, Disp: 180 tablet, Rfl: 0    olopatadine (PATANOL) 0.1 % ophthalmic solution, Administer 1 drop to both eyes 2 (Two) Times a Day As Needed for Allergies. (Patient not taking: Reported on 11/13/2024), Disp: 5 mL, Rfl: 0    potassium chloride (KLOR-CON M20) 20 MEQ CR tablet, Take 1 tablet by mouth Daily. (Patient not taking: Reported on 11/13/2024), Disp: 5 tablet, Rfl: 0    Rexulti 3 MG tablet, Take 1 tablet by mouth Daily. (Patient not taking: Reported on 11/13/2024), Disp: 90 tablet, Rfl: 1      Review of Systems   Constitutional: Positive for malaise/fatigue.   HENT: Negative.     Eyes: Negative.    Cardiovascular:  Positive for chest pain, dyspnea on exertion and palpitations.   Respiratory:  Positive for sleep disturbances due to breathing.    Endocrine: Negative.    Hematologic/Lymphatic: Negative.    Skin: Negative.    Musculoskeletal: Negative.    Gastrointestinal: Negative.    Genitourinary: Negative.    Neurological: Negative.    Psychiatric/Behavioral:  The patient is nervous/anxious.         Objective     BP (!) 169/107   Pulse 113   Ht 167.6 cm (65.98\")   Wt 98.9 kg (218 lb)   BMI 35.21 kg/m²       General Appearance:   well developed  well nourished  HENT:   oropharynx moist  lips not " cyanotic  Neck:  thyroid not enlarged  supple  Respiratory:  no respiratory distress  normal breath sounds  no rales  Cardiovascular:  no jugular venous distention  regular rhythm, mildly rapid rate  apical impulse normal  S1 normal, S2 normal  no S3, no S4   no murmur  no rub, no thrill  carotid pulses normal; no bruit  pedal pulses normal  lower extremity edema: none    Musculoskeletal:  no clubbing of fingers.   normocephalic, head atraumatic  Skin:   warm, dry  Psychiatric:  judgement and insight appropriate  normal mood and affect      Result Review :     The following data was reviewed by: Ian Barber MD on 11/13/2024:    CMP          10/27/2024    11:11 10/30/2024    21:50 10/31/2024    22:16   CMP   Glucose 114  129  105    BUN 5  3  6    Creatinine 0.72  0.67  0.66    EGFR 103.9  108.6  109.0    Sodium 137  138  141    Potassium 3.7  3.4  3.9    Chloride 103  103  105    Calcium 9.2  9.2  9.1    Total Protein 6.9  6.7  6.5    Albumin 3.9  4.0  3.8    Globulin 3.0  2.7  2.7    Total Bilirubin 0.4  0.5  0.3    Alkaline Phosphatase 90  86  83    AST (SGOT) 18  21  19    ALT (SGPT) 24  24  25    Albumin/Globulin Ratio 1.3  1.5  1.4    BUN/Creatinine Ratio 6.9  4.5  9.1    Anion Gap 10.1  12.3  12.6      CBC          10/27/2024    11:11 10/30/2024    21:50 10/31/2024    22:16   CBC   WBC 5.43  5.42  5.18    RBC 4.77  4.95  4.62    Hemoglobin 13.3  13.5  12.6    Hematocrit 40.5  42.0  39.1    MCV 84.9  84.8  84.6    MCH 27.9  27.3  27.3    MCHC 32.8  32.1  32.2    RDW 13.2  13.3  13.4    Platelets 303  308  312        TSH          5/5/2024    21:23   TSH   TSH 0.890        Data reviewed : Emergency room records reviewed, EKG reviewed last month which showed sinus rhythm, rate 100, normal EKG no change compared EKG reviewed from August 2024    Constantin Mata  reports that she quit smoking about 2 years ago. Her smoking use included cigarettes. She started smoking about 18 years ago.  She has a 7.9 pack-year smoking history. She has never used smokeless tobacco. I have educated her on the risk of diseases from using tobacco products such as cancer, COPD, and heart disease.     I advised her to quit and she is willing to quit. We have discussed the following method/s for tobacco cessation:  Counseling.  Together we have set a quit date for  when she weans down to 0 vaping .  She will follow up with me in  the future   as needed  or sooner to check on her progress.    I spent 3  minutes counseling the patient.                Assessment and Plan        ASSESSMENT:  Encounter Diagnoses   Name Primary?    Precordial pain Yes    Hypertension, essential     Anxiety          PLAN:    1.  Precordial pain-this seems to be in conjunction with periods of anxiety.  Her emergency room workups have been negative thus far.  Risk factors include previous tobacco use, previous drug use, hypertension.  A stress echo will be scheduled.  If this appears low risk I think she would benefit from a beta-blocker for not only her blood pressure but to prevent symptomatic tachycardia especially with anxiety.  We will run the stress test first and go from there  2.  Essential hypertension-elevated today, seems to have some degree of anxiety induced whitecoat hypertension.  Will likely add a beta-blocker later which should help  3.  Anxiety-seems significant based on patient's description, will defer to PCP for management    We will discuss the diagnostic results when available      Patient was given instructions and counseling regarding her condition or for health maintenance advice. Please see specific information pulled into the AVS if appropriate.             BISI Barber MD  11/13/2024    10:20 EST   Yes

## 2024-11-15 ENCOUNTER — PATIENT ROUNDING (BHMG ONLY) (OUTPATIENT)
Dept: CARDIOLOGY | Facility: CLINIC | Age: 49
End: 2024-11-15

## 2024-11-18 NOTE — PROGRESS NOTES
A [employer] message had been sent to the patient for PATIENT ROUNDING with [facility] CARD ET[facility]

## 2024-12-02 RX ORDER — BUSPIRONE HYDROCHLORIDE 15 MG/1
15 TABLET ORAL 3 TIMES DAILY
Qty: 90 TABLET | Refills: 0 | Status: SHIPPED | OUTPATIENT
Start: 2024-12-02

## 2025-05-07 ENCOUNTER — OFFICE VISIT (OUTPATIENT)
Dept: ORTHOPEDIC SURGERY | Facility: CLINIC | Age: 50
End: 2025-05-07

## 2025-05-07 VITALS
SYSTOLIC BLOOD PRESSURE: 154 MMHG | OXYGEN SATURATION: 92 % | HEIGHT: 66 IN | BODY MASS INDEX: 36.64 KG/M2 | DIASTOLIC BLOOD PRESSURE: 93 MMHG | HEART RATE: 120 BPM | WEIGHT: 228 LBS

## 2025-05-07 DIAGNOSIS — M75.51 BURSITIS OF RIGHT SHOULDER: ICD-10-CM

## 2025-05-07 DIAGNOSIS — M25.511 RIGHT SHOULDER PAIN, UNSPECIFIED CHRONICITY: Primary | ICD-10-CM

## 2025-05-07 RX ADMIN — LIDOCAINE HYDROCHLORIDE 5 ML: 10 INJECTION, SOLUTION INFILTRATION; PERINEURAL at 08:15

## 2025-05-07 RX ADMIN — TRIAMCINOLONE ACETONIDE 40 MG: 40 INJECTION, SUSPENSION INTRA-ARTICULAR; INTRAMUSCULAR at 08:15

## 2025-05-07 NOTE — PROGRESS NOTES
"Chief Complaint  Follow-up of the Right Shoulder     Subjective      Mallory Mata presents to [facility]THOPED[facility] for follow up of the right shoulder. She has had pain for awhile she has treated conservatively with injections. She has decrease ROM for forward and upward ROM. Her last injection was 12/13/23. She has pain in the neck, shoulder and the scapula.      No Known Allergies     Social History     Socioeconomic History    Marital status:    Tobacco Use    Smoking status: Former     Current packs/day: 0.00     Average packs/day: 0.5 packs/day for 15.9 years (7.9 ttl pk-yrs)     Types: Cigarettes     Start date: 3/21/2006     Quit date: 2/1/2022     Years since quitting: 3.2    Smokeless tobacco: Never    Tobacco comments:     Will be stopping 2.1.22   Vaping Use    Vaping status: Every Day    Substances: Nicotine, Flavoring    Devices: Disposable, Pre-filled pod    Passive vaping exposure: Yes   Substance and Sexual Activity    Alcohol use: Not Currently     Comment: social    Drug use: Not Currently     Comment: FORMER IN THE PAST     Sexual activity: Yes     Partners: Male     Birth control/protection: None        I reviewed the patient's chief complaint, history of present illness, review of systems, past medical history, surgical history, family history, social history, medications, and allergy list.     Review of Systems     Constitutional: Denies fevers, chills, weight loss  Cardiovascular: Denies chest pain, shortness of breath  Skin: Denies rashes, acute skin changes  Neurologic: Denies headache, loss of consciousness        Vital Signs:   /93   Pulse 120   Ht 167.6 cm (65.98\")   Wt 103 kg (228 lb)   SpO2 92%   BMI 36.82 kg/m²          Physical Exam  General: Alert. No acute distress    Ortho Exam        RIGHT SHOULDER Forward flexion 170. Abduction 100. External rotation 60. Internal rotation L5. Positive Cross body adduction. Supraspinatus strength 4/5. " Infraspinatus Strength 4/5. Infrared subscap 4/5. Positive Price. Positive Neer. Negative Apprehension. Negative Lift off. (Negative Obriens. Sensation intact to light touch, median, radial, ulnar nerve. Positive AIN, PIN, ulnar nerve motor. Positive pulses. Positive Impingement signs. Good strength in triceps, biceps, deltoid, wrist extensors and wrist flexors.         Large Joint Arthrocentesis: R subacromial bursa  Date/Time: 5/7/2025 8:15 AM  Consent given by: patient  Site marked: site marked  Timeout: Immediately prior to procedure a time out was called to verify the correct patient, procedure, equipment, support staff and site/side marked as required   Supporting Documentation  Indications: pain   Procedure Details  Location: shoulder - R subacromial bursa  Preparation: Patient was prepped and draped in the usual sterile fashion  Needle gauge: 21 G.  Medications administered: 5 mL lidocaine 1 %; 40 mg triamcinolone acetonide 40 MG/ML  Patient tolerance: patient tolerated the procedure well with no immediate complications      This injection documentation was Scribed for Sb Carrasquillo MD by GLADYS Truong.  05/07/25   08:16 EDT        Imaging Results (Most Recent)       None             Result Review :          Assessment and Plan     Diagnoses and all orders for this visit:    1. Right shoulder pain, unspecified chronicity (Primary)    2. Bursitis of right shoulder    Other orders  -     Large Joint Arthrocentesis: R subacromial bursa        Discussed the treatment plan with the patient.     Discussed the risks and benefits of conservative measures. The patient expressed understanding and wished to proceed with a right shoulder steroid injection.  She tolerated the injection well.      Discussed with the patient that due to the steroid injection given today in the office they may see an increase in blood sugar for a few days. Advised patient to monitor sugar after receiving the injection.      Discussed possibility of a reaction from the injection.  Discussed the possibility that the injection may not completely improve or remove the pain.  Discussed the risk of infection.        Educated on risk of smoking/nicotine. Discussed options for smoking cessation regarding chantix, nicorette gum and/ or to call the quit hotline at [phone]  and Call or return if worsening symptoms.    Follow Up     PRN      Patient was given instructions and counseling regarding her condition or for health maintenance advice. Please see specific information pulled into the AVS if appropriate.     Scribed for Sb Carrasquillo MD by Sandra Simeon MA.  05/07/25   08:04 EDT    I have personally performed the services described in this document as scribed by the above individual and it is both accurate and complete. Sb Carrasquillo MD 05/08/25

## 2025-05-08 RX ORDER — LIDOCAINE HYDROCHLORIDE 10 MG/ML
5 INJECTION, SOLUTION INFILTRATION; PERINEURAL
Status: COMPLETED | OUTPATIENT
Start: 2025-05-07 | End: 2025-05-07

## 2025-05-08 RX ORDER — TRIAMCINOLONE ACETONIDE 40 MG/ML
40 INJECTION, SUSPENSION INTRA-ARTICULAR; INTRAMUSCULAR
Status: COMPLETED | OUTPATIENT
Start: 2025-05-07 | End: 2025-05-07

## 2025-06-25 NOTE — TELEPHONE ENCOUNTER
Problem: Fatigue  Goal: Improved Activity Tolerance  Outcome: Progressing  Intervention: Promote Improved Energy  Flowsheets (Taken 6/25/2025 1210)  Activity Management: Ambulated -L4  Environmental Support: rest periods encouraged      PA approved for Varenicline Tartrate 1MG tablets  Approvedtoday  PA Case: [id number], Status: Approved, Coverage Starts on: 1/1/2023 12:00:00 AM, Coverage Ends on: 6/25/2023 12:00:00 AM.    [email or web address] messge sent to pt.

## 2025-07-14 NOTE — TELEPHONE ENCOUNTER
Caller: Mallory Mata    Relationship: Self    Best call back number: [phone]     Requested Prescriptions:   Requested Prescriptions     Pending Prescriptions Disp Refills    baclofen (LIORESAL) 20 MG tablet 90 tablet 0     Sig: Take 1 tablet by mouth 3 (Three) Times a Day.    busPIRone (BUSPAR) 15 MG tablet 90 tablet 0     Sig: Take 1 tablet by mouth 3 (Three) Times a Day.    naproxen (NAPROSYN) 500 MG tablet 90 tablet 0     Sig: Take 1 tablet by mouth 2 (Two) Times a Day With Meals.    montelukast (SINGULAIR) 10 MG tablet 90 tablet 1     Sig: Take 1 tablet by mouth Daily.        Pharmacy where request should be sent: [address] - [phone]  - [phone]     Last office visit with prescribing clinician: 3/13/2024   Last telemedicine visit with prescribing clinician: Visit date not found   Next office visit with prescribing clinician: Visit date not found     Additional details provided by patient: LESS THAN THREE DAY SUPPLY ON HAND.     Does the patient have less than a 3 day supply:  [x] Yes  [] No      Annie Childs Rep   07/14/25 11:18 EDT

## 2025-07-15 RX ORDER — BUSPIRONE HYDROCHLORIDE 15 MG/1
15 TABLET ORAL 3 TIMES DAILY
Qty: 90 TABLET | Refills: 0 | Status: SHIPPED | OUTPATIENT
Start: 2025-07-15

## 2025-07-15 RX ORDER — BUSPIRONE HYDROCHLORIDE 15 MG/1
15 TABLET ORAL 3 TIMES DAILY
Qty: 90 TABLET | Refills: 0 | OUTPATIENT
Start: 2025-07-15

## 2025-07-15 RX ORDER — TOPIRAMATE 50 MG/1
50 TABLET, FILM COATED ORAL 2 TIMES DAILY
Qty: 180 TABLET | Refills: 0 | Status: SHIPPED | OUTPATIENT
Start: 2025-07-15

## 2025-07-15 RX ORDER — NAPROXEN 500 MG/1
500 TABLET ORAL 2 TIMES DAILY WITH MEALS
Qty: 90 TABLET | Refills: 0 | OUTPATIENT
Start: 2025-07-15

## 2025-07-15 RX ORDER — NAPROXEN 500 MG/1
500 TABLET ORAL 2 TIMES DAILY WITH MEALS
Qty: 90 TABLET | Refills: 0 | Status: SHIPPED | OUTPATIENT
Start: 2025-07-15

## 2025-07-15 RX ORDER — BACLOFEN 20 MG/1
20 TABLET ORAL 3 TIMES DAILY
Qty: 90 TABLET | Refills: 0 | OUTPATIENT
Start: 2025-07-15

## 2025-07-15 RX ORDER — BACLOFEN 20 MG/1
20 TABLET ORAL 3 TIMES DAILY
Qty: 90 TABLET | Refills: 0 | Status: SHIPPED | OUTPATIENT
Start: 2025-07-15

## 2025-07-15 RX ORDER — MONTELUKAST SODIUM 10 MG/1
10 TABLET ORAL DAILY
Qty: 90 TABLET | Refills: 1 | Status: SHIPPED | OUTPATIENT
Start: 2025-07-15

## 2025-07-15 NOTE — TELEPHONE ENCOUNTER
Anticonvulsants Protocol [id number]/15/2025 09:29 AM   Protocol Details Visit with authorizing provider in past 12 months or upcoming 30 days

## 2025-07-25 PROCEDURE — 87086 URINE CULTURE/COLONY COUNT: CPT | Performed by: EMERGENCY MEDICINE

## 2025-07-29 ENCOUNTER — OFFICE VISIT (OUTPATIENT)
Dept: INTERNAL MEDICINE | Facility: CLINIC | Age: 50
End: 2025-07-29
Payer: MEDICAID

## 2025-07-29 VITALS
HEART RATE: 88 BPM | BODY MASS INDEX: 35.02 KG/M2 | DIASTOLIC BLOOD PRESSURE: 62 MMHG | SYSTOLIC BLOOD PRESSURE: 106 MMHG | WEIGHT: 217 LBS | TEMPERATURE: 97.8 F | OXYGEN SATURATION: 100 % | RESPIRATION RATE: 20 BRPM

## 2025-07-29 DIAGNOSIS — R71.8 OTHER ABNORMALITY OF RED BLOOD CELLS: ICD-10-CM

## 2025-07-29 DIAGNOSIS — R53.83 FATIGUE, UNSPECIFIED TYPE: Primary | ICD-10-CM

## 2025-07-29 DIAGNOSIS — F33.42 MAJOR DEPRESSIVE DISORDER, RECURRENT EPISODE, IN FULL REMISSION: ICD-10-CM

## 2025-07-29 DIAGNOSIS — E55.9 VITAMIN D DEFICIENCY, UNSPECIFIED: ICD-10-CM

## 2025-07-29 DIAGNOSIS — R06.83 SNORING: ICD-10-CM

## 2025-07-29 PROCEDURE — 99214 OFFICE O/P EST MOD 30 MIN: CPT | Performed by: NURSE PRACTITIONER

## 2025-07-29 RX ORDER — DESVENLAFAXINE 50 MG/1
50 TABLET, FILM COATED, EXTENDED RELEASE ORAL
Qty: 90 TABLET | Refills: 0 | Status: SHIPPED | OUTPATIENT
Start: 2025-07-29

## 2025-07-29 NOTE — PROGRESS NOTES
"Chief Complaint  Fatigue (-symptoms started last weekend.)    Subjective        Mallory Mata presents to [facility]  Fatigue  Symptoms: fatigue      Presents today due to new onset of fatigue which started last week. Last week while working she laid down during a break and fell asleep without realizing it until she woke up hours later. She had went to urgent care who told her to follow up with PCP for labs. She also has had trouble falling asleep at night and difficulty waking up in the mornings and not feeling well rested throughout day. She has had some recent stressors in her life as her and her  are  and her mother has been sick recently. She has history of depression but her typical depressive symptoms are not being able to sleep rather than feeling fatigued throughout the day. She reports being told that she snores occasionally at night and has found herself snoring as well.     She has not been taking the Pristiq as prescribed due to not being able to afford going back and seeing a psychiatrist for refills. She has been taking the medication 3-4 times per week to try and make the medication last longer. She did report having better control of her depressive symptoms while on pristiq daily. Currently only taking buspirone for mental health.     Objective   Vital Signs:  /62   Pulse 88   Temp 97.8 °F (36.6 °C)   Resp 20   Wt 98.4 kg (217 lb)   SpO2 100%   BMI 35.02 kg/m²   Estimated body mass index is 35.02 kg/m² as calculated from the following:    Height as of 7/25/25: 167.6 cm (66\").    Weight as of this encounter: 98.4 kg (217 lb).            Physical Exam  Vitals reviewed.   Constitutional:       General: She is not in acute distress.     Appearance: Normal appearance.   Cardiovascular:      Rate and Rhythm: Normal rate and regular rhythm.      Heart sounds: Normal heart sounds.   Pulmonary:      Effort: Pulmonary effort is " normal.      Breath sounds: Normal breath sounds.   Neurological:      Mental Status: She is alert.   Psychiatric:         Mood and Affect: Mood normal.         Behavior: Behavior normal.         Thought Content: Thought content normal.         Judgment: Judgment normal.        Result Review :                Assessment and Plan   Diagnoses and all orders for this visit:    1. Fatigue, unspecified type (Primary)  -     TSH; Future  -     CBC w AUTO Differential; Future  -     Comprehensive metabolic panel; Future  -     Vitamin D 25 hydroxy; Future  -     Vitamin B12; Future  -     Folate; Future  -     Iron and TIBC; Future  -     Ambulatory Referral to Sleep Medicine    2. Major depressive disorder, recurrent episode, in full remission    3. Vitamin D deficiency, unspecified   -     Vitamin D 25 hydroxy; Future    4. Snoring  -     Ambulatory Referral to Sleep Medicine    5. Other abnormality of red blood cells  -     Iron and TIBC; Future    Other orders  -     desvenlafaxine (PRISTIQ) 50 MG 24 hr tablet; Take 1 tablet by mouth every night at bedtime.  Dispense: 90 tablet; Refill: 0    Discussed options of going back on the pristiq for 3 weeks and reevaluating how she feels at that time before completing lab work due to cost concerns as she does not currently have health insurance. Agrees to this plan and if noimprovement of symptoms in 3 weeks will complete lab work at that time.     Reviewed Urgent Care note and testing including blood glucose level and UA. UA was abnormal for blood in urine, however she says she was on her menstrual cycle at the time. Her blood glucose was also elevated however she was not fasting.          Follow Up   No follow-ups on file.  Patient was given instructions and counseling regarding her condition or for health maintenance advice. Please see specific information pulled into the AVS if appropriate.

## 2025-08-11 ENCOUNTER — HOSPITAL ENCOUNTER (EMERGENCY)
Facility: HOSPITAL | Age: 50
Discharge: HOME | End: 2025-08-12
Attending: EMERGENCY MEDICINE
Payer: MEDICAID

## 2025-08-11 ENCOUNTER — APPOINTMENT (OUTPATIENT)
Dept: GENERAL RADIOLOGY | Facility: HOSPITAL | Age: 50
End: 2025-08-11
Payer: MEDICAID

## 2025-08-11 DIAGNOSIS — F19.10 SUBSTANCE ABUSE: Primary | ICD-10-CM

## 2025-08-11 DIAGNOSIS — R07.9 CHEST PAIN, UNSPECIFIED TYPE: ICD-10-CM

## 2025-08-11 LAB
AMPHET+METHAMPHET UR QL: NEGATIVE
AMPHETAMINES UR QL: NEGATIVE
BARBITURATES UR QL SCN: NEGATIVE
BASOPHILS # BLD AUTO: 0.07 10*3/MM3 (ref 0–0.2)
BASOPHILS NFR BLD AUTO: 0.5 % (ref 0–1.5)
BENZODIAZ UR QL SCN: NEGATIVE
BUPRENORPHINE SERPL-MCNC: NEGATIVE NG/ML
CANNABINOIDS SERPL QL: POSITIVE
COCAINE UR QL: POSITIVE
DEPRECATED RDW RBC AUTO: 42.5 FL (ref 37–54)
EOSINOPHIL # BLD AUTO: 0.08 10*3/MM3 (ref 0–0.4)
EOSINOPHIL NFR BLD AUTO: 0.6 % (ref 0.3–6.2)
ERYTHROCYTE [DISTWIDTH] IN BLOOD BY AUTOMATED COUNT: 13.8 % (ref 12.3–15.4)
FENTANYL UR-MCNC: NEGATIVE NG/ML
HCG INTACT+B SERPL-ACNC: <0.5 MIU/ML
HCT VFR BLD AUTO: 42.3 % (ref 34–46.6)
HGB BLD-MCNC: 14 G/DL (ref 12–15.9)
HOLD SPECIMEN: NORMAL
HOLD SPECIMEN: NORMAL
IMM GRANULOCYTES # BLD AUTO: 0.04 10*3/MM3 (ref 0–0.05)
IMM GRANULOCYTES NFR BLD AUTO: 0.3 % (ref 0–0.5)
LYMPHOCYTES # BLD AUTO: 2.72 10*3/MM3 (ref 0.7–3.1)
LYMPHOCYTES NFR BLD AUTO: 20.9 % (ref 19.6–45.3)
MCH RBC QN AUTO: 28.1 PG (ref 26.6–33)
MCHC RBC AUTO-ENTMCNC: 33.1 G/DL (ref 31.5–35.7)
MCV RBC AUTO: 84.8 FL (ref 79–97)
METHADONE UR QL SCN: NEGATIVE
MONOCYTES # BLD AUTO: 0.88 10*3/MM3 (ref 0.1–0.9)
MONOCYTES NFR BLD AUTO: 6.8 % (ref 5–12)
NEUTROPHILS NFR BLD AUTO: 70.9 % (ref 42.7–76)
NEUTROPHILS NFR BLD AUTO: 9.23 10*3/MM3 (ref 1.7–7)
NRBC BLD AUTO-RTO: 0 /100 WBC (ref 0–0.2)
OPIATES UR QL: NEGATIVE
OXYCODONE UR QL SCN: NEGATIVE
PCP UR QL SCN: NEGATIVE
PLATELET # BLD AUTO: 329 10*3/MM3 (ref 140–450)
PMV BLD AUTO: 10.5 FL (ref 6–12)
RBC # BLD AUTO: 4.99 10*6/MM3 (ref 3.77–5.28)
TRICYCLICS UR QL SCN: NEGATIVE
WBC NRBC COR # BLD AUTO: 13.02 10*3/MM3 (ref 3.4–10.8)
WHOLE BLOOD HOLD COAG: NORMAL
WHOLE BLOOD HOLD SPECIMEN: NORMAL

## 2025-08-11 PROCEDURE — 83880 ASSAY OF NATRIURETIC PEPTIDE: CPT

## 2025-08-11 PROCEDURE — 80053 COMPREHEN METABOLIC PANEL: CPT | Performed by: EMERGENCY MEDICINE

## 2025-08-11 PROCEDURE — 80179 DRUG ASSAY SALICYLATE: CPT | Performed by: EMERGENCY MEDICINE

## 2025-08-11 PROCEDURE — 71045 X-RAY EXAM CHEST 1 VIEW: CPT

## 2025-08-11 PROCEDURE — 84443 ASSAY THYROID STIM HORMONE: CPT | Performed by: EMERGENCY MEDICINE

## 2025-08-11 PROCEDURE — 99284 EMERGENCY DEPT VISIT MOD MDM: CPT

## 2025-08-11 PROCEDURE — 83735 ASSAY OF MAGNESIUM: CPT | Performed by: EMERGENCY MEDICINE

## 2025-08-11 PROCEDURE — 84702 CHORIONIC GONADOTROPIN TEST: CPT | Performed by: EMERGENCY MEDICINE

## 2025-08-11 PROCEDURE — 84484 ASSAY OF TROPONIN QUANT: CPT

## 2025-08-11 PROCEDURE — 80307 DRUG TEST PRSMV CHEM ANLYZR: CPT | Performed by: EMERGENCY MEDICINE

## 2025-08-11 PROCEDURE — 82077 ASSAY SPEC XCP UR&BREATH IA: CPT | Performed by: EMERGENCY MEDICINE

## 2025-08-11 PROCEDURE — 36415 COLL VENOUS BLD VENIPUNCTURE: CPT

## 2025-08-11 PROCEDURE — 93005 ELECTROCARDIOGRAM TRACING: CPT | Performed by: EMERGENCY MEDICINE

## 2025-08-11 PROCEDURE — 93005 ELECTROCARDIOGRAM TRACING: CPT

## 2025-08-11 PROCEDURE — 80143 DRUG ASSAY ACETAMINOPHEN: CPT | Performed by: EMERGENCY MEDICINE

## 2025-08-11 PROCEDURE — 85025 COMPLETE CBC W/AUTO DIFF WBC: CPT

## 2025-08-11 RX ORDER — SODIUM CHLORIDE 0.9 % (FLUSH) 0.9 %
10 SYRINGE (ML) INJECTION AS NEEDED
Status: DISCONTINUED | OUTPATIENT
Start: 2025-08-11 | End: 2025-08-12 | Stop reason: HOSPADM

## 2025-08-12 VITALS
HEIGHT: 66 IN | DIASTOLIC BLOOD PRESSURE: 87 MMHG | OXYGEN SATURATION: 98 % | TEMPERATURE: 98.4 F | SYSTOLIC BLOOD PRESSURE: 145 MMHG | BODY MASS INDEX: 34.26 KG/M2 | WEIGHT: 213.19 LBS | RESPIRATION RATE: 18 BRPM | HEART RATE: 105 BPM

## 2025-08-12 LAB
ALBUMIN SERPL-MCNC: 4.5 G/DL (ref 3.5–5.2)
ALBUMIN/GLOB SERPL: 1.5 G/DL
ALP SERPL-CCNC: 106 U/L (ref 39–117)
ALT SERPL W P-5'-P-CCNC: 20 U/L (ref 1–33)
ANION GAP SERPL CALCULATED.3IONS-SCNC: 12.3 MMOL/L (ref 5–15)
APAP SERPL-MCNC: <5 MCG/ML (ref 0–30)
AST SERPL-CCNC: 15 U/L (ref 1–32)
BILIRUB SERPL-MCNC: 0.3 MG/DL (ref 0–1.2)
BUN SERPL-MCNC: 8 MG/DL (ref 6–20)
BUN/CREAT SERPL: 11.9 (ref 7–25)
CALCIUM SPEC-SCNC: 9.6 MG/DL (ref 8.6–10.5)
CHLORIDE SERPL-SCNC: 100 MMOL/L (ref 98–107)
CO2 SERPL-SCNC: 23.7 MMOL/L (ref 22–29)
CREAT SERPL-MCNC: 0.67 MG/DL (ref 0.57–1)
EGFRCR SERPLBLD CKD-EPI 2021: 108 ML/MIN/1.73
ETHANOL BLD-MCNC: <10 MG/DL (ref 0–10)
ETHANOL UR QL: <0.01 %
GEN 5 1HR TROPONIN T REFLEX: <6 NG/L
GLOBULIN UR ELPH-MCNC: 3 GM/DL
GLUCOSE SERPL-MCNC: 118 MG/DL (ref 65–99)
MAGNESIUM SERPL-MCNC: 1.9 MG/DL (ref 1.6–2.6)
NT-PROBNP SERPL-MCNC: <36 PG/ML (ref 0–450)
POTASSIUM SERPL-SCNC: 3.9 MMOL/L (ref 3.5–5.2)
PROT SERPL-MCNC: 7.5 G/DL (ref 6–8.5)
SALICYLATES SERPL-MCNC: <0.3 MG/DL
SODIUM SERPL-SCNC: 136 MMOL/L (ref 136–145)
TROPONIN T NUMERIC DELTA: NORMAL
TROPONIN T SERPL HS-MCNC: <6 NG/L
TSH SERPL DL<=0.05 MIU/L-ACNC: 1.19 UIU/ML (ref 0.27–4.2)

## 2025-08-12 PROCEDURE — 84484 ASSAY OF TROPONIN QUANT: CPT

## 2025-08-16 LAB
QT INTERVAL: 325 MS
QTC INTERVAL: 452 MS